# Patient Record
Sex: FEMALE | Race: WHITE | NOT HISPANIC OR LATINO | Employment: UNEMPLOYED | URBAN - METROPOLITAN AREA
[De-identification: names, ages, dates, MRNs, and addresses within clinical notes are randomized per-mention and may not be internally consistent; named-entity substitution may affect disease eponyms.]

---

## 2017-03-22 ENCOUNTER — TELEPHONE (OUTPATIENT)
Dept: OBSTETRICS AND GYNECOLOGY | Facility: CLINIC | Age: 27
End: 2017-03-22

## 2017-03-22 ENCOUNTER — LAB VISIT (OUTPATIENT)
Dept: LAB | Facility: HOSPITAL | Age: 27
End: 2017-03-22
Attending: OBSTETRICS & GYNECOLOGY
Payer: COMMERCIAL

## 2017-03-22 DIAGNOSIS — Z32.00 POSSIBLE PREGNANCY: ICD-10-CM

## 2017-03-22 DIAGNOSIS — Z32.00 POSSIBLE PREGNANCY: Primary | ICD-10-CM

## 2017-03-22 LAB — HCG INTACT+B SERPL-ACNC: <1.2 MIU/ML

## 2017-03-22 PROCEDURE — 84702 CHORIONIC GONADOTROPIN TEST: CPT

## 2017-03-22 NOTE — TELEPHONE ENCOUNTER
Sarah pt - pt said she is not feeling well so she wanted to see if she could discuss some things with the nurse.

## 2017-03-22 NOTE — TELEPHONE ENCOUNTER
Sarah pt stating that she is having surgery on her arm next week. Pt stating that she has a strong feeling that she is pregnant because she is feeling different and off. States that she took a upt that came back negative but she thinks it is still to early to be picked up on a home test. She would like to see if she can come in and get blood work done.    Dr. Smith do you want me to put in orders and schedule her for labs?

## 2017-03-22 NOTE — TELEPHONE ENCOUNTER
----- Message from Pili Smith MD sent at 3/22/2017  4:28 PM CDT -----  Call pt./ labs came back and she is not pregnant.

## 2017-03-22 NOTE — TELEPHONE ENCOUNTER
Dr Smith pt calling, came in today to do lab work and wants to know if they are back. Please call pt to let her know.Pt # [phone]

## 2017-04-04 RX ORDER — DROSPIRENONE AND ETHINYL ESTRADIOL 0.03MG-3MG
1 KIT ORAL DAILY
Qty: 84 TABLET | Refills: 0 | Status: SHIPPED | OUTPATIENT
Start: 2017-04-04 | End: 2017-06-26 | Stop reason: SDUPTHER

## 2017-04-04 NOTE — TELEPHONE ENCOUNTER
Pt said pharm told her they were waiting for the dr to authorize rx refill. Pt said she is out of her birth control and needs to start new pack today so she would like to know if she can get this called in asap.

## 2017-06-26 DIAGNOSIS — Z30.9 ENCOUNTER FOR CONTRACEPTIVE MANAGEMENT, UNSPECIFIED TYPE: Primary | ICD-10-CM

## 2017-06-26 RX ORDER — DROSPIRENONE AND ETHINYL ESTRADIOL 0.03MG-3MG
1 KIT ORAL DAILY
Qty: 84 TABLET | Refills: 0 | OUTPATIENT
Start: 2017-06-26

## 2017-06-26 RX ORDER — DROSPIRENONE AND ETHINYL ESTRADIOL 0.03MG-3MG
1 KIT ORAL DAILY
Qty: 84 TABLET | Refills: 0 | Status: SHIPPED | OUTPATIENT
Start: 2017-06-26 | End: 2017-09-15 | Stop reason: SDUPTHER

## 2017-08-22 ENCOUNTER — OFFICE VISIT (OUTPATIENT)
Dept: OBSTETRICS AND GYNECOLOGY | Facility: CLINIC | Age: 27
End: 2017-08-22
Payer: COMMERCIAL

## 2017-08-22 ENCOUNTER — TELEPHONE (OUTPATIENT)
Dept: OBSTETRICS AND GYNECOLOGY | Facility: CLINIC | Age: 27
End: 2017-08-22

## 2017-08-22 VITALS
BODY MASS INDEX: 20.74 KG/M2 | WEIGHT: 117.06 LBS | HEIGHT: 63 IN | SYSTOLIC BLOOD PRESSURE: 116 MMHG | DIASTOLIC BLOOD PRESSURE: 68 MMHG

## 2017-08-22 DIAGNOSIS — B37.31 VAGINAL YEAST INFECTION: Primary | ICD-10-CM

## 2017-08-22 DIAGNOSIS — N89.8 VAGINA ITCHING: ICD-10-CM

## 2017-08-22 PROCEDURE — 3008F BODY MASS INDEX DOCD: CPT | Mod: S$GLB,,, | Performed by: NURSE PRACTITIONER

## 2017-08-22 PROCEDURE — 87480 CANDIDA DNA DIR PROBE: CPT

## 2017-08-22 PROCEDURE — 99214 OFFICE O/P EST MOD 30 MIN: CPT | Mod: S$GLB,,, | Performed by: NURSE PRACTITIONER

## 2017-08-22 PROCEDURE — 87660 TRICHOMONAS VAGIN DIR PROBE: CPT

## 2017-08-22 PROCEDURE — 99999 PR PBB SHADOW E&M-EST. PATIENT-LVL III: CPT | Mod: PBBFAC,,, | Performed by: NURSE PRACTITIONER

## 2017-08-22 RX ORDER — FLUCONAZOLE 150 MG/1
TABLET ORAL
Qty: 2 TABLET | Refills: 0 | Status: SHIPPED | OUTPATIENT
Start: 2017-08-22 | End: 2017-12-27

## 2017-08-22 NOTE — PROGRESS NOTES
"Chief complaint: Vaginal Irritation/Itching    HPI: Vaginal Irritation/itching for the last 2-3 days. She started with itching and irritation and then used a monistat in the evening and had some relief but still with symptoms not getting better. The only new product is a bath bomb on Saturday. Otherwise she reports no changes in sexual partners, soaps, detergents, douching. No recent use of antibiotics. She has not used anything OTC and denies pelvic pain, fever, chills. She also reports this past cycle she had a one day spotting episode but this could be because she may have taken a pill late. No other reported symptoms.     ROS   Systemic: Not feeling tired (fatigue).  No fever chills   Gastrointestinal: No nausea, vomiting, no abdominal pain.  No diarrhea.  Genitourinary: No dysuria. No Pelvic Pain  Skin: No rash.    /68   Ht 5' 3" (1.6 m)   Wt 53.1 kg (117 lb 1 oz)   LMP 08/13/2017   BMI 20.74 kg/m²     Physical Exam   Vital Signs:° Normal.  General Appearance:° Well developed. ° Well nourished.  Lymph Nodes: no Inguinal LAD  Urinary System:° Normal. Urethra: ° Meatus showed no abnormalities. ° No mass on the urethra.  Genitalia: External: ° Vulva was normal. ° Genitalia exhibited no lesion.                    Vagina: ° Mucosa was normal. ° A vaginal discharge was observed moderate amt of thick clumpy yellow discharge (appears to be possible residual monistat/medications)  Pelvic: Cervix: ° No cervical discharge. ° Showed no lesion. ° Not tender, no CMT         Uterus: ° Position was normal. ° Not tender.  Neurological:° No disorientation was observed.  Psychiatric:Affect: ° Normal.  Skin:° No skin lesions.   Perineum:° Normal. ° Did not have a mass.° No perineal lesions/rashes/erythema     Assessment/Plan:  1. Yeast infection- affirm ordered since residual monistat in vault. Will treat empirically with Diflucan 150mg po today and repeat in 2 days, external Monistat cream as needed for symptom relief. " Prevention measures handout provided. And will notify of results and changes in medication if needed. Most likely related to bath-bomb     2. Spotting-- one time and possibly took pill late. Monitor and if continues notify office but did let pt know that irregular spotting can happen with missing pills or being very late taking pills.    RTC prn and as scheduled for annual exam.

## 2017-08-22 NOTE — TELEPHONE ENCOUNTER
Pt thinks she has a yeast infection.  C/o itching and swelling.  Has tried OTC medication without relief.  Scheduled with Ladan today.

## 2017-08-23 LAB
CANDIDA RRNA VAG QL PROBE: POSITIVE
G VAGINALIS RRNA GENITAL QL PROBE: NEGATIVE
T VAGINALIS RRNA GENITAL QL PROBE: NEGATIVE

## 2017-09-15 DIAGNOSIS — Z30.9 ENCOUNTER FOR CONTRACEPTIVE MANAGEMENT, UNSPECIFIED TYPE: ICD-10-CM

## 2017-09-15 RX ORDER — DROSPIRENONE AND ETHINYL ESTRADIOL 0.03MG-3MG
1 KIT ORAL DAILY
Qty: 84 TABLET | Refills: 0 | Status: SHIPPED | OUTPATIENT
Start: 2017-09-15 | End: 2017-11-09 | Stop reason: SDUPTHER

## 2017-11-09 ENCOUNTER — OFFICE VISIT (OUTPATIENT)
Dept: OBSTETRICS AND GYNECOLOGY | Facility: CLINIC | Age: 27
End: 2017-11-09
Attending: OBSTETRICS & GYNECOLOGY
Payer: COMMERCIAL

## 2017-11-09 VITALS
BODY MASS INDEX: 19.67 KG/M2 | HEIGHT: 63 IN | DIASTOLIC BLOOD PRESSURE: 64 MMHG | SYSTOLIC BLOOD PRESSURE: 102 MMHG | WEIGHT: 111 LBS

## 2017-11-09 DIAGNOSIS — Z30.9 ENCOUNTER FOR CONTRACEPTIVE MANAGEMENT, UNSPECIFIED TYPE: ICD-10-CM

## 2017-11-09 DIAGNOSIS — Z01.419 ENCOUNTER FOR GYNECOLOGICAL EXAMINATION (GENERAL) (ROUTINE) WITHOUT ABNORMAL FINDINGS: ICD-10-CM

## 2017-11-09 DIAGNOSIS — Z12.4 ENCOUNTER FOR PAPANICOLAOU SMEAR FOR CERVICAL CANCER SCREENING: Primary | ICD-10-CM

## 2017-11-09 PROCEDURE — 88175 CYTOPATH C/V AUTO FLUID REDO: CPT | Performed by: PATHOLOGY

## 2017-11-09 PROCEDURE — 99999 PR PBB SHADOW E&M-EST. PATIENT-LVL III: CPT | Mod: PBBFAC,,, | Performed by: OBSTETRICS & GYNECOLOGY

## 2017-11-09 PROCEDURE — 88141 CYTOPATH C/V INTERPRET: CPT | Mod: ,,, | Performed by: PATHOLOGY

## 2017-11-09 PROCEDURE — 99395 PREV VISIT EST AGE 18-39: CPT | Mod: S$GLB,,, | Performed by: OBSTETRICS & GYNECOLOGY

## 2017-11-09 PROCEDURE — 87624 HPV HI-RISK TYP POOLED RSLT: CPT

## 2017-11-09 RX ORDER — DROSPIRENONE AND ETHINYL ESTRADIOL 0.03MG-3MG
1 KIT ORAL DAILY
Qty: 84 TABLET | Refills: 3 | Status: SHIPPED | OUTPATIENT
Start: 2017-11-09 | End: 2018-11-03 | Stop reason: SDUPTHER

## 2017-11-09 NOTE — PROGRESS NOTES
Subjective:       Patient ID: Melinda Parekh is a 27 y.o. female.    Chief Complaint:  Well Woman (pt last pap 2016 normal)      There is no problem list on file for this patient.      History of Present Illness  27 y.o. yo  here for annual exam. No gyn complaints. Doing well.       Past Medical History:   Diagnosis Date    H/O elbow surgery     Oral contraceptive use        Past Surgical History:   Procedure Laterality Date    left elbow Left 2017    torn ligament       OB History    Para Term  AB Living   1 1 1     1   SAB TAB Ectopic Multiple Live Births           1      # Outcome Date GA Lbr Duncan/2nd Weight Sex Delivery Anes PTL Lv   1 Term 03/04/15   3.118 kg (6 lb 14 oz) F Vag-Spont EPI  EDMAR          Patient's last menstrual period was 10/09/2017 (approximate).   Date of Last Pap: 2016    Review of Systems  Review of Systems   Constitutional: Negative for fatigue and unexpected weight change.   Respiratory: Negative for shortness of breath.    Cardiovascular: Negative for chest pain.   Gastrointestinal: Negative for abdominal pain, constipation, diarrhea, nausea and vomiting.   Genitourinary: Negative for dysuria.   Musculoskeletal: Negative for back pain.   Skin: Negative for rash.   Neurological: Negative for headaches.   Hematological: Does not bruise/bleed easily.   Psychiatric/Behavioral: Negative for behavioral problems.        Objective:   Physical Exam:   Constitutional: She is oriented to person, place, and time. Vital signs are normal. She appears well-developed and well-nourished. No distress.        Pulmonary/Chest: She exhibits no mass. Right breast exhibits no mass, no nipple discharge, no skin change, no tenderness, no bleeding and no swelling. Left breast exhibits no mass, no nipple discharge, no skin change, no tenderness, no bleeding and no swelling. Breasts are symmetrical.        Abdominal: Soft. Normal appearance and bowel sounds are normal. She  exhibits no distension and no mass. There is no tenderness. There is no rebound.     Genitourinary: Vagina normal and uterus normal. There is no rash, tenderness, lesion or injury on the right labia. There is no rash, tenderness, lesion or injury on the left labia. Uterus is not deviated, not enlarged, not fixed, not tender, not hosting fibroids and not experiencing uterine prolapse. Cervix is normal. Right adnexum displays no mass, no tenderness and no fullness. Left adnexum displays no mass, no tenderness and no fullness. No erythema, tenderness, rectocele, cystocele or unspecified prolapse of vaginal walls in the vagina. No vaginal discharge found. Cervix exhibits no motion tenderness, no discharge and no friability.           Musculoskeletal: Normal range of motion and moves all extremeties.      Lymphadenopathy:     She has no axillary adenopathy.        Right: No supraclavicular adenopathy present.        Left: No supraclavicular adenopathy present.    Neurological: She is alert and oriented to person, place, and time.    Skin: Skin is warm and dry.    Psychiatric: She has a normal mood and affect. Her behavior is normal. Judgment normal.        Assessment/ Plan:     1. Encounter for Papanicolaou smear for cervical cancer screening  Liquid-based pap smear, screening   2. Encounter for contraceptive management, unspecified type  drospirenone-ethinyl estradiol (JESSICA) 3-0.03 mg per tablet   3. Encounter for gynecological examination (general) (routine) without abnormal findings         Follow-up with me in 1 year

## 2017-11-17 ENCOUNTER — NURSE TRIAGE (OUTPATIENT)
Dept: OBSTETRICS AND GYNECOLOGY | Facility: CLINIC | Age: 27
End: 2017-11-17

## 2017-11-17 DIAGNOSIS — Z11.51 ENCOUNTER FOR SCREENING FOR HUMAN PAPILLOMAVIRUS (HPV): Primary | ICD-10-CM

## 2017-11-17 NOTE — PROGRESS NOTES
Notes Recorded by Pili Smith MD on 11/16/2017 at 1:24 PM CST  Can you see if the lab can add HPV to this please?      Cytology lab still has specimen, order placed.

## 2017-11-28 ENCOUNTER — TELEPHONE (OUTPATIENT)
Dept: OBSTETRICS AND GYNECOLOGY | Facility: CLINIC | Age: 27
End: 2017-11-28

## 2017-11-28 ENCOUNTER — PATIENT MESSAGE (OUTPATIENT)
Dept: OBSTETRICS AND GYNECOLOGY | Facility: CLINIC | Age: 27
End: 2017-11-28

## 2017-11-28 NOTE — TELEPHONE ENCOUNTER
Dr. Smith-- pt would like to speak to Marcelo regarding her results and an appt. Pt's # [phone]

## 2017-11-28 NOTE — TELEPHONE ENCOUNTER
"Patient states Dr. Smith called her last night and told her that she needed a colpo done because of "some abnormal cells." The patient is panicking and wants this done asap.. Try to reassure patient, but she is still very nervous and does not want to wait to have this procedure done. Scheduled colpo for 12/6/17, but the patient wants a personal call from Dr. Smith herself because "I need to be seen sooner than this and I want Dr. Smith to squeeze me in."  "

## 2017-11-29 ENCOUNTER — LAB VISIT (OUTPATIENT)
Dept: LAB | Facility: HOSPITAL | Age: 27
End: 2017-11-29
Attending: OBSTETRICS & GYNECOLOGY
Payer: COMMERCIAL

## 2017-11-29 ENCOUNTER — PROCEDURE VISIT (OUTPATIENT)
Dept: OBSTETRICS AND GYNECOLOGY | Facility: CLINIC | Age: 27
End: 2017-11-29
Attending: OBSTETRICS & GYNECOLOGY
Payer: COMMERCIAL

## 2017-11-29 VITALS
SYSTOLIC BLOOD PRESSURE: 110 MMHG | BODY MASS INDEX: 20.16 KG/M2 | WEIGHT: 113.75 LBS | HEIGHT: 63 IN | DIASTOLIC BLOOD PRESSURE: 82 MMHG

## 2017-11-29 DIAGNOSIS — R87.612 LOW GRADE SQUAMOUS INTRAEPITH LESION ON CYTOLOGIC SMEAR CERVIX (LGSIL): ICD-10-CM

## 2017-11-29 DIAGNOSIS — Z11.3 SCREENING EXAMINATION FOR STD (SEXUALLY TRANSMITTED DISEASE): Primary | ICD-10-CM

## 2017-11-29 DIAGNOSIS — Z20.2 EXPOSURE TO STD: ICD-10-CM

## 2017-11-29 PROCEDURE — 86703 HIV-1/HIV-2 1 RESULT ANTBDY: CPT

## 2017-11-29 PROCEDURE — 57454 BX/CURETT OF CERVIX W/SCOPE: CPT | Mod: S$GLB,,, | Performed by: OBSTETRICS & GYNECOLOGY

## 2017-11-29 PROCEDURE — 87591 N.GONORRHOEAE DNA AMP PROB: CPT

## 2017-11-29 PROCEDURE — 86592 SYPHILIS TEST NON-TREP QUAL: CPT

## 2017-11-29 PROCEDURE — 88305 TISSUE EXAM BY PATHOLOGIST: CPT | Performed by: PATHOLOGY

## 2017-11-29 PROCEDURE — 87340 HEPATITIS B SURFACE AG IA: CPT

## 2017-11-29 PROCEDURE — 88305 TISSUE EXAM BY PATHOLOGIST: CPT | Mod: 26,,, | Performed by: PATHOLOGY

## 2017-11-30 LAB
C TRACH DNA SPEC QL NAA+PROBE: NOT DETECTED
HBV SURFACE AG SERPL QL IA: NEGATIVE
HIV 1+2 AB+HIV1 P24 AG SERPL QL IA: NEGATIVE
N GONORRHOEA DNA SPEC QL NAA+PROBE: NOT DETECTED

## 2017-12-01 LAB — RPR SER QL: NORMAL

## 2017-12-02 ENCOUNTER — PATIENT MESSAGE (OUTPATIENT)
Dept: OBSTETRICS AND GYNECOLOGY | Facility: CLINIC | Age: 27
End: 2017-12-02

## 2017-12-06 ENCOUNTER — PATIENT MESSAGE (OUTPATIENT)
Dept: OBSTETRICS AND GYNECOLOGY | Facility: CLINIC | Age: 27
End: 2017-12-06

## 2017-12-27 ENCOUNTER — OFFICE VISIT (OUTPATIENT)
Dept: PRIMARY CARE CLINIC | Facility: CLINIC | Age: 27
End: 2017-12-27
Payer: COMMERCIAL

## 2017-12-27 VITALS
BODY MASS INDEX: 20.38 KG/M2 | HEART RATE: 69 BPM | HEIGHT: 63 IN | OXYGEN SATURATION: 99 % | DIASTOLIC BLOOD PRESSURE: 86 MMHG | SYSTOLIC BLOOD PRESSURE: 129 MMHG | TEMPERATURE: 98 F | WEIGHT: 115 LBS | RESPIRATION RATE: 18 BRPM

## 2017-12-27 DIAGNOSIS — I88.9 SUBMANDIBULAR LYMPHADENITIS: Primary | ICD-10-CM

## 2017-12-27 PROCEDURE — 99213 OFFICE O/P EST LOW 20 MIN: CPT | Mod: S$GLB,,, | Performed by: INTERNAL MEDICINE

## 2017-12-27 PROCEDURE — 99999 PR PBB SHADOW E&M-EST. PATIENT-LVL III: CPT | Mod: PBBFAC,,, | Performed by: INTERNAL MEDICINE

## 2017-12-27 RX ORDER — AMOXICILLIN AND CLAVULANATE POTASSIUM 875; 125 MG/1; MG/1
1 TABLET, FILM COATED ORAL EVERY 12 HOURS
Qty: 20 TABLET | Refills: 0 | Status: SHIPPED | OUTPATIENT
Start: 2017-12-27 | End: 2018-01-06

## 2017-12-28 NOTE — PROGRESS NOTES
Subjective:       Patient ID: Melinda Parekh is a 27 y.o. female.    Chief Complaint: neck pain ( possible gland ) (left side, states tender and stiff when eating and when turning head x1 1/2 weeks)    HPI    Ptc/o swollen gland left neck was big but smaller now no sob cp no fever chill no sorethroat no n/v/d no HA  No night sweat wt loss chill   Review of Systems    Objective:      Physical Exam   Constitutional: She is oriented to person, place, and time. She appears well-developed and well-nourished. No distress.   HENT:   Head: Normocephalic and atraumatic.   Right Ear: External ear normal.   Left Ear: External ear normal.   Nose: Nose normal.   Mouth/Throat: Oropharynx is clear and moist. No oropharyngeal exudate.   Eyes: Conjunctivae and EOM are normal. Pupils are equal, round, and reactive to light. Right eye exhibits no discharge. Left eye exhibits no discharge.   Neck: Normal range of motion. Neck supple. No thyromegaly present.   Small nontender palpable submandibular gland   Cardiovascular: Normal rate, regular rhythm, normal heart sounds and intact distal pulses.  Exam reveals no gallop and no friction rub.    No murmur heard.  Pulmonary/Chest: Effort normal and breath sounds normal. No respiratory distress. She has no wheezes. She has no rales. She exhibits no tenderness.   Abdominal: Soft. Bowel sounds are normal. She exhibits no distension. There is no tenderness. There is no rebound and no guarding.   Musculoskeletal: Normal range of motion. She exhibits no edema, tenderness or deformity.   Lymphadenopathy:     She has no cervical adenopathy.   Neurological: She is alert and oriented to person, place, and time.   Skin: Skin is warm and dry. Capillary refill takes less than 2 seconds. No rash noted. No erythema.   Psychiatric: She has a normal mood and affect. Judgment and thought content normal.   Nursing note and vitals reviewed.      Assessment:       1. Submandibular lymphadenitis        Plan:        Submandibular lymphadenitis  Comments:  trial of 1 week po abx if not rsolved or getting bigger labs CT scan ENT consult  Orders:  -     amoxicillin-clavulanate 875-125mg (AUGMENTIN) 875-125 mg per tablet; Take 1 tablet by mouth every 12 (twelve) hours.  Dispense: 20 tablet; Refill: 0

## 2018-08-29 ENCOUNTER — OFFICE VISIT (OUTPATIENT)
Dept: OBSTETRICS AND GYNECOLOGY | Facility: CLINIC | Age: 28
End: 2018-08-29
Attending: OBSTETRICS & GYNECOLOGY
Payer: COMMERCIAL

## 2018-08-29 VITALS
HEIGHT: 63 IN | WEIGHT: 117.5 LBS | SYSTOLIC BLOOD PRESSURE: 110 MMHG | BODY MASS INDEX: 20.82 KG/M2 | DIASTOLIC BLOOD PRESSURE: 70 MMHG

## 2018-08-29 DIAGNOSIS — Z12.4 ENCOUNTER FOR PAPANICOLAOU SMEAR FOR CERVICAL CANCER SCREENING: Primary | ICD-10-CM

## 2018-08-29 DIAGNOSIS — N87.0 DYSPLASIA OF CERVIX, LOW GRADE (CIN 1): ICD-10-CM

## 2018-08-29 PROCEDURE — 99213 OFFICE O/P EST LOW 20 MIN: CPT | Mod: S$GLB,,, | Performed by: OBSTETRICS & GYNECOLOGY

## 2018-08-29 PROCEDURE — 99999 PR PBB SHADOW E&M-EST. PATIENT-LVL III: CPT | Mod: PBBFAC,,, | Performed by: OBSTETRICS & GYNECOLOGY

## 2018-08-29 PROCEDURE — 3008F BODY MASS INDEX DOCD: CPT | Mod: CPTII,S$GLB,, | Performed by: OBSTETRICS & GYNECOLOGY

## 2018-08-29 PROCEDURE — 88175 CYTOPATH C/V AUTO FLUID REDO: CPT

## 2018-08-29 NOTE — PROGRESS NOTES
Patient presents for follow up pap.  Last pap was approximately 6 months ago.  Cervical biopsies showed mild dysplasia.  Patient is without complaints today.    Pap #1 of 3  Considering another baby soon    ROS - pertinent ROS is negative    Exam:  Gen NAD  Vulva - no lesions  Cervix - no lesions  Uterus - small, nontender  Adnexa - no masses, nontender    Assessment:  Encounter Diagnoses   Name Primary?    Encounter for Papanicolaou smear for cervical cancer screening Yes    Dysplasia of cervix, low grade (MARYJO 1)          Plan  Pap in 6 months unless today's pap is abnormal

## 2018-11-03 DIAGNOSIS — Z30.9 ENCOUNTER FOR CONTRACEPTIVE MANAGEMENT, UNSPECIFIED TYPE: ICD-10-CM

## 2018-11-05 RX ORDER — DROSPIRENONE AND ETHINYL ESTRADIOL 0.03MG-3MG
KIT ORAL
Qty: 84 TABLET | Refills: 0 | Status: SHIPPED | OUTPATIENT
Start: 2018-11-05 | End: 2019-03-20

## 2019-01-18 ENCOUNTER — PATIENT MESSAGE (OUTPATIENT)
Dept: OBSTETRICS AND GYNECOLOGY | Facility: CLINIC | Age: 29
End: 2019-01-18

## 2019-03-20 ENCOUNTER — OFFICE VISIT (OUTPATIENT)
Dept: OBSTETRICS AND GYNECOLOGY | Facility: CLINIC | Age: 29
End: 2019-03-20
Attending: OBSTETRICS & GYNECOLOGY
Payer: COMMERCIAL

## 2019-03-20 VITALS
BODY MASS INDEX: 21.33 KG/M2 | DIASTOLIC BLOOD PRESSURE: 68 MMHG | HEIGHT: 63 IN | WEIGHT: 120.38 LBS | SYSTOLIC BLOOD PRESSURE: 112 MMHG

## 2019-03-20 DIAGNOSIS — Z01.419 ENCOUNTER FOR GYNECOLOGICAL EXAMINATION (GENERAL) (ROUTINE) WITHOUT ABNORMAL FINDINGS: ICD-10-CM

## 2019-03-20 DIAGNOSIS — Z12.4 ENCOUNTER FOR PAPANICOLAOU SMEAR FOR CERVICAL CANCER SCREENING: Primary | ICD-10-CM

## 2019-03-20 DIAGNOSIS — Z11.51 SCREENING FOR HPV (HUMAN PAPILLOMAVIRUS): ICD-10-CM

## 2019-03-20 PROCEDURE — 99395 PR PREVENTIVE VISIT,EST,18-39: ICD-10-PCS | Mod: S$GLB,,, | Performed by: OBSTETRICS & GYNECOLOGY

## 2019-03-20 PROCEDURE — 99395 PREV VISIT EST AGE 18-39: CPT | Mod: S$GLB,,, | Performed by: OBSTETRICS & GYNECOLOGY

## 2019-03-20 PROCEDURE — 88175 CYTOPATH C/V AUTO FLUID REDO: CPT

## 2019-03-20 PROCEDURE — 87624 HPV HI-RISK TYP POOLED RSLT: CPT

## 2019-03-20 PROCEDURE — 99999 PR PBB SHADOW E&M-EST. PATIENT-LVL III: CPT | Mod: PBBFAC,,, | Performed by: OBSTETRICS & GYNECOLOGY

## 2019-03-20 PROCEDURE — 99999 PR PBB SHADOW E&M-EST. PATIENT-LVL III: ICD-10-PCS | Mod: PBBFAC,,, | Performed by: OBSTETRICS & GYNECOLOGY

## 2019-03-20 NOTE — PROGRESS NOTES
Subjective:       Patient ID: Melinda Parekh is a 28 y.o. female.    Chief Complaint:  Well Woman (pap 2 of 3 )      There is no problem list on file for this patient.      History of Present Illness  28 y.o. yo  here for annual exam. No gyn complaints. Doing well. Had mild dysplasia in 2017 with HPV other positive. Off OCP for a couple of months. Ready to get pregnant. All questions answered. Cycles were a little early since getting off. Will monitor. Take PNV. Will repeat HPV today and see if it is now negative.       Past Medical History:   Diagnosis Date    H/O elbow surgery     Oral contraceptive use        Past Surgical History:   Procedure Laterality Date    left elbow Left 2017    torn ligament       OB History    Para Term  AB Living   1 1 1     1   SAB TAB Ectopic Multiple Live Births           1      # Outcome Date GA Lbr Duncan/2nd Weight Sex Delivery Anes PTL Lv   1 Term 03/04/15   3.118 kg (6 lb 14 oz) F Vag-Spont EPI  EDMAR          Patient's last menstrual period was 2019.   Date of Last Pap: 2018    Review of Systems  Review of Systems   Constitutional: Negative for fatigue and unexpected weight change.   Respiratory: Negative for shortness of breath.    Cardiovascular: Negative for chest pain.   Gastrointestinal: Negative for abdominal pain, constipation, diarrhea, nausea and vomiting.   Genitourinary: Negative for dysuria.   Musculoskeletal: Negative for back pain.   Skin: Negative for rash.   Neurological: Negative for headaches.   Hematological: Does not bruise/bleed easily.   Psychiatric/Behavioral: Negative for behavioral problems.        Objective:   Physical Exam:   Constitutional: She is oriented to person, place, and time. Vital signs are normal. She appears well-developed and well-nourished. No distress.        Pulmonary/Chest: She exhibits no mass. Right breast exhibits no mass, no nipple discharge, no skin change, no tenderness, no bleeding and no  swelling. Left breast exhibits no mass, no nipple discharge, no skin change, no tenderness, no bleeding and no swelling. Breasts are symmetrical.        Abdominal: Soft. Normal appearance and bowel sounds are normal. She exhibits no distension and no mass. There is no tenderness. There is no rebound.     Genitourinary: Vagina normal and uterus normal. There is no rash, tenderness, lesion or injury on the right labia. There is no rash, tenderness, lesion or injury on the left labia. Uterus is not deviated, not enlarged, not fixed, not tender, not hosting fibroids and not experiencing uterine prolapse. Cervix is normal. Right adnexum displays no mass, no tenderness and no fullness. Left adnexum displays no mass, no tenderness and no fullness. No erythema, tenderness, rectocele, cystocele or unspecified prolapse of vaginal walls in the vagina. No vaginal discharge found. Cervix exhibits no motion tenderness, no discharge and no friability.           Musculoskeletal: Normal range of motion and moves all extremeties.      Lymphadenopathy:     She has no axillary adenopathy.        Right: No supraclavicular adenopathy present.        Left: No supraclavicular adenopathy present.    Neurological: She is alert and oriented to person, place, and time.    Skin: Skin is warm and dry.    Psychiatric: She has a normal mood and affect. Her behavior is normal. Judgment normal.        Assessment/ Plan:     1. Encounter for Papanicolaou smear for cervical cancer screening  Liquid-based pap smear, screening   2. Screening for HPV (human papillomavirus)  HPV High Risk Genotypes, PCR   3. Encounter for gynecological examination (general) (routine) without abnormal findings         Follow-up with me in 1 year

## 2019-03-22 LAB
HPV HR 12 DNA CVX QL NAA+PROBE: NEGATIVE
HPV16 AG SPEC QL: NEGATIVE
HPV18 DNA SPEC QL NAA+PROBE: NEGATIVE

## 2019-05-15 ENCOUNTER — OFFICE VISIT (OUTPATIENT)
Dept: OBSTETRICS AND GYNECOLOGY | Facility: CLINIC | Age: 29
End: 2019-05-15
Payer: COMMERCIAL

## 2019-05-15 ENCOUNTER — LAB VISIT (OUTPATIENT)
Dept: LAB | Facility: HOSPITAL | Age: 29
End: 2019-05-15
Attending: OBSTETRICS & GYNECOLOGY
Payer: COMMERCIAL

## 2019-05-15 VITALS
SYSTOLIC BLOOD PRESSURE: 110 MMHG | HEIGHT: 63 IN | DIASTOLIC BLOOD PRESSURE: 68 MMHG | BODY MASS INDEX: 21.29 KG/M2 | WEIGHT: 120.13 LBS

## 2019-05-15 DIAGNOSIS — Z32.02 URINE PREGNANCY TEST NEGATIVE: ICD-10-CM

## 2019-05-15 DIAGNOSIS — N92.6 MISSED MENSES: Primary | ICD-10-CM

## 2019-05-15 DIAGNOSIS — N92.6 MISSED MENSES: ICD-10-CM

## 2019-05-15 LAB
ABO + RH BLD: NORMAL
B-HCG UR QL: NEGATIVE
CTP QC/QA: YES
HCG INTACT+B SERPL-ACNC: 146 MIU/ML
PROGEST SERPL-MCNC: 10.6 NG/ML

## 2019-05-15 PROCEDURE — 99214 PR OFFICE/OUTPT VISIT, EST, LEVL IV, 30-39 MIN: ICD-10-PCS | Mod: S$GLB,,, | Performed by: NURSE PRACTITIONER

## 2019-05-15 PROCEDURE — 99999 PR PBB SHADOW E&M-EST. PATIENT-LVL III: CPT | Mod: PBBFAC,,, | Performed by: NURSE PRACTITIONER

## 2019-05-15 PROCEDURE — 81025 URINE PREGNANCY TEST: CPT | Mod: S$GLB,,, | Performed by: NURSE PRACTITIONER

## 2019-05-15 PROCEDURE — 84144 ASSAY OF PROGESTERONE: CPT

## 2019-05-15 PROCEDURE — 99214 OFFICE O/P EST MOD 30 MIN: CPT | Mod: S$GLB,,, | Performed by: NURSE PRACTITIONER

## 2019-05-15 PROCEDURE — 86901 BLOOD TYPING SEROLOGIC RH(D): CPT

## 2019-05-15 PROCEDURE — 3008F PR BODY MASS INDEX (BMI) DOCUMENTED: ICD-10-PCS | Mod: CPTII,S$GLB,, | Performed by: NURSE PRACTITIONER

## 2019-05-15 PROCEDURE — 3008F BODY MASS INDEX DOCD: CPT | Mod: CPTII,S$GLB,, | Performed by: NURSE PRACTITIONER

## 2019-05-15 PROCEDURE — 81025 POCT URINE PREGNANCY: ICD-10-PCS | Mod: S$GLB,,, | Performed by: NURSE PRACTITIONER

## 2019-05-15 PROCEDURE — 99999 PR PBB SHADOW E&M-EST. PATIENT-LVL III: ICD-10-PCS | Mod: PBBFAC,,, | Performed by: NURSE PRACTITIONER

## 2019-05-15 PROCEDURE — 84702 CHORIONIC GONADOTROPIN TEST: CPT

## 2019-05-15 RX ORDER — AZITHROMYCIN 250 MG/1
TABLET, FILM COATED ORAL
Refills: 0 | COMMUNITY
Start: 2019-04-09 | End: 2019-05-15

## 2019-05-15 RX ORDER — PROGESTERONE 200 MG/1
200 CAPSULE ORAL NIGHTLY
Qty: 30 CAPSULE | Refills: 2 | Status: SHIPPED | OUTPATIENT
Start: 2019-05-15 | End: 2019-05-29

## 2019-05-15 RX ORDER — AMOXICILLIN AND CLAVULANATE POTASSIUM 875; 125 MG/1; MG/1
TABLET, FILM COATED ORAL
Refills: 0 | COMMUNITY
Start: 2019-04-03 | End: 2019-05-15

## 2019-05-15 NOTE — PROGRESS NOTES
"Chief Complaint: Absence of Menses     (Dr. Smith patient)    Patient's last menstrual period was 04/10/2019.,   EDC: 01/15/2020,   GA:  5w 0d,  based upon LMP.      Melinda Parekh is a 28 y.o. female  presents with complaint of absence of menstruation and (+) home UPT on 05/10/19 (x 2 tests).  States her menstrual cycles are every 28 days. She denies nausea; denies vomiting.  Denies vaginal bleeding or abdominal pain.    UPT is: negative.     Last Pap:  3/26/2019 Normal,  HPV negative    Past Medical History:   Diagnosis Date    H/O elbow surgery     Oral contraceptive use      Past Surgical History:   Procedure Laterality Date    left elbow Left 2017    torn ligament     Social History     Tobacco Use    Smoking status: Never Smoker    Smokeless tobacco: Never Used   Substance Use Topics    Alcohol use: Yes    Drug use: No     Family History   Problem Relation Age of Onset    No Known Problems Maternal Grandmother     No Known Problems Sister     Heart attack Maternal Grandfather     No Known Problems Paternal Grandmother     No Known Problems Paternal Grandfather     Skin cancer Other     Breast cancer Other     Colon cancer Neg Hx     Ovarian cancer Neg Hx      OB History    Para Term  AB Living   1 1 1     1   SAB TAB Ectopic Multiple Live Births           1      # Outcome Date GA Lbr Duncan/2nd Weight Sex Delivery Anes PTL Lv   1 Term 03/04/15   3.118 kg (6 lb 14 oz) F Vag-Spont EPI  EDMAR       /68   Ht 5' 3" (1.6 m)   Wt 54.5 kg (120 lb 2.4 oz)   LMP 04/10/2019   BMI 21.28 kg/m²   Body mass index is 21.28 kg/m².     ROS:  GENERAL: No weight changes. No swelling. No fatigue. No fever.  CARDIOVASCULAR: No chest pain. No shortness of breath. No leg cramps.   NEUROLOGICAL: No headaches. No vision changes.  BREASTS: No pain. No lumps. No discharge.  ABDOMEN: No pain. No diarrhea. No constipation.  REPRODUCTIVE: No abnormal bleeding.   VULVA: No pain. No lesions. No " itching.  VAGINA: No relaxation. No itching. No odor. No discharge. No lesions.  URINARY: No incontinence. No nocturia. No frequency. No dysuria.    MEDICATIONS AND ALLERGIES:  Reviewed     PE:   APPEARANCE: Well nourished, well developed, in no acute distress.  AFFECT: WNL, alert and oriented x 3.  NECK: Neck symmetric without masses or thyromegaly.   CHEST: Good respiratory effort.     Nausea and vomiting in pregnancy:    -  Education regarding lifestyle and dietary modifications    -  Advised use of B6/Unisom. Pt will notify us if no relief/worsening symptoms, will consider Zofran if needed.   (To help with nausea and vomiting, 25mg of Vitamin B6 taken 3-4 times a day along with 12.5 mg of Unisom taken 3-4 times a day helps. Unisom only comes in 25mg tabs, so you will have to cut              those in half. These are the same ingredients that are in the prescription versions!  Anything simone will help, along with small frequent meals instead of larger less frequent meals help. Stay hydrated.)     1st TRIMESTER COUNSELING: Discussed and packet provided:  * Common complaints of pregnancy  * HIV and other routine prenatal tests including  genetic screening  * Risk factors identified by prenatal history;  Nutrition and weight gain counseling  * Oriented to practice: discussed anticipated course of prenatal care  * Indications for Ultrasound  * Childbirth classes/Hospital facilities   * Toxoplasmosis precautions (Cats/Raw Meat);  Foods to avoid in pregnancy (i.e. sushi, fish high in mercury, deli meat, and unpasteurized cheeses)  * Sexual activity and exercise; Caffeine consumption (<300 mg/day)  * Environmental/Work hazards; Travel (including Zika Precautions)  * Tobacco, alcohol, and drug use  * Use of any medications (Including supplements, Vitamins, Herbs, or OTC Drugs)  * Domestic violence; Seat belt use & not texting while driving    *  Connected Moms-- to be discussed per MD at Initial OB  visit.      ASSESSMENT and PLAN:  Missed menses  -     hCG, quantitative; Future; Expected date: 05/15/2019  -     ABO/Rh; Future; Expected date: 05/15/2019  -     Progesterone; Future; Expected date: 05/15/2019  -     POCT urine pregnancy  -     hCG, quantitative; Future; Expected date: 05/17/2019  -     US OB/GYN Procedure ([facility]) - Extended List - Future; Future    Urine pregnancy test negative  -     hCG, quantitative; Future; Expected date: 05/17/2019    * Discussed with patient that urine pregnancy test in office today is negative. Today, she will have her hCG and progesterone levels drawn, as well as ABO Rh.  She will likely repeat HCG levels on Friday as well. Patient is aware this sometimes happens in early pregnancy and can be normal, and we also discussed the possibility of chemical pregnancy with good verbal understanding.  Once we have results back, patient will be notified of results.  Once HCG labs appear to be doubling appropriately as expected, we can then schedule Initial OB visit and u/s.    Follow up in about 2 days (around 5/17/2019) for Labs.    ~25 minutes spent with pt Face to Face with >50% of visit spent on education/counseling.

## 2019-05-16 ENCOUNTER — PATIENT MESSAGE (OUTPATIENT)
Dept: OBSTETRICS AND GYNECOLOGY | Facility: CLINIC | Age: 29
End: 2019-05-16

## 2019-05-16 ENCOUNTER — TELEPHONE (OUTPATIENT)
Dept: OBSTETRICS AND GYNECOLOGY | Facility: CLINIC | Age: 29
End: 2019-05-16

## 2019-05-16 NOTE — TELEPHONE ENCOUNTER
I called pt and told her we are working on her prior authorization for progesterone . Will call her back once I know its been approved. Pt will buy a few .

## 2019-05-16 NOTE — TELEPHONE ENCOUNTER
Dr. Smith pt called saying that she saw Suzanne yesterday and that she needs an authorization for progesterone.

## 2019-05-17 ENCOUNTER — LAB VISIT (OUTPATIENT)
Dept: LAB | Facility: OTHER | Age: 29
End: 2019-05-17
Payer: COMMERCIAL

## 2019-05-17 ENCOUNTER — TELEPHONE (OUTPATIENT)
Dept: OBSTETRICS AND GYNECOLOGY | Facility: CLINIC | Age: 29
End: 2019-05-17

## 2019-05-17 DIAGNOSIS — Z32.02 URINE PREGNANCY TEST NEGATIVE: ICD-10-CM

## 2019-05-17 DIAGNOSIS — O20.0 THREATENED ABORTION: Primary | ICD-10-CM

## 2019-05-17 DIAGNOSIS — N92.6 MISSED MENSES: ICD-10-CM

## 2019-05-17 LAB — HCG INTACT+B SERPL-ACNC: 212 MIU/ML

## 2019-05-17 PROCEDURE — 84702 CHORIONIC GONADOTROPIN TEST: CPT

## 2019-05-17 PROCEDURE — 36415 COLL VENOUS BLD VENIPUNCTURE: CPT

## 2019-05-17 NOTE — TELEPHONE ENCOUNTER
I called pt. HCG did not double. Suspicious for SAB or ectopic. rec repeat bhcg Monday. I entered labs. No need to call pt just put her on [facility] schedule for 8 am Monday please.

## 2019-05-20 ENCOUNTER — TELEPHONE (OUTPATIENT)
Dept: OBSTETRICS AND GYNECOLOGY | Facility: CLINIC | Age: 29
End: 2019-05-20

## 2019-05-20 ENCOUNTER — LAB VISIT (OUTPATIENT)
Dept: LAB | Facility: OTHER | Age: 29
End: 2019-05-20
Attending: OBSTETRICS & GYNECOLOGY
Payer: COMMERCIAL

## 2019-05-20 DIAGNOSIS — O20.0 THREATENED ABORTION: Primary | ICD-10-CM

## 2019-05-20 DIAGNOSIS — O20.0 THREATENED ABORTION: ICD-10-CM

## 2019-05-20 LAB — HCG INTACT+B SERPL-ACNC: 1054 MIU/ML

## 2019-05-20 PROCEDURE — 36415 COLL VENOUS BLD VENIPUNCTURE: CPT

## 2019-05-20 PROCEDURE — 84702 CHORIONIC GONADOTROPIN TEST: CPT

## 2019-05-20 NOTE — TELEPHONE ENCOUNTER
Scheduled   ----- Message from Pili Smith MD sent at 5/20/2019 12:46 PM CDT -----  I called pt. Needs another [facility] and u/s. Please order HCG, CBC, CMP for Wednesday at 9 am at [facility] and then u./s at 2 pm at [facility], dx- threatened ab, rule out ectopic then appt with me

## 2019-05-22 ENCOUNTER — LAB VISIT (OUTPATIENT)
Dept: LAB | Facility: OTHER | Age: 29
End: 2019-05-22
Attending: OBSTETRICS & GYNECOLOGY
Payer: COMMERCIAL

## 2019-05-22 ENCOUNTER — PROCEDURE VISIT (OUTPATIENT)
Dept: OBSTETRICS AND GYNECOLOGY | Facility: CLINIC | Age: 29
End: 2019-05-22
Attending: OBSTETRICS & GYNECOLOGY
Payer: COMMERCIAL

## 2019-05-22 DIAGNOSIS — O36.80X0 PREGNANCY WITH UNCERTAIN FETAL VIABILITY, SINGLE OR UNSPECIFIED FETUS: ICD-10-CM

## 2019-05-22 DIAGNOSIS — O20.0 THREATENED ABORTION: ICD-10-CM

## 2019-05-22 DIAGNOSIS — O20.0 THREATENED ABORTION: Primary | ICD-10-CM

## 2019-05-22 LAB
ALBUMIN SERPL BCP-MCNC: 4.2 G/DL (ref 3.5–5.2)
ALP SERPL-CCNC: 43 U/L (ref 55–135)
ALT SERPL W/O P-5'-P-CCNC: 16 U/L (ref 10–44)
ANION GAP SERPL CALC-SCNC: 5 MMOL/L (ref 8–16)
AST SERPL-CCNC: 16 U/L (ref 10–40)
BASOPHILS # BLD AUTO: 0.01 K/UL (ref 0–0.2)
BASOPHILS NFR BLD: 0.3 % (ref 0–1.9)
BILIRUB SERPL-MCNC: 0.8 MG/DL (ref 0.1–1)
BUN SERPL-MCNC: 14 MG/DL (ref 6–20)
CALCIUM SERPL-MCNC: 9.6 MG/DL (ref 8.7–10.5)
CHLORIDE SERPL-SCNC: 105 MMOL/L (ref 95–110)
CO2 SERPL-SCNC: 29 MMOL/L (ref 23–29)
CREAT SERPL-MCNC: 0.8 MG/DL (ref 0.5–1.4)
DIFFERENTIAL METHOD: ABNORMAL
EOSINOPHIL # BLD AUTO: 0 K/UL (ref 0–0.5)
EOSINOPHIL NFR BLD: 1 % (ref 0–8)
ERYTHROCYTE [DISTWIDTH] IN BLOOD BY AUTOMATED COUNT: 12.4 % (ref 11.5–14.5)
EST. GFR  (AFRICAN AMERICAN): >60 ML/MIN/1.73 M^2
EST. GFR  (NON AFRICAN AMERICAN): >60 ML/MIN/1.73 M^2
GLUCOSE SERPL-MCNC: 65 MG/DL (ref 70–110)
HCG INTACT+B SERPL-ACNC: 1829 MIU/ML
HCT VFR BLD AUTO: 38.5 % (ref 37–48.5)
HGB BLD-MCNC: 13.3 G/DL (ref 12–16)
LYMPHOCYTES # BLD AUTO: 1.2 K/UL (ref 1–4.8)
LYMPHOCYTES NFR BLD: 38.2 % (ref 18–48)
MCH RBC QN AUTO: 29.6 PG (ref 27–31)
MCHC RBC AUTO-ENTMCNC: 34.5 G/DL (ref 32–36)
MCV RBC AUTO: 86 FL (ref 82–98)
MONOCYTES # BLD AUTO: 0.3 K/UL (ref 0.3–1)
MONOCYTES NFR BLD: 9.7 % (ref 4–15)
NEUTROPHILS # BLD AUTO: 1.6 K/UL (ref 1.8–7.7)
NEUTROPHILS NFR BLD: 50.8 % (ref 38–73)
PLATELET # BLD AUTO: 240 K/UL (ref 150–350)
PMV BLD AUTO: 11.1 FL (ref 9.2–12.9)
POTASSIUM SERPL-SCNC: 3.8 MMOL/L (ref 3.5–5.1)
PROT SERPL-MCNC: 7.2 G/DL (ref 6–8.4)
RBC # BLD AUTO: 4.49 M/UL (ref 4–5.4)
SODIUM SERPL-SCNC: 139 MMOL/L (ref 136–145)
WBC # BLD AUTO: 3.09 K/UL (ref 3.9–12.7)

## 2019-05-22 PROCEDURE — 36415 COLL VENOUS BLD VENIPUNCTURE: CPT

## 2019-05-22 PROCEDURE — 99213 PR OFFICE/OUTPT VISIT, EST, LEVL III, 20-29 MIN: ICD-10-PCS | Mod: S$GLB,,, | Performed by: OBSTETRICS & GYNECOLOGY

## 2019-05-22 PROCEDURE — 99999 PR PBB SHADOW E&M-EST. PATIENT-LVL II: ICD-10-PCS | Mod: PBBFAC,,, | Performed by: OBSTETRICS & GYNECOLOGY

## 2019-05-22 PROCEDURE — 80053 COMPREHEN METABOLIC PANEL: CPT

## 2019-05-22 PROCEDURE — 76817 PR US, OB, TRANSVAG APPROACH: ICD-10-PCS | Mod: S$GLB,,, | Performed by: OBSTETRICS & GYNECOLOGY

## 2019-05-22 PROCEDURE — 84702 CHORIONIC GONADOTROPIN TEST: CPT

## 2019-05-22 PROCEDURE — 99999 PR PBB SHADOW E&M-EST. PATIENT-LVL II: CPT | Mod: PBBFAC,,, | Performed by: OBSTETRICS & GYNECOLOGY

## 2019-05-22 PROCEDURE — 76817 TRANSVAGINAL US OBSTETRIC: CPT | Mod: S$GLB,,, | Performed by: OBSTETRICS & GYNECOLOGY

## 2019-05-22 PROCEDURE — 85025 COMPLETE CBC W/AUTO DIFF WBC: CPT

## 2019-05-22 PROCEDURE — 99213 OFFICE O/P EST LOW 20 MIN: CPT | Mod: S$GLB,,, | Performed by: OBSTETRICS & GYNECOLOGY

## 2019-05-22 NOTE — PROGRESS NOTES
Subjective:       Patient ID: Melinda Parekh is a 28 y.o. female.    Chief Complaint:  No chief complaint on file.      There is no problem list on file for this patient.      History of Present Illness  28 y.o. yo  here for OB u/s. Initially BHCG were not increasing appropriately. Then the most recent have increased. On Prometrum. No bleeding or pain. I had a suspicion initially that it may be ectopic and therefore had her come in for u/s today.     U/S today shows small sac in the uterus c/w 5 and a half weeks. No fetal pole identified.     This reassures me that it is not an ectopic. Discussed in detail with pt and . rec repeat labs Friday and come for u/s next week. All questions answered. SAB precautions explained.     Past Medical History:   Diagnosis Date    H/O elbow surgery     Oral contraceptive use        Past Surgical History:   Procedure Laterality Date    left elbow Left 2017    torn ligament       OB History    Para Term  AB Living   1 1 1     1   SAB TAB Ectopic Multiple Live Births           1      # Outcome Date GA Lbr Duncan/2nd Weight Sex Delivery Anes PTL Lv   1 Term 03/04/15   3.118 kg (6 lb 14 oz) F Vag-Spont EPI  EDMAR       No LMP recorded.   Date of Last Pap: 3/26/2019    Review of Systems  Review of Systems   Constitutional: Negative for fatigue and unexpected weight change.   Respiratory: Negative for shortness of breath.    Cardiovascular: Negative for chest pain.   Gastrointestinal: Negative for abdominal pain, constipation, diarrhea, nausea and vomiting.   Genitourinary: Negative for dysuria.   Musculoskeletal: Negative for back pain.   Skin: Negative for rash.   Neurological: Negative for headaches.   Hematological: Does not bruise/bleed easily.   Psychiatric/Behavioral: Negative for behavioral problems.        Objective:   Physical Exam:   Constitutional: She appears well-developed and well-nourished.                                  Assessment/ Plan:      1. Threatened   hCG, quantitative    US OB/GYN Procedure ([facility]) - Extended List - Future       Follow-up with me in 1 week for repeat scan

## 2019-05-22 NOTE — PROCEDURES
Procedures   Obstetrical ultrasound completed today.  See report in imaging section of [facility].

## 2019-05-24 ENCOUNTER — LAB VISIT (OUTPATIENT)
Dept: LAB | Facility: OTHER | Age: 29
End: 2019-05-24
Attending: OBSTETRICS & GYNECOLOGY
Payer: COMMERCIAL

## 2019-05-24 DIAGNOSIS — O20.0 THREATENED ABORTION: ICD-10-CM

## 2019-05-24 LAB — HCG INTACT+B SERPL-ACNC: 2937 MIU/ML

## 2019-05-24 PROCEDURE — 36415 COLL VENOUS BLD VENIPUNCTURE: CPT

## 2019-05-24 PROCEDURE — 84702 CHORIONIC GONADOTROPIN TEST: CPT

## 2019-05-29 ENCOUNTER — ROUTINE PRENATAL (OUTPATIENT)
Dept: OBSTETRICS AND GYNECOLOGY | Facility: CLINIC | Age: 29
End: 2019-05-29
Attending: OBSTETRICS & GYNECOLOGY
Payer: COMMERCIAL

## 2019-05-29 VITALS
DIASTOLIC BLOOD PRESSURE: 62 MMHG | WEIGHT: 119.19 LBS | BODY MASS INDEX: 21.11 KG/M2 | SYSTOLIC BLOOD PRESSURE: 102 MMHG

## 2019-05-29 DIAGNOSIS — O20.0 THREATENED ABORTION: ICD-10-CM

## 2019-05-29 DIAGNOSIS — Z36.89 ENCOUNTER FOR FETAL ANATOMIC SURVEY: ICD-10-CM

## 2019-05-29 DIAGNOSIS — Z34.90 PREGNANCY, UNSPECIFIED GESTATIONAL AGE: Primary | ICD-10-CM

## 2019-05-29 PROCEDURE — 99999 PR PBB SHADOW E&M-EST. PATIENT-LVL III: CPT | Mod: PBBFAC,,, | Performed by: OBSTETRICS & GYNECOLOGY

## 2019-05-29 PROCEDURE — 76801 PR US, OB <14WKS, TRANSABD, SINGLE GESTATION: ICD-10-PCS | Mod: S$GLB,,, | Performed by: OBSTETRICS & GYNECOLOGY

## 2019-05-29 PROCEDURE — 76801 OB US < 14 WKS SINGLE FETUS: CPT | Mod: S$GLB,,, | Performed by: OBSTETRICS & GYNECOLOGY

## 2019-05-29 PROCEDURE — 0502F SUBSEQUENT PRENATAL CARE: CPT | Mod: CPTII,S$GLB,, | Performed by: OBSTETRICS & GYNECOLOGY

## 2019-05-29 PROCEDURE — 0502F PR SUBSEQUENT PRENATAL CARE: ICD-10-PCS | Mod: CPTII,S$GLB,, | Performed by: OBSTETRICS & GYNECOLOGY

## 2019-05-29 PROCEDURE — 99999 PR PBB SHADOW E&M-EST. PATIENT-LVL III: ICD-10-PCS | Mod: PBBFAC,,, | Performed by: OBSTETRICS & GYNECOLOGY

## 2019-05-29 NOTE — PROGRESS NOTES
U/S- SLIUP  Discussed still with SAB precautions but reassuring that now with +FHT. Measuring about 6 weeks and 2 days. All questions answered. Patient decided not to take progesterone after doing her own research. Will repeat u/s in 2-3 weeks

## 2019-06-13 ENCOUNTER — ROUTINE PRENATAL (OUTPATIENT)
Dept: OBSTETRICS AND GYNECOLOGY | Facility: CLINIC | Age: 29
End: 2019-06-13
Attending: OBSTETRICS & GYNECOLOGY
Payer: COMMERCIAL

## 2019-06-13 VITALS — SYSTOLIC BLOOD PRESSURE: 110 MMHG | DIASTOLIC BLOOD PRESSURE: 70 MMHG

## 2019-06-13 DIAGNOSIS — Z36.89 ENCOUNTER FOR FETAL ANATOMIC SURVEY: ICD-10-CM

## 2019-06-13 DIAGNOSIS — Z34.90 PREGNANCY, UNSPECIFIED GESTATIONAL AGE: ICD-10-CM

## 2019-06-13 DIAGNOSIS — Z34.91 INITIAL OBSTETRIC VISIT IN FIRST TRIMESTER: Primary | ICD-10-CM

## 2019-06-13 LAB
C TRACH DNA SPEC QL NAA+PROBE: NOT DETECTED
N GONORRHOEA DNA SPEC QL NAA+PROBE: NOT DETECTED

## 2019-06-13 PROCEDURE — 76801 OB US < 14 WKS SINGLE FETUS: CPT | Mod: S$GLB,,, | Performed by: PEDIATRICS

## 2019-06-13 PROCEDURE — 76801 PR US, OB <14WKS, TRANSABD, SINGLE GESTATION: ICD-10-PCS | Mod: S$GLB,,, | Performed by: PEDIATRICS

## 2019-06-13 PROCEDURE — 99999 PR PBB SHADOW E&M-EST. PATIENT-LVL II: ICD-10-PCS | Mod: PBBFAC,,, | Performed by: OBSTETRICS & GYNECOLOGY

## 2019-06-13 PROCEDURE — 99999 PR PBB SHADOW E&M-EST. PATIENT-LVL II: CPT | Mod: PBBFAC,,, | Performed by: OBSTETRICS & GYNECOLOGY

## 2019-06-13 PROCEDURE — 87086 URINE CULTURE/COLONY COUNT: CPT

## 2019-06-13 PROCEDURE — 0502F SUBSEQUENT PRENATAL CARE: CPT | Mod: CPTII,S$GLB,, | Performed by: OBSTETRICS & GYNECOLOGY

## 2019-06-13 PROCEDURE — 0502F PR SUBSEQUENT PRENATAL CARE: ICD-10-PCS | Mod: CPTII,S$GLB,, | Performed by: OBSTETRICS & GYNECOLOGY

## 2019-06-13 PROCEDURE — 87491 CHLMYD TRACH DNA AMP PROBE: CPT

## 2019-06-13 NOTE — PROCEDURES
Procedures       Indication  ========    viability    History  ======    Previous Outcomes   2  Para 1    Fetal Lab Tests  ============    Test: hCG, quantitative  Sample taken: 2019  Result:    1054    Test: hCG, quantitative  Sample taken: 2019  Result:    1829    Test: hCG, quantitative  Sample taken: 2019  Result:    2937    Maternal Assessment  =================    Weight 55 kg  Weight (lb) 121 lb    Method  ======    Transabdominal ultrasound examination, Voluson S8. View: Good view    Pregnancy  =========    Number of fetuses: none    Dating  ======    LMP on: 4/10/2019  GA by LMP 9 w + 1 d  SUPA by LMP: 1/15/2020  GA by prior assessment 8 w + 3 d  SUPA by prior assessment: 2020  Ultrasound examination on: 2019  GA by U/S based upon: CRL  GA by U/S 8 w + 5 d  SUPA by U/S: 2020  Assigned: based on stated SUPA, selected on 2019  Assigned GA 8 w + 3 d  Assigned SUPA: 2020  Pregnancy length 280 d    Assessment  ==========    Gestational sac: visualized  Location: intrauterine  Yolk sac: visualized  Amniotic sac: visualized  Embryo: visualized  CRL 21.3 mm  8w 5d        Hadlock    Cardiac activity: present   bpm          Impression  =========    Single viable intrauterine pregnancy consistent with current dating.  Embryo grossly WNL with normal cardiac activity.      Recommendation  ==============    Repeat ultrasound study as clinically indicated

## 2019-06-13 NOTE — PROGRESS NOTES
U/S- 8 weeks and 5 days. Appropriate growth. Sac is normal appearing. rec [id number] and appt in 2 weeks. All questions answered

## 2019-06-14 LAB — BACTERIA UR CULT: NO GROWTH

## 2019-06-27 ENCOUNTER — ROUTINE PRENATAL (OUTPATIENT)
Dept: OBSTETRICS AND GYNECOLOGY | Facility: CLINIC | Age: 29
End: 2019-06-27
Attending: OBSTETRICS & GYNECOLOGY
Payer: COMMERCIAL

## 2019-06-27 ENCOUNTER — LAB VISIT (OUTPATIENT)
Dept: LAB | Facility: OTHER | Age: 29
End: 2019-06-27
Attending: OBSTETRICS & GYNECOLOGY
Payer: COMMERCIAL

## 2019-06-27 VITALS
DIASTOLIC BLOOD PRESSURE: 62 MMHG | SYSTOLIC BLOOD PRESSURE: 104 MMHG | WEIGHT: 121.06 LBS | BODY MASS INDEX: 21.44 KG/M2

## 2019-06-27 DIAGNOSIS — Z34.91 INITIAL OBSTETRIC VISIT IN FIRST TRIMESTER: ICD-10-CM

## 2019-06-27 DIAGNOSIS — Z34.80 SUPERVISION OF OTHER NORMAL PREGNANCY, ANTEPARTUM: Primary | ICD-10-CM

## 2019-06-27 LAB
ABO + RH BLD: NORMAL
ALBUMIN SERPL BCP-MCNC: 3.9 G/DL (ref 3.5–5.2)
ALP SERPL-CCNC: 36 U/L (ref 55–135)
ALT SERPL W/O P-5'-P-CCNC: 11 U/L (ref 10–44)
ANION GAP SERPL CALC-SCNC: 9 MMOL/L (ref 8–16)
AST SERPL-CCNC: 14 U/L (ref 10–40)
BASOPHILS # BLD AUTO: 0 K/UL (ref 0–0.2)
BASOPHILS NFR BLD: 0 % (ref 0–1.9)
BILIRUB SERPL-MCNC: 0.5 MG/DL (ref 0.1–1)
BLD GP AB SCN CELLS X3 SERPL QL: NORMAL
BUN SERPL-MCNC: 10 MG/DL (ref 6–20)
CALCIUM SERPL-MCNC: 9.3 MG/DL (ref 8.7–10.5)
CHLORIDE SERPL-SCNC: 103 MMOL/L (ref 95–110)
CO2 SERPL-SCNC: 26 MMOL/L (ref 23–29)
CREAT SERPL-MCNC: 0.6 MG/DL (ref 0.5–1.4)
DIFFERENTIAL METHOD: ABNORMAL
EOSINOPHIL # BLD AUTO: 0 K/UL (ref 0–0.5)
EOSINOPHIL NFR BLD: 0.4 % (ref 0–8)
ERYTHROCYTE [DISTWIDTH] IN BLOOD BY AUTOMATED COUNT: 12.7 % (ref 11.5–14.5)
EST. GFR  (AFRICAN AMERICAN): >60 ML/MIN/1.73 M^2
EST. GFR  (NON AFRICAN AMERICAN): >60 ML/MIN/1.73 M^2
GLUCOSE SERPL-MCNC: 69 MG/DL (ref 70–110)
HCT VFR BLD AUTO: 35.4 % (ref 37–48.5)
HGB BLD-MCNC: 12.2 G/DL (ref 12–16)
LYMPHOCYTES # BLD AUTO: 1.2 K/UL (ref 1–4.8)
LYMPHOCYTES NFR BLD: 21.6 % (ref 18–48)
MCH RBC QN AUTO: 29.4 PG (ref 27–31)
MCHC RBC AUTO-ENTMCNC: 34.5 G/DL (ref 32–36)
MCV RBC AUTO: 85 FL (ref 82–98)
MONOCYTES # BLD AUTO: 0.4 K/UL (ref 0.3–1)
MONOCYTES NFR BLD: 7 % (ref 4–15)
NEUTROPHILS # BLD AUTO: 3.9 K/UL (ref 1.8–7.7)
NEUTROPHILS NFR BLD: 71 % (ref 38–73)
PLATELET # BLD AUTO: 227 K/UL (ref 150–350)
PMV BLD AUTO: 11.1 FL (ref 9.2–12.9)
POTASSIUM SERPL-SCNC: 3.6 MMOL/L (ref 3.5–5.1)
PROT SERPL-MCNC: 7.1 G/DL (ref 6–8.4)
RBC # BLD AUTO: 4.15 M/UL (ref 4–5.4)
SODIUM SERPL-SCNC: 138 MMOL/L (ref 136–145)
TSH SERPL DL<=0.005 MIU/L-ACNC: 0.71 UIU/ML (ref 0.4–4)
WBC # BLD AUTO: 5.56 K/UL (ref 3.9–12.7)

## 2019-06-27 PROCEDURE — 99999 PR PBB SHADOW E&M-EST. PATIENT-LVL II: CPT | Mod: PBBFAC,,, | Performed by: OBSTETRICS & GYNECOLOGY

## 2019-06-27 PROCEDURE — 86592 SYPHILIS TEST NON-TREP QUAL: CPT

## 2019-06-27 PROCEDURE — 0502F SUBSEQUENT PRENATAL CARE: CPT | Mod: CPTII,S$GLB,, | Performed by: OBSTETRICS & GYNECOLOGY

## 2019-06-27 PROCEDURE — 99999 PR PBB SHADOW E&M-EST. PATIENT-LVL II: ICD-10-PCS | Mod: PBBFAC,,, | Performed by: OBSTETRICS & GYNECOLOGY

## 2019-06-27 PROCEDURE — 86762 RUBELLA ANTIBODY: CPT

## 2019-06-27 PROCEDURE — 87340 HEPATITIS B SURFACE AG IA: CPT

## 2019-06-27 PROCEDURE — 0502F PR SUBSEQUENT PRENATAL CARE: ICD-10-PCS | Mod: CPTII,S$GLB,, | Performed by: OBSTETRICS & GYNECOLOGY

## 2019-06-27 PROCEDURE — 84443 ASSAY THYROID STIM HORMONE: CPT

## 2019-06-27 PROCEDURE — 85025 COMPLETE CBC W/AUTO DIFF WBC: CPT

## 2019-06-27 PROCEDURE — 80053 COMPREHEN METABOLIC PANEL: CPT

## 2019-06-27 PROCEDURE — 86901 BLOOD TYPING SEROLOGIC RH(D): CPT

## 2019-06-27 PROCEDURE — 86703 HIV-1/HIV-2 1 RESULT ANTBDY: CPT

## 2019-06-27 NOTE — PROGRESS NOTES
+FHT and movement on u/s. [id number] today and OB panel. RTC in 2 weeks for reassurance. If normal then Q 4 weeks. Pt agrees

## 2019-06-28 LAB
HBV SURFACE AG SERPL QL IA: NEGATIVE
HIV 1+2 AB+HIV1 P24 AG SERPL QL IA: NEGATIVE
RPR SER QL: NORMAL
RUBV IGG SER-ACNC: 23.7 IU/ML
RUBV IGG SER-IMP: REACTIVE

## 2019-07-12 ENCOUNTER — ROUTINE PRENATAL (OUTPATIENT)
Dept: OBSTETRICS AND GYNECOLOGY | Facility: CLINIC | Age: 29
End: 2019-07-12
Payer: COMMERCIAL

## 2019-07-12 VITALS
SYSTOLIC BLOOD PRESSURE: 100 MMHG | DIASTOLIC BLOOD PRESSURE: 70 MMHG | WEIGHT: 120.94 LBS | BODY MASS INDEX: 21.42 KG/M2

## 2019-07-12 DIAGNOSIS — Z34.80 SUPERVISION OF OTHER NORMAL PREGNANCY, ANTEPARTUM: Primary | ICD-10-CM

## 2019-07-12 PROCEDURE — 99999 PR PBB SHADOW E&M-EST. PATIENT-LVL II: CPT | Mod: PBBFAC,,, | Performed by: OBSTETRICS & GYNECOLOGY

## 2019-07-12 PROCEDURE — 99999 PR PBB SHADOW E&M-EST. PATIENT-LVL II: ICD-10-PCS | Mod: PBBFAC,,, | Performed by: OBSTETRICS & GYNECOLOGY

## 2019-07-12 PROCEDURE — 0502F PR SUBSEQUENT PRENATAL CARE: ICD-10-PCS | Mod: CPTII,S$GLB,, | Performed by: OBSTETRICS & GYNECOLOGY

## 2019-07-12 PROCEDURE — 0502F SUBSEQUENT PRENATAL CARE: CPT | Mod: CPTII,S$GLB,, | Performed by: OBSTETRICS & GYNECOLOGY

## 2019-07-13 ENCOUNTER — PATIENT MESSAGE (OUTPATIENT)
Dept: OBSTETRICS AND GYNECOLOGY | Facility: CLINIC | Age: 29
End: 2019-07-13

## 2019-08-08 ENCOUNTER — ROUTINE PRENATAL (OUTPATIENT)
Dept: OBSTETRICS AND GYNECOLOGY | Facility: CLINIC | Age: 29
End: 2019-08-08
Attending: OBSTETRICS & GYNECOLOGY
Payer: COMMERCIAL

## 2019-08-08 ENCOUNTER — PATIENT MESSAGE (OUTPATIENT)
Dept: ADMINISTRATIVE | Facility: OTHER | Age: 29
End: 2019-08-08

## 2019-08-08 VITALS
BODY MASS INDEX: 21.87 KG/M2 | SYSTOLIC BLOOD PRESSURE: 108 MMHG | DIASTOLIC BLOOD PRESSURE: 70 MMHG | WEIGHT: 123.44 LBS

## 2019-08-08 DIAGNOSIS — Z34.80 SUPERVISION OF OTHER NORMAL PREGNANCY, ANTEPARTUM: Primary | ICD-10-CM

## 2019-08-08 PROCEDURE — 0502F PR SUBSEQUENT PRENATAL CARE: ICD-10-PCS | Mod: CPTII,S$GLB,, | Performed by: OBSTETRICS & GYNECOLOGY

## 2019-08-08 PROCEDURE — 99999 PR PBB SHADOW E&M-EST. PATIENT-LVL III: CPT | Mod: PBBFAC,,, | Performed by: OBSTETRICS & GYNECOLOGY

## 2019-08-08 PROCEDURE — 0502F SUBSEQUENT PRENATAL CARE: CPT | Mod: CPTII,S$GLB,, | Performed by: OBSTETRICS & GYNECOLOGY

## 2019-08-08 PROCEDURE — 99999 PR PBB SHADOW E&M-EST. PATIENT-LVL III: ICD-10-PCS | Mod: PBBFAC,,, | Performed by: OBSTETRICS & GYNECOLOGY

## 2019-08-26 ENCOUNTER — INITIAL CONSULT (OUTPATIENT)
Dept: MATERNAL FETAL MEDICINE | Facility: CLINIC | Age: 29
End: 2019-08-26
Payer: COMMERCIAL

## 2019-08-26 ENCOUNTER — PROCEDURE VISIT (OUTPATIENT)
Dept: MATERNAL FETAL MEDICINE | Facility: CLINIC | Age: 29
End: 2019-08-26
Attending: OBSTETRICS & GYNECOLOGY
Payer: COMMERCIAL

## 2019-08-26 DIAGNOSIS — Z36.89 ENCOUNTER FOR FETAL ANATOMIC SURVEY: ICD-10-CM

## 2019-08-26 DIAGNOSIS — O35.EXX0 PYELECTASIS OF FETUS ON PRENATAL ULTRASOUND: ICD-10-CM

## 2019-08-26 DIAGNOSIS — Z36.89 ENCOUNTER FOR ULTRASOUND TO CHECK FETAL GROWTH: Primary | ICD-10-CM

## 2019-08-26 PROCEDURE — 99212 PR OFFICE/OUTPT VISIT, EST, LEVL II, 10-19 MIN: ICD-10-PCS | Mod: 25,S$GLB,, | Performed by: OBSTETRICS & GYNECOLOGY

## 2019-08-26 PROCEDURE — 76811 PR US, OB FETAL EVAL & EXAM, TRANSABDOM,FIRST GESTATION: ICD-10-PCS | Mod: S$GLB,,, | Performed by: OBSTETRICS & GYNECOLOGY

## 2019-08-26 PROCEDURE — 76811 OB US DETAILED SNGL FETUS: CPT | Mod: S$GLB,,, | Performed by: OBSTETRICS & GYNECOLOGY

## 2019-08-26 PROCEDURE — 99212 OFFICE O/P EST SF 10 MIN: CPT | Mod: 25,S$GLB,, | Performed by: OBSTETRICS & GYNECOLOGY

## 2019-08-26 NOTE — LETTER
August 26, 2019      Pili Smith MD  [address]  [address]  [place] [address]           [facility] [address]  [address]  [place] [address]  Phone: [phone]          Patient: Melinda Parekh   MR Number: [id number]   YOB: 1990   Date of Visit: 8/26/2019       Dear Dr. Pili Smith:    Thank you for referring Melinda Parekh to me for evaluation. Attached you will find relevant portions of my assessment and plan of care.    If you have questions, please do not hesitate to call me. I look forward to following Melinda Parekh along with you.    Sincerely,    Cyndie Palacio MD    Enclosure  CC:  No Recipients    If you would like to receive this communication electronically, please contact [email or web address] or [phone] to request more information on [employer] access.    For providers and/or their staff who would like to refer a patient to [facility], please contact us through our one-stop-shop provider referral line, [facility], at [phone].    If you feel you have received this communication in error or would no longer like to receive these types of communications, please e-mail [email or web address]

## 2019-08-26 NOTE — PROGRESS NOTES
OB Ultrasound Report and consult note (see PDF version under imaging tab)    Indication  ========    Fetal anatomy survey    History  ======    Previous Outcomes   2  Para 1    Pregnancy History  ===============    Maternal Lab Tests  Test: Cell free fetal DNA analysis  Result:    [id number] NEGATIVE    Fetal Lab Tests  ============    Test: hCG, quantitative  Sample taken: 2019  Result:    1054    Test: hCG, quantitative  Sample taken: 2019  Result:    1829    Test: hCG, quantitative  Sample taken: 2019  Result:    2937    Method  ======    Transabdominal ultrasound examination. 2D Color Doppler, Voluson E10. View: Suboptimal view: limited by fetal position    Pregnancy  =========    Hester pregnancy. Number of fetuses: 1    Dating  ======    LMP on: 4/10/2019  Cycle: regular cycle  GA by LMP 19 w + 5 d  SUPA by LMP: 1/15/2020  GA by prior assessment 19 w + 0 d  SUPA by prior assessment: 2020  Ultrasound examination on: 2019  GA by U/S based upon: AC, BPD, Femur, HC  GA by U/S 19 w + 2 d  SUPA by U/S: 2020  Assigned: based on stated SPUA, selected on 2019  Assigned GA 19 w + 0 d  Assigned SUPA: 2020  Pregnancy length 280 d    General Evaluation  ===============    Cardiac activity present.  bpm.  Fetal movements visualized.  Presentation breech.  Placenta Placental site: anterior. Placental edge-to-cervical os distance 51 mm.  Umbilical cord Cord vessels: 3 vessel cord. Insertion site: suboptimal.  Amniotic fluid Amount of AF: normal amount.    Fetal Biometry  ===========    Standard  BPD 43.0 mm  19w 0d                Hadlock    OFD 56.6 mm  19w 6d                Maya    .3 mm  18w 5d                Hadlock    Cerebellum tr 18.6 mm  18w 6d                Andrade    Nuchal fold 3.2 mm  .6 mm  19w 6d                Hadlock    Femur 30.5 mm  19w 3d                Hadlock    Humerus 29.3 mm  19w 4d                Maya    HC / AC 1.09      g    EFW (lb) 0 lb  EFW (oz) 11 oz  EFW by: Hadlock (BPD-HC-AC-FL)  Extended   6.5 mm  CM 2.1 mm    Rt Renal pelvis ap 6.0 mm    Lt Renal pelvis ap 5.1 mm    Head / Face / Neck  Cephalic index 0.76    Extremities / Bony Struc  FL / BPD 0.71    FL / AC 0.21    Other Structures   bpm    Fetal Anatomy  ===========    Cranium: normal  Lateral ventricles: normal  Choroid plexus: normal  Midline falx: normal  Cavum septi pellucidi: normal  Cerebellum: normal  Cisterna magna: normal  Head / Neck  Neck: normal  Nuchal fold: normal  Lips: normal  Profile: normal  Nose: normal  RVOT view: suboptimal  LVOT view: suboptimal  Heart / Thorax  4-chamber view: 4-chamber normal, septum suboptimal  Situs: situs solitus (normal)  Aortic arch view: normal  Ductal arch view: normal  SVC: normal  IVC: normal  3-vessel view: normal  3-vessel-trachea view: normal  Rt lung: normal  Lt lung: normal  Diaphragm: normal  Cord insertion: normal  Stomach: normal  Kidneys: abnormal  Bladder: normal  Genitals: normal  Abdomen  Rt kidney: abnormal  Rt kidney: pyelectasis  Lt kidney: abnormal  Lt kidney: pyelectasis  Liver: normal  Bowel: normal  Rt renal artery: normal  Lt renal artery: normal  Spine: Sagittal views normal; Transverse views suboptimal  Rt arm: normal  Lt arm: normal  Rt leg: normal  Lt leg: normal  Position of hands: normal  Position of feet: normal  Gender: female  Wants to know gender: yes    Maternal Structures  ===============    Uterus / Cervix  Uterus: Normal  Approach: Transabdominal  Cervical length 30.4 mm  Ovaries / Tubes / Adnexa  Rt ovary: Normal  Lt ovary: Normal    Consultation  ==========    Type: Abnormal Ultrasound Finding  I spent 10 minutes on the consultation today with the patient regarding findings from today's ultrasound exam. More than 50% of the  consultation was spent in face-to-face counseling and coordination of care.  Referring MD: Dr. SAMANTHA Smith  The finding of bilateral mild renal pelvis  dilation was discussed with the patient. We reviewed basic anatomy of the renal collecting  system and then discussed possible etiologies for renal pelvis dilation. When dilation of the renal pelvis is borderline or mild, most  cases will resolve spontaneously. However, if the renal dilation persists into the third trimester and is confirmed in the  period,  the most common conditions that can cause renal pelvis dilation are UPJ obstruction (ureteropelvic junction), UVJ obstruction  (ureterovesical junction) and VUR (vesicoureteral reflux). These conditions predispose infants/children to urinary tract infection, but  can be effectively followed and treated. Early and prompt treatment can decrease the incidence of upper urinary tract infection and  renal dysfunction.  F/U exam in the third trimester is recommended to determine if   evaluation is warranted.    Impression  =========    A detailed fetal anatomic ultrasound examination was performed for the following high risk indication: bilateral renal pelvis dilation on  today's exam. No major fetal structural malformations are identified; however, fetal imaging is incomplete today. Bilateral mild renal  pelvis dilation (AP diameter of ~ 5 mm) is identified.  A follow-up study will be scheduled to complete the fetal anatomic survey. Fetal size today is consistent with established gestational  age. Cervical length is normal. Placental location is normal without evidence of previa.

## 2019-09-04 ENCOUNTER — PATIENT MESSAGE (OUTPATIENT)
Dept: ADMINISTRATIVE | Facility: OTHER | Age: 29
End: 2019-09-04

## 2019-09-24 ENCOUNTER — PROCEDURE VISIT (OUTPATIENT)
Dept: MATERNAL FETAL MEDICINE | Facility: CLINIC | Age: 29
End: 2019-09-24
Payer: COMMERCIAL

## 2019-09-24 ENCOUNTER — TELEPHONE (OUTPATIENT)
Dept: MATERNAL FETAL MEDICINE | Facility: CLINIC | Age: 29
End: 2019-09-24

## 2019-09-24 DIAGNOSIS — Z36.89 ENCOUNTER FOR ULTRASOUND TO CHECK FETAL GROWTH: Primary | ICD-10-CM

## 2019-09-24 DIAGNOSIS — Z36.89 ENCOUNTER FOR ULTRASOUND TO CHECK FETAL GROWTH: ICD-10-CM

## 2019-09-24 DIAGNOSIS — O35.EXX0 ENCOUNTER FOR REPEAT ULTRASOUND OF FETAL PYELECTASIS, ANTEPARTUM, SINGLE OR UNSPECIFIED FETUS: ICD-10-CM

## 2019-09-24 PROCEDURE — 76816 OB US FOLLOW-UP PER FETUS: CPT | Mod: S$GLB,,, | Performed by: OBSTETRICS & GYNECOLOGY

## 2019-09-24 PROCEDURE — 99499 UNLISTED E&M SERVICE: CPT | Mod: S$GLB,,, | Performed by: OBSTETRICS & GYNECOLOGY

## 2019-09-24 PROCEDURE — 99499 NO LOS: ICD-10-PCS | Mod: S$GLB,,, | Performed by: OBSTETRICS & GYNECOLOGY

## 2019-09-24 PROCEDURE — 76816 PR  US,PREGNANT UTERUS,F/U,TRANSABD APP: ICD-10-PCS | Mod: S$GLB,,, | Performed by: OBSTETRICS & GYNECOLOGY

## 2019-09-24 NOTE — TELEPHONE ENCOUNTER
Patient called and scheduled.        ----- Message from Franchesca Pascual MD sent at 9/24/2019 11:30 AM CDT -----  Please schedule patient for 5 week follow up MD review (check fetal kidneys) and suboptimal anatomy

## 2019-09-25 ENCOUNTER — ROUTINE PRENATAL (OUTPATIENT)
Dept: OBSTETRICS AND GYNECOLOGY | Facility: CLINIC | Age: 29
End: 2019-09-25
Attending: OBSTETRICS & GYNECOLOGY
Payer: COMMERCIAL

## 2019-09-25 VITALS
SYSTOLIC BLOOD PRESSURE: 110 MMHG | DIASTOLIC BLOOD PRESSURE: 70 MMHG | BODY MASS INDEX: 22.96 KG/M2 | WEIGHT: 129.63 LBS

## 2019-09-25 DIAGNOSIS — Z34.90 PREGNANCY, UNSPECIFIED GESTATIONAL AGE: Primary | ICD-10-CM

## 2019-09-25 PROCEDURE — 0502F PR SUBSEQUENT PRENATAL CARE: ICD-10-PCS | Mod: CPTII,S$GLB,, | Performed by: OBSTETRICS & GYNECOLOGY

## 2019-09-25 PROCEDURE — 90686 IIV4 VACC NO PRSV 0.5 ML IM: CPT | Mod: S$GLB,,, | Performed by: OBSTETRICS & GYNECOLOGY

## 2019-09-25 PROCEDURE — 99999 PR PBB SHADOW E&M-EST. PATIENT-LVL III: CPT | Mod: PBBFAC,,, | Performed by: OBSTETRICS & GYNECOLOGY

## 2019-09-25 PROCEDURE — 99999 PR PBB SHADOW E&M-EST. PATIENT-LVL III: ICD-10-PCS | Mod: PBBFAC,,, | Performed by: OBSTETRICS & GYNECOLOGY

## 2019-09-25 PROCEDURE — 90686 FLU VACCINE (QUAD) GREATER THAN OR EQUAL TO 3YO PRESERVATIVE FREE IM: ICD-10-PCS | Mod: S$GLB,,, | Performed by: OBSTETRICS & GYNECOLOGY

## 2019-09-25 PROCEDURE — 0502F SUBSEQUENT PRENATAL CARE: CPT | Mod: CPTII,S$GLB,, | Performed by: OBSTETRICS & GYNECOLOGY

## 2019-09-25 PROCEDURE — 90471 FLU VACCINE (QUAD) GREATER THAN OR EQUAL TO 3YO PRESERVATIVE FREE IM: ICD-10-PCS | Mod: S$GLB,,, | Performed by: OBSTETRICS & GYNECOLOGY

## 2019-09-25 PROCEDURE — 90471 IMMUNIZATION ADMIN: CPT | Mod: S$GLB,,, | Performed by: OBSTETRICS & GYNECOLOGY

## 2019-09-25 NOTE — PROGRESS NOTES
Had reeval u/s recently pyelectasis still present. Explained in detail. All questions answered. Flu shot today. GTT next visit

## 2019-09-26 ENCOUNTER — PATIENT MESSAGE (OUTPATIENT)
Dept: OBSTETRICS AND GYNECOLOGY | Facility: CLINIC | Age: 29
End: 2019-09-26

## 2019-10-24 ENCOUNTER — ROUTINE PRENATAL (OUTPATIENT)
Dept: OBSTETRICS AND GYNECOLOGY | Facility: CLINIC | Age: 29
End: 2019-10-24
Attending: OBSTETRICS & GYNECOLOGY
Payer: COMMERCIAL

## 2019-10-24 ENCOUNTER — LAB VISIT (OUTPATIENT)
Dept: LAB | Facility: OTHER | Age: 29
End: 2019-10-24
Attending: OBSTETRICS & GYNECOLOGY
Payer: COMMERCIAL

## 2019-10-24 VITALS
SYSTOLIC BLOOD PRESSURE: 104 MMHG | BODY MASS INDEX: 24.04 KG/M2 | DIASTOLIC BLOOD PRESSURE: 70 MMHG | WEIGHT: 135.69 LBS

## 2019-10-24 DIAGNOSIS — Z34.80 SUPERVISION OF OTHER NORMAL PREGNANCY, ANTEPARTUM: Primary | ICD-10-CM

## 2019-10-24 DIAGNOSIS — Z34.90 PREGNANCY, UNSPECIFIED GESTATIONAL AGE: ICD-10-CM

## 2019-10-24 LAB
BASOPHILS # BLD AUTO: 0.02 K/UL (ref 0–0.2)
BASOPHILS NFR BLD: 0.2 % (ref 0–1.9)
DIFFERENTIAL METHOD: ABNORMAL
EOSINOPHIL # BLD AUTO: 0 K/UL (ref 0–0.5)
EOSINOPHIL NFR BLD: 0.4 % (ref 0–8)
ERYTHROCYTE [DISTWIDTH] IN BLOOD BY AUTOMATED COUNT: 13.3 % (ref 11.5–14.5)
GLUCOSE SERPL-MCNC: 75 MG/DL (ref 70–140)
HCT VFR BLD AUTO: 33.7 % (ref 37–48.5)
HGB BLD-MCNC: 11.4 G/DL (ref 12–16)
IMM GRANULOCYTES # BLD AUTO: 0.12 K/UL (ref 0–0.04)
IMM GRANULOCYTES NFR BLD AUTO: 1.5 % (ref 0–0.5)
LYMPHOCYTES # BLD AUTO: 1 K/UL (ref 1–4.8)
LYMPHOCYTES NFR BLD: 12.1 % (ref 18–48)
MCH RBC QN AUTO: 30.6 PG (ref 27–31)
MCHC RBC AUTO-ENTMCNC: 33.8 G/DL (ref 32–36)
MCV RBC AUTO: 90 FL (ref 82–98)
MONOCYTES # BLD AUTO: 0.6 K/UL (ref 0.3–1)
MONOCYTES NFR BLD: 6.9 % (ref 4–15)
NEUTROPHILS # BLD AUTO: 6.3 K/UL (ref 1.8–7.7)
NEUTROPHILS NFR BLD: 78.9 % (ref 38–73)
NRBC BLD-RTO: 0 /100 WBC
PLATELET # BLD AUTO: 199 K/UL (ref 150–350)
PMV BLD AUTO: 10.8 FL (ref 9.2–12.9)
RBC # BLD AUTO: 3.73 M/UL (ref 4–5.4)
WBC # BLD AUTO: 8.01 K/UL (ref 3.9–12.7)

## 2019-10-24 PROCEDURE — 85025 COMPLETE CBC W/AUTO DIFF WBC: CPT

## 2019-10-24 PROCEDURE — 0502F PR SUBSEQUENT PRENATAL CARE: ICD-10-PCS | Mod: CPTII,S$GLB,, | Performed by: OBSTETRICS & GYNECOLOGY

## 2019-10-24 PROCEDURE — 36415 COLL VENOUS BLD VENIPUNCTURE: CPT

## 2019-10-24 PROCEDURE — 0502F SUBSEQUENT PRENATAL CARE: CPT | Mod: CPTII,S$GLB,, | Performed by: OBSTETRICS & GYNECOLOGY

## 2019-10-24 PROCEDURE — 99999 PR PBB SHADOW E&M-EST. PATIENT-LVL III: CPT | Mod: PBBFAC,,, | Performed by: OBSTETRICS & GYNECOLOGY

## 2019-10-24 PROCEDURE — 99999 PR PBB SHADOW E&M-EST. PATIENT-LVL III: ICD-10-PCS | Mod: PBBFAC,,, | Performed by: OBSTETRICS & GYNECOLOGY

## 2019-10-24 PROCEDURE — 82950 GLUCOSE TEST: CPT

## 2019-10-29 ENCOUNTER — PROCEDURE VISIT (OUTPATIENT)
Dept: MATERNAL FETAL MEDICINE | Facility: CLINIC | Age: 29
End: 2019-10-29
Payer: COMMERCIAL

## 2019-10-29 DIAGNOSIS — Z36.89 ENCOUNTER FOR ULTRASOUND TO CHECK FETAL GROWTH: ICD-10-CM

## 2019-10-29 DIAGNOSIS — Z36.89 ENCOUNTER FOR ULTRASOUND TO ASSESS FETAL GROWTH: Primary | ICD-10-CM

## 2019-10-29 PROCEDURE — 99499 NO LOS: ICD-10-PCS | Mod: S$GLB,,, | Performed by: PEDIATRICS

## 2019-10-29 PROCEDURE — 99499 UNLISTED E&M SERVICE: CPT | Mod: S$GLB,,, | Performed by: PEDIATRICS

## 2019-10-29 PROCEDURE — 76816 PR  US,PREGNANT UTERUS,F/U,TRANSABD APP: ICD-10-PCS | Mod: S$GLB,,, | Performed by: PEDIATRICS

## 2019-10-29 PROCEDURE — 76816 OB US FOLLOW-UP PER FETUS: CPT | Mod: S$GLB,,, | Performed by: PEDIATRICS

## 2019-11-06 ENCOUNTER — PATIENT MESSAGE (OUTPATIENT)
Dept: MATERNAL FETAL MEDICINE | Facility: CLINIC | Age: 29
End: 2019-11-06

## 2019-11-07 ENCOUNTER — ROUTINE PRENATAL (OUTPATIENT)
Dept: OBSTETRICS AND GYNECOLOGY | Facility: CLINIC | Age: 29
End: 2019-11-07
Attending: OBSTETRICS & GYNECOLOGY
Payer: COMMERCIAL

## 2019-11-07 VITALS
BODY MASS INDEX: 24.82 KG/M2 | SYSTOLIC BLOOD PRESSURE: 110 MMHG | DIASTOLIC BLOOD PRESSURE: 70 MMHG | WEIGHT: 140.13 LBS

## 2019-11-07 DIAGNOSIS — Z23 NEED FOR TDAP VACCINATION: Primary | ICD-10-CM

## 2019-11-07 DIAGNOSIS — Z34.80 SUPERVISION OF OTHER NORMAL PREGNANCY, ANTEPARTUM: Primary | ICD-10-CM

## 2019-11-07 PROCEDURE — 90471 IMMUNIZATION ADMIN: CPT | Mod: S$GLB,,, | Performed by: OBSTETRICS & GYNECOLOGY

## 2019-11-07 PROCEDURE — 99999 PR PBB SHADOW E&M-EST. PATIENT-LVL I: CPT | Mod: PBBFAC,,,

## 2019-11-07 PROCEDURE — 99999 PR PBB SHADOW E&M-EST. PATIENT-LVL I: ICD-10-PCS | Mod: PBBFAC,,,

## 2019-11-07 PROCEDURE — 99999 PR PBB SHADOW E&M-EST. PATIENT-LVL III: ICD-10-PCS | Mod: PBBFAC,,, | Performed by: OBSTETRICS & GYNECOLOGY

## 2019-11-07 PROCEDURE — 90471 TDAP VACCINE GREATER THAN OR EQUAL TO 7YO IM: ICD-10-PCS | Mod: S$GLB,,, | Performed by: OBSTETRICS & GYNECOLOGY

## 2019-11-07 PROCEDURE — 99999 PR PBB SHADOW E&M-EST. PATIENT-LVL III: CPT | Mod: PBBFAC,,, | Performed by: OBSTETRICS & GYNECOLOGY

## 2019-11-07 PROCEDURE — 90715 TDAP VACCINE GREATER THAN OR EQUAL TO 7YO IM: ICD-10-PCS | Mod: S$GLB,,, | Performed by: OBSTETRICS & GYNECOLOGY

## 2019-11-07 PROCEDURE — 0502F PR SUBSEQUENT PRENATAL CARE: ICD-10-PCS | Mod: CPTII,S$GLB,, | Performed by: OBSTETRICS & GYNECOLOGY

## 2019-11-07 PROCEDURE — 0502F SUBSEQUENT PRENATAL CARE: CPT | Mod: CPTII,S$GLB,, | Performed by: OBSTETRICS & GYNECOLOGY

## 2019-11-07 PROCEDURE — 90715 TDAP VACCINE 7 YRS/> IM: CPT | Mod: S$GLB,,, | Performed by: OBSTETRICS & GYNECOLOGY

## 2019-11-07 NOTE — PROGRESS NOTES
Educational flyer reviewed the Tdap vaccination reviewed and provided to pt. Pt received Tdap injection as ordered by Dr Smith to right deltoid, pt toelrated well. Pt denies pain. Pt instructed and observed for 15 min post injection. No reaction noted.

## 2019-11-21 ENCOUNTER — ROUTINE PRENATAL (OUTPATIENT)
Dept: OBSTETRICS AND GYNECOLOGY | Facility: CLINIC | Age: 29
End: 2019-11-21
Attending: OBSTETRICS & GYNECOLOGY
Payer: COMMERCIAL

## 2019-11-21 VITALS
BODY MASS INDEX: 25.07 KG/M2 | WEIGHT: 141.56 LBS | DIASTOLIC BLOOD PRESSURE: 72 MMHG | SYSTOLIC BLOOD PRESSURE: 104 MMHG

## 2019-11-21 DIAGNOSIS — Z34.80 SUPERVISION OF OTHER NORMAL PREGNANCY, ANTEPARTUM: Primary | ICD-10-CM

## 2019-11-21 PROCEDURE — 99999 PR PBB SHADOW E&M-EST. PATIENT-LVL III: CPT | Mod: PBBFAC,,, | Performed by: OBSTETRICS & GYNECOLOGY

## 2019-11-21 PROCEDURE — 0502F SUBSEQUENT PRENATAL CARE: CPT | Mod: CPTII,S$GLB,, | Performed by: OBSTETRICS & GYNECOLOGY

## 2019-11-21 PROCEDURE — 0502F PR SUBSEQUENT PRENATAL CARE: ICD-10-PCS | Mod: CPTII,S$GLB,, | Performed by: OBSTETRICS & GYNECOLOGY

## 2019-11-21 PROCEDURE — 99999 PR PBB SHADOW E&M-EST. PATIENT-LVL III: ICD-10-PCS | Mod: PBBFAC,,, | Performed by: OBSTETRICS & GYNECOLOGY

## 2019-12-03 ENCOUNTER — PATIENT MESSAGE (OUTPATIENT)
Dept: OBSTETRICS AND GYNECOLOGY | Facility: CLINIC | Age: 29
End: 2019-12-03

## 2019-12-04 ENCOUNTER — HOSPITAL ENCOUNTER (EMERGENCY)
Facility: OTHER | Age: 29
Discharge: HOME | End: 2019-12-04
Attending: OBSTETRICS & GYNECOLOGY
Payer: COMMERCIAL

## 2019-12-04 VITALS
OXYGEN SATURATION: 100 % | HEART RATE: 81 BPM | RESPIRATION RATE: 16 BRPM | SYSTOLIC BLOOD PRESSURE: 119 MMHG | TEMPERATURE: 98 F | DIASTOLIC BLOOD PRESSURE: 62 MMHG
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
## 2019-12-04 DIAGNOSIS — Z3A.33 33 WEEKS GESTATION OF PREGNANCY: Primary | ICD-10-CM

## 2019-12-04 PROCEDURE — 99283 PR EMERGENCY DEPT VISIT,LEVEL III: ICD-10-PCS | Mod: 25,,, | Performed by: OBSTETRICS & GYNECOLOGY

## 2019-12-04 PROCEDURE — 59025 FETAL NON-STRESS TEST: CPT

## 2019-12-04 PROCEDURE — 99283 EMERGENCY DEPT VISIT LOW MDM: CPT | Mod: 25,,, | Performed by: OBSTETRICS & GYNECOLOGY

## 2019-12-04 PROCEDURE — 59025 PR FETAL 2N-STRESS TEST: ICD-10-PCS | Mod: 26,,, | Performed by: OBSTETRICS & GYNECOLOGY

## 2019-12-04 PROCEDURE — 99284 EMERGENCY DEPT VISIT MOD MDM: CPT | Mod: 25

## 2019-12-04 PROCEDURE — 59025 FETAL NON-STRESS TEST: CPT | Mod: 26,,, | Performed by: OBSTETRICS & GYNECOLOGY

## 2019-12-04 NOTE — DISCHARGE INSTRUCTIONS
Call clinic [phone] or L & D after hours at [phone] for vaginal bleeding, leakage of fluids, regular contractions every 5 mins for 2 hours, decreased fetal movements ( 10 kicks in 2 hours), headache not relieved by Tylenol, blurry vision, or temp of 100.4 or greater.  Begin doing fetal kick counts, at least 10 movements in 2 hours starting at 28 weeks gestation.  Keep next clinic appointment

## 2019-12-04 NOTE — TELEPHONE ENCOUNTER
Pt slipped last night and fell on butt. Positive fetal movement.  Reassured her but recommended she go to [facility] to be monitored.

## 2019-12-04 NOTE — ED NOTES
Patient discharged home self care. Discharge instructions given to pt at bedside. Pt verbalized understanding of instructions. Vital signs stable. Patient remained afebrile. NST reactive. Patient reports positive fetal movements. Patient denies vaginal bleeding and denies leakage of amniotic fluid. No apparent distress noted.

## 2019-12-04 NOTE — ED PROVIDER NOTES
Encounter Date: 2019       History   No chief complaint on file.    This is a 30 y/o  at 33 2/7 weeks who presents to the [facility] for evaluation after falling on her bottom last night around 9pm.  She denies abdominal trauma or hitting her head.  She denies vaginal bleeding, contractions, or leakage of fluid.  Fetal movement has been normal.  She is Rh positive.  This pregnancy is uncomplicated.          Review of patient's allergies indicates:   Allergen Reactions    No known drug allergies      Past Medical History:   Diagnosis Date    H/O elbow surgery     Oral contraceptive use      Past Surgical History:   Procedure Laterality Date    left elbow Left 2017    torn ligament     Family History   Problem Relation Age of Onset    No Known Problems Maternal Grandmother     No Known Problems Sister     Heart attack Maternal Grandfather     No Known Problems Paternal Grandmother     No Known Problems Paternal Grandfather     Skin cancer Other     Breast cancer Other     Colon cancer Neg Hx     Ovarian cancer Neg Hx      Social History     Tobacco Use    Smoking status: Never Smoker    Smokeless tobacco: Never Used   Substance Use Topics    Alcohol use: Yes    Drug use: No     Review of Systems   Constitutional: Negative for chills and fever.   HENT: Negative for nosebleeds.    Eyes: Negative for visual disturbance.   Respiratory: Negative for shortness of breath.    Cardiovascular: Negative for chest pain and leg swelling.   Gastrointestinal: Negative for abdominal pain, nausea and vomiting.   Genitourinary: Negative for dysuria, flank pain, pelvic pain, vaginal bleeding and vaginal discharge.   Musculoskeletal: Negative for back pain.   Skin: Negative for rash.   Neurological: Negative for dizziness and headaches.       Physical Exam     Initial Vitals   BP Pulse Resp Temp SpO2   -- -- -- -- --      MAP       --       LMP 04/15/2019    /62   Pulse 81   Temp 98.2 °F (36.8 °C)  (Oral)   Resp 16   LMP 04/15/2019   SpO2 100%   Breastfeeding? No       Physical Exam    Vitals reviewed.  Constitutional: She appears well-developed and well-nourished. No distress.   Cardiovascular: Normal rate.   Pulmonary/Chest: No respiratory distress.   Abdominal: Soft. She exhibits no distension. There is no tenderness. There is no rebound and no guarding.   Genitourinary: Uterus normal.   Musculoskeletal: She exhibits no edema or tenderness.   Neurological: She is alert and oriented to person, place, and time.   Skin: Skin is warm and dry.   Psychiatric: She has a normal mood and affect.         ED Course   Obtain Fetal nonstress test (NST)  Date/Time: 12/4/2019 9:52 AM  Performed by: Shweta Grant MD  Authorized by: Shweta Grant MD     Nonstress Test:     Variability:  6-25 BPM    Decelerations:  None    Accelerations:  15 bpm    Baseline:  150    Contraction Frequency:  One contractions in 20 minutes  Biophysical Profile:     Nonstress Test Interpretation: reactive      Overall Impression:  Reassuring      Labs Reviewed - No data to display       Imaging Results    None          Medical Decision Making:   ED Management:  Vitals normal  NST reactive/reassuring, no regular contractions  Exam benign  Rh positive  Precautions given                   ED Course as of Dec 04 0949   Wed Dec 04, 2019   0938 S/p fall last night, Rh positive.   reactive, denies pain. Exam benign    [AR]      ED Course User Index  [AR] Shweta Grant MD                Clinical Impression:       ICD-10-CM ICD-9-CM   1. 33 weeks gestation of pregnancy Z3A.33 V22.2   2. Fall, initial encounter W19.XXXA E888.9         Disposition:   Disposition: Discharged  Condition: Stable                     Shweta Grant MD  12/04/19 1003

## 2019-12-06 ENCOUNTER — HOSPITAL ENCOUNTER (EMERGENCY)
Facility: OTHER | Age: 29
Discharge: HOME | End: 2019-12-06
Attending: OBSTETRICS & GYNECOLOGY
Payer: COMMERCIAL

## 2019-12-06 ENCOUNTER — ROUTINE PRENATAL (OUTPATIENT)
Dept: OBSTETRICS AND GYNECOLOGY | Facility: CLINIC | Age: 29
End: 2019-12-06
Payer: COMMERCIAL

## 2019-12-06 VITALS
OXYGEN SATURATION: 100 % | SYSTOLIC BLOOD PRESSURE: 94 MMHG | DIASTOLIC BLOOD PRESSURE: 56 MMHG | TEMPERATURE: 98 F | RESPIRATION RATE: 16 BRPM

## 2019-12-06 VITALS
DIASTOLIC BLOOD PRESSURE: 72 MMHG | BODY MASS INDEX: 25.68 KG/M2 | WEIGHT: 144.94 LBS | SYSTOLIC BLOOD PRESSURE: 104 MMHG

## 2019-12-06 DIAGNOSIS — O47.9 UTERINE CONTRACTIONS DURING PREGNANCY: Primary | ICD-10-CM

## 2019-12-06 DIAGNOSIS — Z3A.33 33 WEEKS GESTATION OF PREGNANCY: ICD-10-CM

## 2019-12-06 DIAGNOSIS — O26.893 VAGINAL DISCHARGE DURING PREGNANCY IN THIRD TRIMESTER: ICD-10-CM

## 2019-12-06 DIAGNOSIS — N89.8 VAGINAL DISCHARGE DURING PREGNANCY IN THIRD TRIMESTER: ICD-10-CM

## 2019-12-06 DIAGNOSIS — N76.0 BV (BACTERIAL VAGINOSIS): ICD-10-CM

## 2019-12-06 DIAGNOSIS — O47.03 PRETERM UTERINE CONTRACTIONS IN THIRD TRIMESTER, ANTEPARTUM: Primary | ICD-10-CM

## 2019-12-06 DIAGNOSIS — B96.89 BV (BACTERIAL VAGINOSIS): ICD-10-CM

## 2019-12-06 PROCEDURE — 63600175 PHARM REV CODE 636 W HCPCS: Performed by: OBSTETRICS & GYNECOLOGY

## 2019-12-06 PROCEDURE — 0502F PR SUBSEQUENT PRENATAL CARE: ICD-10-PCS | Mod: CPTII,S$GLB,, | Performed by: NURSE PRACTITIONER

## 2019-12-06 PROCEDURE — 59025 PR FETAL 2N-STRESS TEST: ICD-10-PCS | Mod: 26,,, | Performed by: OBSTETRICS & GYNECOLOGY

## 2019-12-06 PROCEDURE — 96360 HYDRATION IV INFUSION INIT: CPT

## 2019-12-06 PROCEDURE — 0502F SUBSEQUENT PRENATAL CARE: CPT | Mod: CPTII,S$GLB,, | Performed by: NURSE PRACTITIONER

## 2019-12-06 PROCEDURE — 99999 PR PBB SHADOW E&M-EST. PATIENT-LVL III: CPT | Mod: PBBFAC,,, | Performed by: NURSE PRACTITIONER

## 2019-12-06 PROCEDURE — 99999 PR PBB SHADOW E&M-EST. PATIENT-LVL III: ICD-10-PCS | Mod: PBBFAC,,, | Performed by: NURSE PRACTITIONER

## 2019-12-06 PROCEDURE — 99284 EMERGENCY DEPT VISIT MOD MDM: CPT | Mod: 25

## 2019-12-06 PROCEDURE — 59025 FETAL NON-STRESS TEST: CPT

## 2019-12-06 PROCEDURE — 99284 PR EMERGENCY DEPT VISIT,LEVEL IV: ICD-10-PCS | Mod: 25,,, | Performed by: OBSTETRICS & GYNECOLOGY

## 2019-12-06 PROCEDURE — 99284 EMERGENCY DEPT VISIT MOD MDM: CPT | Mod: 25,,, | Performed by: OBSTETRICS & GYNECOLOGY

## 2019-12-06 PROCEDURE — 59025 FETAL NON-STRESS TEST: CPT | Mod: 26,,, | Performed by: OBSTETRICS & GYNECOLOGY

## 2019-12-06 RX ORDER — METRONIDAZOLE 500 MG/1
500 TABLET ORAL 2 TIMES DAILY
Qty: 14 TABLET | Refills: 0 | Status: SHIPPED | OUTPATIENT
Start: 2019-12-06 | End: 2019-12-13

## 2019-12-06 RX ADMIN — SODIUM CHLORIDE, SODIUM LACTATE, POTASSIUM CHLORIDE, AND CALCIUM CHLORIDE 1000 ML: .6; .31; .03; .02 INJECTION, SOLUTION INTRAVENOUS at 12:12

## 2019-12-06 NOTE — ED PROVIDER NOTES
Encounter Date: 2019       History     Chief Complaint   Patient presents with    Contractions     Melinda Parekh is a 29 y.o. [id number] at 33w4d presents complaining of contractions. She fell on Tuesday and was evaluated her and found to be stable and discharged. Last night she was feeling consistent contractions and in clinic this morning her cervix was closed. She is being sent her for prolonged monitoring for 1-2 hours for continued evaluation of contractions.     This IUP is complicated by nothing.  Patient denies contractions, denies vaginal bleeding, denies LOF.   Fetal Movement: normal.          Review of patient's allergies indicates:   Allergen Reactions    No known drug allergies      Past Medical History:   Diagnosis Date    H/O elbow surgery     Oral contraceptive use      Past Surgical History:   Procedure Laterality Date    left elbow Left 2017    torn ligament     Family History   Problem Relation Age of Onset    No Known Problems Maternal Grandmother     No Known Problems Sister     Heart attack Maternal Grandfather     No Known Problems Paternal Grandmother     No Known Problems Paternal Grandfather     Skin cancer Other     Breast cancer Other     Colon cancer Neg Hx     Ovarian cancer Neg Hx      Social History     Tobacco Use    Smoking status: Never Smoker    Smokeless tobacco: Never Used   Substance Use Topics    Alcohol use: Yes    Drug use: No     Review of Systems   Constitutional: Negative for chills and fever.   HENT: Negative for sore throat.    Eyes: Negative for visual disturbance.   Respiratory: Negative for shortness of breath.    Cardiovascular: Negative for chest pain.   Gastrointestinal: Positive for abdominal pain. Negative for nausea and vomiting.   Genitourinary: Negative for dysuria, flank pain, pelvic pain, vaginal bleeding and vaginal discharge.   Musculoskeletal: Negative for back pain.   Skin: Negative for rash.   Neurological: Negative for  weakness and headaches.   Hematological: Does not bruise/bleed easily.       Physical Exam     Initial Vitals   BP Pulse Resp Temp SpO2   12/06/19 1100 -- 12/06/19 1055 12/06/19 1055 --   121/80  16 97.7 °F (36.5 °C)       MAP       --              BP (!) 94/56   Temp 97.7 °F (36.5 °C) (Axillary)   Resp 16   LMP 04/15/2019   SpO2 100%     Physical Exam    Vitals reviewed.  Constitutional: Vital signs are normal. She appears well-developed and well-nourished. Breathing: Normal.   HENT:   Head: Normocephalic and atraumatic.   Eyes: EOM are normal.   Neck: Normal range of motion.   Cardiovascular: Normal rate, intact distal pulses and normal pulses.   Pulmonary/Chest: No respiratory distress.   Abdominal: Soft. She exhibits no distension. There is no tenderness. There is no rebound and no guarding.   Genitourinary: Uterus normal.   Musculoskeletal: Normal range of motion. She exhibits no edema or tenderness.   Neurological: She is alert and oriented to person, place, and time. She has normal strength.   Skin: Skin is warm and dry.   Psychiatric: Her behavior is normal.     OB LABOR EXAM:   Pre-Term Labor: No.   Membranes ruptured: No.   Method: Sterile vaginal exam per MD.   Vaginal Bleeding: none present.     Dilatation: 0.   Station: -4.   Effacement: 40%.             ED Course   Obtain Fetal nonstress test (NST)  Date/Time: 12/6/2019 5:34 PM  Performed by: Shweta Grant MD  Authorized by: Juanis Lorenz MD     Nonstress Test:     Variability:  6-25 BPM    Decelerations:  None    Accelerations:  15 bpm    Baseline:  140    Uterine Irritability: Yes      Contractions:  Not present  Biophysical Profile:     Nonstress Test Interpretation: reactive      Overall Impression:  Reassuring      Labs Reviewed - No data to display       Imaging Results    None          Medical Decision Making:   ED Management:  VSS  NST reactive and reassuring, no ctx on monitor, irritability  Will watch for 1-2 hours  Per pt:  pt history of contractions with  labor in last pregnancy at 36 weeks, given 3 'shots' contractions stop and she delivered at 39 weeks  Ctx q 10 m  Fluids for irritability on monitor  Irritability decreased, cervix unchanged   labor precautions given   Other:   I have discussed this case with another health care provider.       <> Summary of the Discussion: Dr. Grant              Attending Attestation:   Physician Attestation Statement for Resident:  As the supervising MD   Physician Attestation Statement: I have personally seen and examined this patient.   I agree with the above history. -:   As the supervising MD I agree with the above PE.    As the supervising MD I agree with the above treatment, course, plan, and disposition.   -: Patient evaluated and found to be stable, agree with resident's assessment and plan.  Ffn was not collected due to the patient being checked in the office prior to evaluation in the [facility].  I was personally present during the critical portions of the procedure(s) performed by the resident and was immediately available in the ED to provide services and assistance as needed during the entire procedure.  I have reviewed the following: old records at this facility.                    ED Course as of Dec 06 1604   Fri Dec 06, 2019   1332 NST reactive/reassuring, rare CTX, repeat SVE unchanged after IVF bolus. PTL precautions given.     [AR]      ED Course User Index  [AR] Shweta Grant MD                Clinical Impression:       ICD-10-CM ICD-9-CM   1. Uterine contractions during pregnancy O62.2 661.20         Disposition:   Disposition: Discharged  Condition: Stable      Aneesh Lorenz MD  OBGYN PGY-1               Juanis Lorenz MD  Resident  19 1604       Shweta Grant MD  19 1738

## 2019-12-06 NOTE — PROGRESS NOTES
Patient presents today for routine OB visit.  Patient complained that last night if 9:00 p.m. to midnight she was having uterine contractions since for a most predominantly felt to be right lower quadrant of abdomen.  For past hour and a half, she also c/o uterine ctx's, at least 5 in an hour.  Pt appears to be uncomfortable and skin is cool and slightly clammy.  She denies fever or chills, denies change in appetite, denies n/v/d.  Exam: (Speculum and manual exam does without use of K-Y gel).  Gravid abdomen soft to palpation, non-tender. Ext genitalia without lesions.  Mod amt off-white, homogenous vaginal discharge noted - KOH/WetPrep collected (+BV).   Cervix visualized and closed.  A&P:  1.  Flagyl rx sent 500 mg BID x 7 d  2.  To OB E.D. for prolonged monitoring for possible ctx's.  3.  Labor/bleeding/decreased FM prec given  4.  Report called to  in OB E.D. At 1030am, and patient left in NAD to go to OB E.D.

## 2019-12-06 NOTE — ED NOTES
Pt seen in clinic today with c/o ctxs every 10 minutes.  Denies vb, lof.  Stares +fm.   Fall on Tuesday night to buttocks, no abdominal trauma   Seen in [facility] due to the fall and released home after evaluation

## 2019-12-06 NOTE — DISCHARGE INSTRUCTIONS
Call clinic [phone] or L & D after hours at [phone] for vaginal bleeding, leakage of fluids, regular contractions every 5 mins for 2 hours, decreased fetal movements ( 10 kicks in 2 hours), headache not relieved by Tylenol, blurry vision, or temp of 100.4 or greater.  Begin doing fetal kick counts, at least 10 movements in 2 hours starting at 28 weeks gestation.  Keep next clinic appointment

## 2019-12-19 ENCOUNTER — LAB VISIT (OUTPATIENT)
Dept: LAB | Facility: OTHER | Age: 29
End: 2019-12-19
Attending: OBSTETRICS & GYNECOLOGY
Payer: COMMERCIAL

## 2019-12-19 ENCOUNTER — ROUTINE PRENATAL (OUTPATIENT)
Dept: OBSTETRICS AND GYNECOLOGY | Facility: CLINIC | Age: 29
End: 2019-12-19
Attending: OBSTETRICS & GYNECOLOGY
Payer: COMMERCIAL

## 2019-12-19 VITALS
BODY MASS INDEX: 26.11 KG/M2 | DIASTOLIC BLOOD PRESSURE: 70 MMHG | SYSTOLIC BLOOD PRESSURE: 100 MMHG | WEIGHT: 147.38 LBS

## 2019-12-19 DIAGNOSIS — Z34.80 SUPERVISION OF OTHER NORMAL PREGNANCY, ANTEPARTUM: Primary | ICD-10-CM

## 2019-12-19 DIAGNOSIS — Z34.80 SUPERVISION OF OTHER NORMAL PREGNANCY, ANTEPARTUM: ICD-10-CM

## 2019-12-19 LAB
BASOPHILS # BLD AUTO: 0.02 K/UL (ref 0–0.2)
BASOPHILS NFR BLD: 0.3 % (ref 0–1.9)
DIFFERENTIAL METHOD: ABNORMAL
EOSINOPHIL # BLD AUTO: 0 K/UL (ref 0–0.5)
EOSINOPHIL NFR BLD: 0.3 % (ref 0–8)
ERYTHROCYTE [DISTWIDTH] IN BLOOD BY AUTOMATED COUNT: 12.9 % (ref 11.5–14.5)
HCT VFR BLD AUTO: 33.2 % (ref 37–48.5)
HGB BLD-MCNC: 11.2 G/DL (ref 12–16)
IMM GRANULOCYTES # BLD AUTO: 0.12 K/UL (ref 0–0.04)
IMM GRANULOCYTES NFR BLD AUTO: 1.8 % (ref 0–0.5)
LYMPHOCYTES # BLD AUTO: 1 K/UL (ref 1–4.8)
LYMPHOCYTES NFR BLD: 15.4 % (ref 18–48)
MCH RBC QN AUTO: 30.1 PG (ref 27–31)
MCHC RBC AUTO-ENTMCNC: 33.7 G/DL (ref 32–36)
MCV RBC AUTO: 89 FL (ref 82–98)
MONOCYTES # BLD AUTO: 0.5 K/UL (ref 0.3–1)
MONOCYTES NFR BLD: 7.6 % (ref 4–15)
NEUTROPHILS # BLD AUTO: 5 K/UL (ref 1.8–7.7)
NEUTROPHILS NFR BLD: 74.6 % (ref 38–73)
NRBC BLD-RTO: 0 /100 WBC
PLATELET # BLD AUTO: 190 K/UL (ref 150–350)
PMV BLD AUTO: 10.6 FL (ref 9.2–12.9)
RBC # BLD AUTO: 3.72 M/UL (ref 4–5.4)
WBC # BLD AUTO: 6.67 K/UL (ref 3.9–12.7)

## 2019-12-19 PROCEDURE — 87081 CULTURE SCREEN ONLY: CPT

## 2019-12-19 PROCEDURE — 36415 COLL VENOUS BLD VENIPUNCTURE: CPT

## 2019-12-19 PROCEDURE — 86703 HIV-1/HIV-2 1 RESULT ANTBDY: CPT

## 2019-12-19 PROCEDURE — 86592 SYPHILIS TEST NON-TREP QUAL: CPT

## 2019-12-19 PROCEDURE — 99999 PR PBB SHADOW E&M-EST. PATIENT-LVL III: ICD-10-PCS | Mod: PBBFAC,,, | Performed by: OBSTETRICS & GYNECOLOGY

## 2019-12-19 PROCEDURE — 85025 COMPLETE CBC W/AUTO DIFF WBC: CPT

## 2019-12-19 PROCEDURE — 99999 PR PBB SHADOW E&M-EST. PATIENT-LVL III: CPT | Mod: PBBFAC,,, | Performed by: OBSTETRICS & GYNECOLOGY

## 2019-12-19 PROCEDURE — 0502F PR SUBSEQUENT PRENATAL CARE: ICD-10-PCS | Mod: CPTII,S$GLB,, | Performed by: OBSTETRICS & GYNECOLOGY

## 2019-12-19 PROCEDURE — 0502F SUBSEQUENT PRENATAL CARE: CPT | Mod: CPTII,S$GLB,, | Performed by: OBSTETRICS & GYNECOLOGY

## 2019-12-19 NOTE — PROGRESS NOTES
GBBS culture done. Consents signed. Answered all questions. Good FM. Labor precautions explained. 3rd trimester labs today. Wants induction at 39 weeks. Knows I will be out of town

## 2019-12-19 NOTE — Clinical Note
Schedule induction jan 13th 8 pmcytotecLTCDx- term pregnancyHospitalist to deliverI will be out and pt aware

## 2019-12-20 ENCOUNTER — TELEPHONE (OUTPATIENT)
Dept: OBSTETRICS AND GYNECOLOGY | Facility: CLINIC | Age: 29
End: 2019-12-20

## 2019-12-20 ENCOUNTER — PATIENT MESSAGE (OUTPATIENT)
Dept: OBSTETRICS AND GYNECOLOGY | Facility: CLINIC | Age: 29
End: 2019-12-20

## 2019-12-20 DIAGNOSIS — Z34.90 TERM PREGNANCY: Primary | ICD-10-CM

## 2019-12-20 LAB
HIV 1+2 AB+HIV1 P24 AG SERPL QL IA: NEGATIVE
RPR SER QL: NORMAL

## 2019-12-20 NOTE — TELEPHONE ENCOUNTER
----- Message from Pili Smith MD sent at 12/19/2019  5:29 PM CST -----  Schedule induction jan 13th 8 pm  cytotec  LTC  Dx- term pregnancy  Hospitalist to deliver  I will be out and pt aware

## 2019-12-21 LAB — BACTERIA SPEC AEROBE CULT: NORMAL

## 2019-12-26 ENCOUNTER — ROUTINE PRENATAL (OUTPATIENT)
Dept: OBSTETRICS AND GYNECOLOGY | Facility: CLINIC | Age: 29
End: 2019-12-26
Attending: OBSTETRICS & GYNECOLOGY
Payer: COMMERCIAL

## 2019-12-26 ENCOUNTER — PROCEDURE VISIT (OUTPATIENT)
Dept: MATERNAL FETAL MEDICINE | Facility: CLINIC | Age: 29
End: 2019-12-26
Attending: OBSTETRICS & GYNECOLOGY
Payer: COMMERCIAL

## 2019-12-26 VITALS
WEIGHT: 146.81 LBS | DIASTOLIC BLOOD PRESSURE: 60 MMHG | SYSTOLIC BLOOD PRESSURE: 100 MMHG | BODY MASS INDEX: 26.01 KG/M2

## 2019-12-26 DIAGNOSIS — Z36.89 ENCOUNTER FOR ULTRASOUND TO ASSESS FETAL GROWTH: ICD-10-CM

## 2019-12-26 DIAGNOSIS — Z34.80 SUPERVISION OF OTHER NORMAL PREGNANCY, ANTEPARTUM: Primary | ICD-10-CM

## 2019-12-26 DIAGNOSIS — O35.EXX0 ENCOUNTER FOR REPEAT ULTRASOUND OF FETAL PYELECTASIS, ANTEPARTUM, SINGLE OR UNSPECIFIED FETUS: ICD-10-CM

## 2019-12-26 PROCEDURE — 0502F PR SUBSEQUENT PRENATAL CARE: ICD-10-PCS | Mod: CPTII,S$GLB,, | Performed by: OBSTETRICS & GYNECOLOGY

## 2019-12-26 PROCEDURE — 99999 PR PBB SHADOW E&M-EST. PATIENT-LVL III: CPT | Mod: PBBFAC,,, | Performed by: OBSTETRICS & GYNECOLOGY

## 2019-12-26 PROCEDURE — 0502F SUBSEQUENT PRENATAL CARE: CPT | Mod: CPTII,S$GLB,, | Performed by: OBSTETRICS & GYNECOLOGY

## 2019-12-26 PROCEDURE — 99999 PR PBB SHADOW E&M-EST. PATIENT-LVL III: ICD-10-PCS | Mod: PBBFAC,,, | Performed by: OBSTETRICS & GYNECOLOGY

## 2019-12-26 PROCEDURE — 76816 OB US FOLLOW-UP PER FETUS: CPT | Mod: S$GLB,,, | Performed by: OBSTETRICS & GYNECOLOGY

## 2019-12-26 PROCEDURE — 76816 PR  US,PREGNANT UTERUS,F/U,TRANSABD APP: ICD-10-PCS | Mod: S$GLB,,, | Performed by: OBSTETRICS & GYNECOLOGY

## 2020-01-02 ENCOUNTER — ROUTINE PRENATAL (OUTPATIENT)
Dept: OBSTETRICS AND GYNECOLOGY | Facility: CLINIC | Age: 30
End: 2020-01-02
Attending: OBSTETRICS & GYNECOLOGY
Payer: COMMERCIAL

## 2020-01-02 ENCOUNTER — HOSPITAL ENCOUNTER (INPATIENT)
Facility: OTHER | Age: 30
LOS: 2 days | Discharge: HOME | End: 2020-01-04
Attending: OBSTETRICS & GYNECOLOGY | Admitting: OBSTETRICS & GYNECOLOGY
Payer: COMMERCIAL

## 2020-01-02 VITALS
SYSTOLIC BLOOD PRESSURE: 100 MMHG | WEIGHT: 146.63 LBS | DIASTOLIC BLOOD PRESSURE: 64 MMHG | BODY MASS INDEX: 25.97 KG/M2

## 2020-01-02 DIAGNOSIS — O42.90 LEAKAGE OF AMNIOTIC FLUID: ICD-10-CM

## 2020-01-02 DIAGNOSIS — Z37.9 NORMAL LABOR: ICD-10-CM

## 2020-01-02 DIAGNOSIS — Z34.80 SUPERVISION OF OTHER NORMAL PREGNANCY, ANTEPARTUM: Primary | ICD-10-CM

## 2020-01-02 PROBLEM — Z34.90 PREGNANCY: Status: ACTIVE | Noted: 2020-01-02

## 2020-01-02 PROCEDURE — 11000001 HC ACUTE MED/SURG PRIVATE ROOM

## 2020-01-02 PROCEDURE — 99285 EMERGENCY DEPT VISIT HI MDM: CPT | Mod: 25

## 2020-01-02 PROCEDURE — 99283 PR EMERGENCY DEPT VISIT,LEVEL III: ICD-10-PCS | Mod: 25,,, | Performed by: OBSTETRICS & GYNECOLOGY

## 2020-01-02 PROCEDURE — 59025 OBTAIN FETAL NONSTRESS TEST (NST): ICD-10-PCS | Mod: 26,,, | Performed by: OBSTETRICS & GYNECOLOGY

## 2020-01-02 PROCEDURE — 59025 FETAL NON-STRESS TEST: CPT

## 2020-01-02 PROCEDURE — 99999 PR PBB SHADOW E&M-EST. PATIENT-LVL III: ICD-10-PCS | Mod: PBBFAC,,, | Performed by: OBSTETRICS & GYNECOLOGY

## 2020-01-02 PROCEDURE — 0502F PR SUBSEQUENT PRENATAL CARE: ICD-10-PCS | Mod: CPTII,S$GLB,, | Performed by: OBSTETRICS & GYNECOLOGY

## 2020-01-02 PROCEDURE — 99283 EMERGENCY DEPT VISIT LOW MDM: CPT | Mod: 25,,, | Performed by: OBSTETRICS & GYNECOLOGY

## 2020-01-02 PROCEDURE — 99999 PR PBB SHADOW E&M-EST. PATIENT-LVL III: CPT | Mod: PBBFAC,,, | Performed by: OBSTETRICS & GYNECOLOGY

## 2020-01-02 PROCEDURE — 0502F SUBSEQUENT PRENATAL CARE: CPT | Mod: CPTII,S$GLB,, | Performed by: OBSTETRICS & GYNECOLOGY

## 2020-01-02 PROCEDURE — 59025 FETAL NON-STRESS TEST: CPT | Mod: 26,,, | Performed by: OBSTETRICS & GYNECOLOGY

## 2020-01-03 PROBLEM — O42.90 LEAKAGE OF AMNIOTIC FLUID: Status: ACTIVE | Noted: 2020-01-03

## 2020-01-03 PROBLEM — Z37.9 NORMAL LABOR: Status: RESOLVED | Noted: 2020-01-02 | Resolved: 2020-01-03

## 2020-01-03 PROBLEM — Z34.90 PREGNANCY: Status: RESOLVED | Noted: 2020-01-02 | Resolved: 2020-01-03

## 2020-01-03 PROBLEM — O42.90 LEAKAGE OF AMNIOTIC FLUID: Status: RESOLVED | Noted: 2020-01-03 | Resolved: 2020-01-03

## 2020-01-03 LAB
ABO + RH BLD: NORMAL
BASOPHILS # BLD AUTO: 0.02 K/UL (ref 0–0.2)
BASOPHILS NFR BLD: 0.2 % (ref 0–1.9)
BLD GP AB SCN CELLS X3 SERPL QL: NORMAL
DIFFERENTIAL METHOD: ABNORMAL
EOSINOPHIL # BLD AUTO: 0.1 K/UL (ref 0–0.5)
EOSINOPHIL NFR BLD: 1 % (ref 0–8)
ERYTHROCYTE [DISTWIDTH] IN BLOOD BY AUTOMATED COUNT: 12.6 % (ref 11.5–14.5)
HCT VFR BLD AUTO: 31.5 % (ref 37–48.5)
HGB BLD-MCNC: 11 G/DL (ref 12–16)
IMM GRANULOCYTES # BLD AUTO: 0.1 K/UL (ref 0–0.04)
IMM GRANULOCYTES NFR BLD AUTO: 1 % (ref 0–0.5)
LYMPHOCYTES # BLD AUTO: 1.8 K/UL (ref 1–4.8)
LYMPHOCYTES NFR BLD: 18.7 % (ref 18–48)
MCH RBC QN AUTO: 30.1 PG (ref 27–31)
MCHC RBC AUTO-ENTMCNC: 34.9 G/DL (ref 32–36)
MCV RBC AUTO: 86 FL (ref 82–98)
MONOCYTES # BLD AUTO: 0.8 K/UL (ref 0.3–1)
MONOCYTES NFR BLD: 7.9 % (ref 4–15)
NEUTROPHILS # BLD AUTO: 6.9 K/UL (ref 1.8–7.7)
NEUTROPHILS NFR BLD: 71.2 % (ref 38–73)
NRBC BLD-RTO: 0 /100 WBC
PLATELET # BLD AUTO: 189 K/UL (ref 150–350)
PMV BLD AUTO: 10.4 FL (ref 9.2–12.9)
RBC # BLD AUTO: 3.65 M/UL (ref 4–5.4)
WBC # BLD AUTO: 9.63 K/UL (ref 3.9–12.7)

## 2020-01-03 PROCEDURE — 63600175 PHARM REV CODE 636 W HCPCS: Performed by: OBSTETRICS & GYNECOLOGY

## 2020-01-03 PROCEDURE — 72200005 HC VAGINAL DELIVERY LEVEL II

## 2020-01-03 PROCEDURE — 59400 OBSTETRICAL CARE: CPT | Mod: AT,,, | Performed by: OBSTETRICS & GYNECOLOGY

## 2020-01-03 PROCEDURE — 72100003 HC LABOR CARE, EA. ADDL. 8 HRS

## 2020-01-03 PROCEDURE — 25000003 PHARM REV CODE 250: Performed by: OBSTETRICS & GYNECOLOGY

## 2020-01-03 PROCEDURE — 0502F PR SUBSEQUENT PRENATAL CARE: ICD-10-PCS | Mod: ,,, | Performed by: OBSTETRICS & GYNECOLOGY

## 2020-01-03 PROCEDURE — 85025 COMPLETE CBC W/AUTO DIFF WBC: CPT

## 2020-01-03 PROCEDURE — 0502F SUBSEQUENT PRENATAL CARE: CPT | Mod: ,,, | Performed by: OBSTETRICS & GYNECOLOGY

## 2020-01-03 PROCEDURE — 72100002 HC LABOR CARE, 1ST 8 HOURS

## 2020-01-03 PROCEDURE — 51702 INSERT TEMP BLADDER CATH: CPT

## 2020-01-03 PROCEDURE — 86900 BLOOD TYPING SEROLOGIC ABO: CPT

## 2020-01-03 PROCEDURE — 11000001 HC ACUTE MED/SURG PRIVATE ROOM

## 2020-01-03 PROCEDURE — 59400 PR FULL ROUT OBSTE CARE,VAGINAL DELIV: ICD-10-PCS | Mod: AT,,, | Performed by: OBSTETRICS & GYNECOLOGY

## 2020-01-03 RX ORDER — OXYCODONE AND ACETAMINOPHEN 5; 325 MG/1; MG/1
1 TABLET ORAL EVERY 4 HOURS PRN
Status: DISCONTINUED | OUTPATIENT
Start: 2020-01-03 | End: 2020-01-04 | Stop reason: HOSPADM

## 2020-01-03 RX ORDER — OXYTOCIN/RINGER'S LACTATE 30/500 ML
334 PLASTIC BAG, INJECTION (ML) INTRAVENOUS ONCE
Status: COMPLETED | OUTPATIENT
Start: 2020-01-03 | End: 2020-01-03

## 2020-01-03 RX ORDER — ONDANSETRON 8 MG/1
8 TABLET, ORALLY DISINTEGRATING ORAL EVERY 8 HOURS PRN
Status: DISCONTINUED | OUTPATIENT
Start: 2020-01-03 | End: 2020-01-04 | Stop reason: HOSPADM

## 2020-01-03 RX ORDER — DIPHENHYDRAMINE HCL 25 MG
25 CAPSULE ORAL EVERY 4 HOURS PRN
Status: DISCONTINUED | OUTPATIENT
Start: 2020-01-03 | End: 2020-01-04 | Stop reason: HOSPADM

## 2020-01-03 RX ORDER — PRENATAL WITH FERROUS FUM AND FOLIC ACID 3080; 920; 120; 400; 22; 1.84; 3; 20; 10; 1; 12; 200; 27; 25; 2 [IU]/1; [IU]/1; MG/1; [IU]/1; MG/1; MG/1; MG/1; MG/1; MG/1; MG/1; UG/1; MG/1; MG/1; MG/1; MG/1
1 TABLET ORAL DAILY
Status: DISCONTINUED | OUTPATIENT
Start: 2020-01-03 | End: 2020-01-04 | Stop reason: HOSPADM

## 2020-01-03 RX ORDER — SIMETHICONE 80 MG
1 TABLET,CHEWABLE ORAL EVERY 6 HOURS PRN
Status: DISCONTINUED | OUTPATIENT
Start: 2020-01-03 | End: 2020-01-04 | Stop reason: HOSPADM

## 2020-01-03 RX ORDER — CARBOPROST TROMETHAMINE 250 UG/ML
INJECTION, SOLUTION INTRAMUSCULAR
Status: DISCONTINUED
Start: 2020-01-03 | End: 2020-01-03 | Stop reason: WASHOUT

## 2020-01-03 RX ORDER — SIMETHICONE 80 MG
1 TABLET,CHEWABLE ORAL 4 TIMES DAILY PRN
Status: DISCONTINUED | OUTPATIENT
Start: 2020-01-03 | End: 2020-01-03

## 2020-01-03 RX ORDER — ONDANSETRON 8 MG/1
8 TABLET, ORALLY DISINTEGRATING ORAL EVERY 8 HOURS PRN
Status: DISCONTINUED | OUTPATIENT
Start: 2020-01-03 | End: 2020-01-03

## 2020-01-03 RX ORDER — OXYTOCIN/RINGER'S LACTATE 30/500 ML
2 PLASTIC BAG, INJECTION (ML) INTRAVENOUS CONTINUOUS
Status: DISCONTINUED | OUTPATIENT
Start: 2020-01-03 | End: 2020-01-03

## 2020-01-03 RX ORDER — SODIUM CHLORIDE, SODIUM LACTATE, POTASSIUM CHLORIDE, CALCIUM CHLORIDE 600; 310; 30; 20 MG/100ML; MG/100ML; MG/100ML; MG/100ML
INJECTION, SOLUTION INTRAVENOUS CONTINUOUS
Status: DISCONTINUED | OUTPATIENT
Start: 2020-01-03 | End: 2020-01-03

## 2020-01-03 RX ORDER — OXYTOCIN/RINGER'S LACTATE 30/500 ML
95 PLASTIC BAG, INJECTION (ML) INTRAVENOUS ONCE
Status: DISCONTINUED | OUTPATIENT
Start: 2020-01-03 | End: 2020-01-04 | Stop reason: HOSPADM

## 2020-01-03 RX ORDER — DOCUSATE SODIUM 100 MG/1
200 CAPSULE, LIQUID FILLED ORAL 2 TIMES DAILY PRN
Status: DISCONTINUED | OUTPATIENT
Start: 2020-01-03 | End: 2020-01-04 | Stop reason: HOSPADM

## 2020-01-03 RX ORDER — FENTANYL/BUPIVACAINE/NS/PF 2MCG/ML-.1
PLASTIC BAG, INJECTION (ML) INJECTION
Status: DISPENSED
Start: 2020-01-03 | End: 2020-01-03

## 2020-01-03 RX ORDER — SODIUM CHLORIDE 9 MG/ML
INJECTION, SOLUTION INTRAVENOUS
Status: DISCONTINUED | OUTPATIENT
Start: 2020-01-03 | End: 2020-01-03

## 2020-01-03 RX ORDER — CALCIUM CARBONATE 200(500)MG
500 TABLET,CHEWABLE ORAL 3 TIMES DAILY PRN
Status: DISCONTINUED | OUTPATIENT
Start: 2020-01-03 | End: 2020-01-03

## 2020-01-03 RX ORDER — METHYLERGONOVINE MALEATE 0.2 MG/ML
INJECTION INTRAVENOUS
Status: DISCONTINUED
Start: 2020-01-03 | End: 2020-01-03 | Stop reason: WASHOUT

## 2020-01-03 RX ORDER — IBUPROFEN 600 MG/1
600 TABLET ORAL EVERY 6 HOURS PRN
Qty: 40 TABLET | Refills: 0 | Status: SHIPPED | OUTPATIENT
Start: 2020-01-03 | End: 2020-02-26

## 2020-01-03 RX ORDER — IBUPROFEN 600 MG/1
600 TABLET ORAL EVERY 6 HOURS PRN
Status: DISCONTINUED | OUTPATIENT
Start: 2020-01-03 | End: 2020-01-04 | Stop reason: HOSPADM

## 2020-01-03 RX ORDER — ACETAMINOPHEN 325 MG/1
650 TABLET ORAL EVERY 6 HOURS PRN
Status: DISCONTINUED | OUTPATIENT
Start: 2020-01-03 | End: 2020-01-04 | Stop reason: HOSPADM

## 2020-01-03 RX ORDER — OXYCODONE AND ACETAMINOPHEN 5; 325 MG/1; MG/1
1 TABLET ORAL EVERY 4 HOURS PRN
Qty: 10 TABLET | Refills: 0 | Status: SHIPPED | OUTPATIENT
Start: 2020-01-03 | End: 2020-01-09

## 2020-01-03 RX ORDER — DOCUSATE SODIUM 100 MG/1
200 CAPSULE, LIQUID FILLED ORAL 2 TIMES DAILY PRN
Qty: 30 CAPSULE | Refills: 0 | Status: SHIPPED | OUTPATIENT
Start: 2020-01-03 | End: 2020-02-26

## 2020-01-03 RX ORDER — DIPHENHYDRAMINE HYDROCHLORIDE 50 MG/ML
25 INJECTION INTRAMUSCULAR; INTRAVENOUS EVERY 4 HOURS PRN
Status: DISCONTINUED | OUTPATIENT
Start: 2020-01-03 | End: 2020-01-04 | Stop reason: HOSPADM

## 2020-01-03 RX ORDER — HYDROCORTISONE 25 MG/G
CREAM TOPICAL 3 TIMES DAILY PRN
Status: DISCONTINUED | OUTPATIENT
Start: 2020-01-03 | End: 2020-01-04 | Stop reason: HOSPADM

## 2020-01-03 RX ORDER — SODIUM CHLORIDE 9 MG/ML
INJECTION, SOLUTION INTRAVENOUS CONTINUOUS
Status: DISCONTINUED | OUTPATIENT
Start: 2020-01-03 | End: 2020-01-03

## 2020-01-03 RX ORDER — OXYCODONE AND ACETAMINOPHEN 10; 325 MG/1; MG/1
1 TABLET ORAL EVERY 4 HOURS PRN
Status: DISCONTINUED | OUTPATIENT
Start: 2020-01-03 | End: 2020-01-04 | Stop reason: HOSPADM

## 2020-01-03 RX ORDER — MISOPROSTOL 200 UG/1
TABLET ORAL
Status: DISCONTINUED
Start: 2020-01-03 | End: 2020-01-03 | Stop reason: WASHOUT

## 2020-01-03 RX ORDER — OXYTOCIN/RINGER'S LACTATE 30/500 ML
95 PLASTIC BAG, INJECTION (ML) INTRAVENOUS ONCE
Status: COMPLETED | OUTPATIENT
Start: 2020-01-03 | End: 2020-01-03

## 2020-01-03 RX ADMIN — SODIUM CHLORIDE, SODIUM LACTATE, POTASSIUM CHLORIDE, AND CALCIUM CHLORIDE 1000 ML: .6; .31; .03; .02 INJECTION, SOLUTION INTRAVENOUS at 01:01

## 2020-01-03 RX ADMIN — Medication 95 MILLI-UNITS/MIN: at 12:01

## 2020-01-03 RX ADMIN — SODIUM CHLORIDE, SODIUM LACTATE, POTASSIUM CHLORIDE, AND CALCIUM CHLORIDE: .6; .31; .03; .02 INJECTION, SOLUTION INTRAVENOUS at 12:01

## 2020-01-03 RX ADMIN — PRENATAL VIT W/ FE FUMARATE-FA TAB 27-0.8 MG 1 TABLET: 27-0.8 TAB at 02:01

## 2020-01-03 RX ADMIN — Medication 10 MILLI-UNITS/MIN: at 10:01

## 2020-01-03 RX ADMIN — Medication 6 MILLI-UNITS/MIN: at 09:01

## 2020-01-03 RX ADMIN — Medication 16 MILLI-UNITS/MIN: at 11:01

## 2020-01-03 RX ADMIN — Medication 334 MILLI-UNITS/MIN: at 12:01

## 2020-01-03 RX ADMIN — Medication 12 MILLI-UNITS/MIN: at 10:01

## 2020-01-03 RX ADMIN — Medication 8 MILLI-UNITS/MIN: at 10:01

## 2020-01-03 RX ADMIN — IBUPROFEN 600 MG: 600 TABLET, FILM COATED ORAL at 11:01

## 2020-01-03 RX ADMIN — Medication 2 MILLI-UNITS/MIN: at 05:01

## 2020-01-03 RX ADMIN — Medication 18 MILLI-UNITS/MIN: at 11:01

## 2020-01-03 RX ADMIN — IBUPROFEN 600 MG: 600 TABLET, FILM COATED ORAL at 06:01

## 2020-01-03 RX ADMIN — Medication 14 MILLI-UNITS/MIN: at 10:01

## 2020-01-03 NOTE — SUBJECTIVE & OBJECTIVE
Obstetric HPI:  Patient reports Intensity: moderate contractions, active fetal movement, No vaginal bleeding , Yes loss of fluid     This pregnancy has been complicated by nothing    OB History    Para Term  AB Living   2 1 1 0 0 1   SAB TAB Ectopic Multiple Live Births   0 0 0 0 1      # Outcome Date GA Lbr Duncan/2nd Weight Sex Delivery Anes PTL Lv   2 Current            1 Term 03/04/15   3.118 kg (6 lb 14 oz) F Vag-Spont EPI  EDMAR     Past Medical History:   Diagnosis Date    H/O elbow surgery     Oral contraceptive use      Past Surgical History:   Procedure Laterality Date    left elbow Left 2017    torn ligament         (Not in a hospital admission)    Review of patient's allergies indicates:   Allergen Reactions    No known drug allergies         Family History     Problem Relation (Age of Onset)    Breast cancer Other    Heart attack Maternal Grandfather    No Known Problems Maternal Grandmother, Sister, Paternal Grandmother, Paternal Grandfather    Skin cancer Other        Tobacco Use    Smoking status: Never Smoker    Smokeless tobacco: Never Used   Substance and Sexual Activity    Alcohol use: Yes    Drug use: No    Sexual activity: Yes     Partners: Male     Review of Systems   Constitutional: Negative for chills and fever.   Eyes: Negative for visual disturbance.   Respiratory: Negative for cough and shortness of breath.    Cardiovascular: Negative for chest pain and leg swelling.   Gastrointestinal: Negative for abdominal pain, nausea and vomiting.   Genitourinary: Positive for vaginal discharge. Negative for dysuria, flank pain, frequency, urgency and vaginal bleeding.   Integumentary:  Negative for breast mass.   Neurological: Negative for syncope and headaches.   Psychiatric/Behavioral: Negative for depression. The patient is not nervous/anxious.    All other systems reviewed and are negative.  Breast: Negative for mass and mastodynia     Objective:     Vital Signs (Most  Recent):    BP: (100)/(64) 100/64   Vital Signs (24h Range):  BP: (100)/(64) 100/64        There is no height or weight on file to calculate BMI.    FHT: 130 Cat 1 (reassuring)  TOCO: not tracing well    Physical Exam:   Constitutional: She is oriented to person, place, and time. She appears well-developed and well-nourished. No distress.    HENT:   Head: Normocephalic and atraumatic.      Cardiovascular: Normal rate, regular rhythm, normal heart sounds and intact distal pulses.     Pulmonary/Chest: Effort normal. No respiratory distress.        Abdominal: Soft. She exhibits no distension. There is no rebound and no guarding.     Genitourinary: No vaginal discharge found.           Musculoskeletal: Normal range of motion and moves all extremeties.       Neurological: She is alert and oriented to person, place, and time.    Skin: Skin is warm. She is not diaphoretic.    Psychiatric: She has a normal mood and affect. Her behavior is normal. Judgment and thought content normal.       Cervix:  Dilation:  3  Effacement:  75%  Station: -2  Presentation: Vertex     Significant Labs:  Lab Results   Component Value Date    GROUPTRH A POS 06/27/2019    HEPBSAG Negative 06/27/2019    STREPBCULT No Group B Streptococcus isolated 12/19/2019       I have personallly reviewed all pertinent lab results from the last 24 hours.

## 2020-01-03 NOTE — L&D DELIVERY NOTE
[facility]  Vaginal Delivery   Operative Note    SUMMARY     Normal spontaneous vaginal delivery of live infant, was placed on mothers abdomen for skin to skin and bulb suctioning performed.  Infant delivered position JESS with episiotomy done for fetal tachycardia.  Nuchal cord: No.    Spontaneous delivery of placenta and IV pitocin given noting good uterine tone.  Episiotomy repaired with 3-0 vicryl.  Patient tolerated delivery well. Sponge needle and lap counted correctly x2.    Indications: Vaginal delivery  Pregnancy complicated by:   Patient Active Problem List   Diagnosis    Vaginal delivery    Leakage of amniotic fluid     Admitting GA: 37w4d    Delivery Information for Parth Parekh    Birth information:  YOB: 2022   Time of birth: 12:04 PM   Sex: female   Head Delivery Date/Time: 1/3/2020 12:04 PM   Delivery type: Vaginal, Spontaneous   Gestational Age: 37w4d    Delivery Providers    Delivering clinician:  Enedina Chin MD   Provider Role    Catalina Higginbotham, RN             Measurements    Weight:    Length:           Apgars    Living status:    Apgars:   1 min.:   5 min.:   10 min.:   15 min.:   20 min.:     Skin color:          Heart rate:          Reflex irritability:          Muscle tone:          Respiratory effort:          Total:                 Operative Delivery    Forceps attempted?:  No  Vacuum extractor attempted?:  No         Shoulder Dystocia    Shoulder dystocia present?:  No           Presentation    Presentation:  Vertex  Position:  Middle           Interventions/Resuscitation         Cord    Vessels:  3 vessels  Complications:  None  Delayed Cord Clamping?:  Yes  Cord Clamped Date/Time:  1/3/2020 12:06 PM  Cord Blood Disposition:  Lab, Sent with Baby  Gases Sent?:  No  Stem Cell Collection (by MD):  No       Placenta    Placenta delivery date/time:    Placenta removal:             Labor Events:       labor: No     Labor Onset Date/Time:          Dilation Complete Date/Time:         Start Pushing Date/Time:         Start Pushing Date/Time:       Rupture Date/Time: 20  2100         Rupture type:           Fluid Amount:        Fluid Color: Clear      Fluid Odor:        Membrane Status: SRM (Spontaneous Rupture)               steroids:       Antibiotics given for GBS: No     Induction:       Indications for induction:        Augmentation:       Indications for augmentation:       Labor complications: None     Additional complications:          Cervical ripening:                     Delivery:      Episiotomy: Median     Indication for Episiotomy:       Perineal Lacerations:   Repaired:      Periurethral Laceration:   Repaired:     Labial Laceration:   Repaired:     Sulcus Laceration:   Repaired:     Vaginal Laceration:   Repaired:     Cervical Laceration:   Repaired:     Repair suture:       Repair # of packets:       Last Value - EBL - Nursing (mL):       Sum - EBL - Nursing (mL): 0     Last Value - EBL - Anesthesia (mL):      Calculated QBL (mL):        Vaginal Sweep Performed:       Surgicount Correct:         Other providers:       Anesthesia    Method:  Epidural          Details (if applicable):  Trial of Labor      Categorization:      Priority:     Indications for :     Incision Type:       Additional  information:  Forceps:    Vacuum:    Breech:    Observed anomalies    Other (Comments):

## 2020-01-03 NOTE — DISCHARGE INSTRUCTIONS
Breastfeeding Discharge Instructions       Feed the baby at the earliest sign of hunger or comfort  o Hands to mouth, sucking motions  o Rooting or searching for something to suck on  o Dont wait for crying - it is a sign of distress     The feedings may be 8-12 times per 24hrs and will not follow a schedule   Avoid pacifiers and bottles for the first 4 weeks   Alternate the breast you start the feeding with, or start with the breast that feels the fullest   Switch breasts when the baby takes himself off the breast or falls asleep   Keep offering breasts until the baby looks full, no longer gives hunger signs, and stays asleep when placed on his back in the crib   If the baby is sleepy and wont wake for a feeding, put the baby skin-to-skin dressed in a diaper against the mothers bare chest   Sleep near your baby   The baby should be positioned and latched on to the breast correctly  o Chest-to-chest, chin in the breast  o Babys lips are flipped outward  o Babys mouth is stretched open wide like a shout  o Babys sucking should feel like tugging to the mother  - The baby should be drinking at the breast:  o You should hear swallowing or gulping throughout the feeding  o You should see milk on the babys lips when he comes off the breast  o Your breasts should be softer when the baby is finished feeding  o The baby should look relaxed at the end of feedings  o After the 4th day and your milk is in:  o The babys poop should turn bright yellow and be loose, watery, and seedy  o The baby should have at least 3-4 poops the size of the palm of your hand per day  o The baby should have at least 5-6 wet diapers per day  o The urine should be light yellow in color  You should drink when you are thirsty and eat a healthy diet when you are    hungry.     Take naps to get the rest you need.   Take medications and/or drink alcohol only with permission of your obstetrician    or the babys pediatrician.  You can  also call the Infant Risk Center,   ([phone]), Monday-Friday, 8am-5pm Central time, to get the most   up-to-date evidence-based information on the use of medications during   pregnancy and breastfeeding.      The baby should be examined by a pediatrician at 3-5 days of age.  Once your   milk comes in, the baby should be gaining at least ½ - 1oz each day and should be back to birthweight no later than 10-14 days of age.          Community Resources    [facility] Breastfeeding Warmline: [phone]   Local [facility] clinics: provide incentives and breastpumps to eligible mothers  [employer] (LLLI):  mother-to-mother support group website        www.[employer]  Local La Leche League mother-to-mother support groups:        www.[employer]        La Leche League [facility]   Dr. Christos Prieto website for latch videos and general information:        www.breastfeedinginc.ca  [facility] Risk [facility] is a call center that provides information about the safety of taking medications while breastfeeding.  Call 1-919.332.2559, M-F, 8am-5pm, [place].  International Lactation Consultant Association provides resources for assistance:        www.ilca.org  [place] Breastfeeding Coalition provides informationand resources for parents  and the community    www.LaBreastfeedingSupport.org     Bernice Viera is a mom-to-mom support group:                             www.BlueSnapmarisaLeatt.com//breastfeedng-support/  [employer] for Healthy Babies:  9-442-349-BABY(1544)  [employer]: a breastfeeding support group for women of color, [phone]

## 2020-01-03 NOTE — HOSPITAL COURSE
Patient progressed to  without complication.  PPD1: Melinda feeling well, would like d/c home today pending ped's clearance

## 2020-01-03 NOTE — H&P
[facility] [place]  Obstetrics  History & Physical    Patient Name: Melinda Parekh  MRN: [id number]  Admission Date: 2020  Primary Care Provider: Primary Doctor No    Subjective:     Principal Problem:Normal labor    History of Present Illness:  29 y.o.  @ 37w3d p/w ROM-clear at 9pm and contractions    Obstetric HPI:  Patient reports Intensity: moderate contractions, active fetal movement, No vaginal bleeding , Yes loss of fluid     This pregnancy has been complicated by nothing    OB History    Para Term  AB Living   2 1 1 0 0 1   SAB TAB Ectopic Multiple Live Births   0 0 0 0 1      # Outcome Date GA Lbr Duncan/2nd Weight Sex Delivery Anes PTL Lv   2 Current            1 Term 03/04/15   3.118 kg (6 lb 14 oz) F Vag-Spont EPI  EDMAR     Past Medical History:   Diagnosis Date    H/O elbow surgery     Oral contraceptive use      Past Surgical History:   Procedure Laterality Date    left elbow Left 2017    torn ligament         (Not in a hospital admission)    Review of patient's allergies indicates:   Allergen Reactions    No known drug allergies         Family History     Problem Relation (Age of Onset)    Breast cancer Other    Heart attack Maternal Grandfather    No Known Problems Maternal Grandmother, Sister, Paternal Grandmother, Paternal Grandfather    Skin cancer Other        Tobacco Use    Smoking status: Never Smoker    Smokeless tobacco: Never Used   Substance and Sexual Activity    Alcohol use: Yes    Drug use: No    Sexual activity: Yes     Partners: Male     Review of Systems   Constitutional: Negative for chills and fever.   Eyes: Negative for visual disturbance.   Respiratory: Negative for cough and shortness of breath.    Cardiovascular: Negative for chest pain and leg swelling.   Gastrointestinal: Negative for abdominal pain, nausea and vomiting.   Genitourinary: Positive for vaginal discharge. Negative for dysuria, flank pain, frequency, urgency and vaginal  bleeding.   Integumentary:  Negative for breast mass.   Neurological: Negative for syncope and headaches.   Psychiatric/Behavioral: Negative for depression. The patient is not nervous/anxious.    All other systems reviewed and are negative.  Breast: Negative for mass and mastodynia     Objective:     Vital Signs (Most Recent):    BP: (100)/(64) 100/64   Vital Signs (24h Range):  BP: (100)/(64) 100/64        There is no height or weight on file to calculate BMI.    FHT: 130 Cat 1 (reassuring)  TOCO: q4 min    Physical Exam:   Constitutional: She is oriented to person, place, and time. She appears well-developed and well-nourished. No distress.    HENT:   Head: Normocephalic and atraumatic.      Cardiovascular: Normal rate, regular rhythm, normal heart sounds and intact distal pulses.     Pulmonary/Chest: Effort normal. No respiratory distress.        Abdominal: Soft. She exhibits no distension. There is no rebound and no guarding.     Genitourinary: No vaginal discharge found.           Musculoskeletal: Normal range of motion and moves all extremeties.       Neurological: She is alert and oriented to person, place, and time.    Skin: Skin is warm. She is not diaphoretic.    Psychiatric: She has a normal mood and affect. Her behavior is normal. Judgment and thought content normal.       Cervix:  Dilation:  3  Effacement:  75%  Station: -2  Presentation: Vertex     Significant Labs:  Lab Results   Component Value Date    GROUPTRH A POS 2019    HEPBSAG Negative 2019    STREPBCULT No Group B Streptococcus isolated 2019       I have personallly reviewed all pertinent lab results from the last 24 hours.    Assessment/Plan:     29 y.o. female  at 37w3d for:    * Normal labor  Admit to L&D  Consents signed  Cephalic by palpation  GBS status: negative  Plan for: expectant management, pitocin if stalls      Apple Palma MD  Obstetrics  [facility]

## 2020-01-03 NOTE — ED NOTES
Pt presents to [facility] with c/o LOF at 2100. Pt reports +FM and ctx. Pt denies vaginal bleeding. Pt placed in assessment room 2.  notified.

## 2020-01-03 NOTE — PROGRESS NOTES
Labor Progress Note        Subjective:      Patient currently complaining of contraction pain.     Objective:      Temp:  [98.1 °F (36.7 °C)-98.3 °F (36.8 °C)] 98.3 °F (36.8 °C)  Pulse:  [61-90] 68  Resp:  [16-18] 16  SpO2:  [97 %-100 %] 99 %  BP: (100-134)/(60-77) 120/76  Body mass index is 25.86 kg/m².     General: no acute distress  Electronic Fetal Monitoring:  FHT: 120 bpm, moderate variability, accelerations present, decelerations absent   Category: 1                 TOCO: Contractions: regular, every 2-3 minutes   Cervix:6/100/0   Assessment:     1. IUP at  here for SROM at term     Plan:     1. Continue active management of labor. Pitocin at 8 mU.  2. Reassuring FHT  3. Epidural yes.   4. Membranes ruptured yes.   5. Recheck in 2 hours or prn.

## 2020-01-03 NOTE — PROGRESS NOTES
Labor Progress Note        Subjective:      Patient currently doing well without complaints.     Objective:      Temp:  [98.1 °F (36.7 °C)-98.2 °F (36.8 °C)] 98.1 °F (36.7 °C)  Pulse:  [61-90] 75  Resp:  [18] 18  SpO2:  [97 %-100 %] 100 %  BP: (100-134)/(64-77) 116/73  Body mass index is 25.86 kg/m².     General: no acute distress  Electronic Fetal Monitoring:  FHT: Category: 1                 TOCO: Contractions: q2-5     Assessment:     1. IUP at  here for spontaneous labor     Plan:     1. Discussed continued expectant management vs starting pitocin. Will augment with pitocin - ordered  2. Reassuring FHT  3. Epidural yes.   4. Membranes ruptured yes.   5. Cervix:4/80/-2   6. Recheck in 2-4 hours or prn.

## 2020-01-03 NOTE — ASSESSMENT & PLAN NOTE
Admit to L&D  Consents signed  Cephalic by palpation  GBS status: negative  Plan for: expectant management, pitocin if stalls

## 2020-01-03 NOTE — ED PROVIDER NOTES
Encounter Date: 2020       History     Chief Complaint   Patient presents with    Rupture of Membranes     Melinda Parekh is a 29 y.o. [id number] at 37w3d presents complaining of ROM and increase in contraction intensity. ROM occurred at approximately 2100 and was clear. She has had continued leakage since. She denies any HA, vision changes, RUQ pain, SOB.  This IUP is complicated by no issues.  Patient denies vaginal bleeding.   Fetal Movement: normal.         Review of patient's allergies indicates:   Allergen Reactions    No known drug allergies      Past Medical History:   Diagnosis Date    H/O elbow surgery     Oral contraceptive use      Past Surgical History:   Procedure Laterality Date    left elbow Left 2017    torn ligament     Family History   Problem Relation Age of Onset    No Known Problems Maternal Grandmother     No Known Problems Sister     Heart attack Maternal Grandfather     No Known Problems Paternal Grandmother     No Known Problems Paternal Grandfather     Skin cancer Other     Breast cancer Other     Colon cancer Neg Hx     Ovarian cancer Neg Hx      Social History     Tobacco Use    Smoking status: Never Smoker    Smokeless tobacco: Never Used   Substance Use Topics    Alcohol use: Yes    Drug use: No     Review of Systems   Constitutional: Negative for chills and fever.   HENT: Negative for sore throat.    Eyes: Negative for visual disturbance.   Respiratory: Negative for shortness of breath.    Cardiovascular: Negative for chest pain and palpitations.   Gastrointestinal: Negative for diarrhea.   Genitourinary: Positive for pelvic pain and vaginal discharge. Negative for difficulty urinating and vaginal pain.   Musculoskeletal: Negative for myalgias.   Skin: Negative for color change.   Neurological: Negative for dizziness, light-headedness and headaches.   Psychiatric/Behavioral: Negative for confusion.   All other systems reviewed and are negative.      Physical  Exam     Initial Vitals   BP Pulse Resp Temp SpO2   01/02/20 2318 01/02/20 2318 -- 01/02/20 2319 01/02/20 2324   122/74 78  98.2 °F (36.8 °C) 97 %      MAP       --                Physical Exam    Constitutional: She appears well-developed and well-nourished. No distress.   HENT:   Head: Normocephalic and atraumatic.   Eyes: EOM are normal.   Neck: Normal range of motion.   Cardiovascular: Normal rate.   Pulmonary/Chest: She has no wheezes.   Abdominal: Soft. There is no tenderness.   Musculoskeletal: Normal range of motion.   Neurological: She is alert and oriented to person, place, and time.   Skin: Skin is warm, dry and intact. No rash noted.   Psychiatric: She has a normal mood and affect. Her speech is normal and behavior is normal.     OB LABOR EXAM:     Membranes ruptured: Yes.   Method: Sterile vaginal exam per MD and Sterile speculum exam per MD.   Vaginal Bleeding: none present.     Dilatation: 3.   Station: -2.   Effacement: 70%.   Amniotic Fluid Color: normal.   Amniotic Fluid Amount: copious.         ED Course   Obtain Fetal nonstress test (NST)  Date/Time: 1/2/2020 11:49 PM  Performed by: Yanick Garcia MD  Authorized by: Yanick Garcia MD     Nonstress Test:     Variability:  6-25 BPM    Decelerations:  None    Accelerations:  15 bpm    Baseline:  150    Contractions:  Irregular    Contraction Frequency:  5-10  Biophysical Profile:     Nonstress Test Interpretation: reactive      Overall Impression:  Reassuring      Labs Reviewed          Imaging Results    None          Medical Decision Making:   ED Management:  - VSS and wnl  - Grossly ROM; clr  - 3/70/-2  - Vertex on US  - Consents signed  - Will admit to L&D  - Discussed with staff                                 Clinical Impression:       ICD-10-CM ICD-9-CM   1. Leakage of amniotic fluid O42.90 658.10   2. Normal labor O80 650    Z37.9          Disposition:   Disposition: Admitted  Condition: Stable      SONA Garcia MD  OBGYN PGY1                 Yanick Garcia MD  Resident  01/03/20 0011

## 2020-01-04 VITALS
BODY MASS INDEX: 25.87 KG/M2 | HEIGHT: 63 IN | WEIGHT: 146 LBS | SYSTOLIC BLOOD PRESSURE: 119 MMHG | TEMPERATURE: 98 F | RESPIRATION RATE: 18 BRPM | OXYGEN SATURATION: 97 % | HEART RATE: 62 BPM | DIASTOLIC BLOOD PRESSURE: 73 MMHG

## 2020-01-04 PROBLEM — O42.90 LEAKAGE OF AMNIOTIC FLUID: Status: RESOLVED | Noted: 2020-01-03 | Resolved: 2020-01-04

## 2020-01-04 LAB
BASOPHILS # BLD AUTO: 0.02 K/UL (ref 0–0.2)
BASOPHILS NFR BLD: 0.2 % (ref 0–1.9)
DIFFERENTIAL METHOD: ABNORMAL
EOSINOPHIL # BLD AUTO: 0.2 K/UL (ref 0–0.5)
EOSINOPHIL NFR BLD: 1.5 % (ref 0–8)
ERYTHROCYTE [DISTWIDTH] IN BLOOD BY AUTOMATED COUNT: 12.8 % (ref 11.5–14.5)
HCT VFR BLD AUTO: 33.9 % (ref 37–48.5)
HGB BLD-MCNC: 11.3 G/DL (ref 12–16)
IMM GRANULOCYTES # BLD AUTO: 0.08 K/UL (ref 0–0.04)
IMM GRANULOCYTES NFR BLD AUTO: 0.7 % (ref 0–0.5)
LYMPHOCYTES # BLD AUTO: 2 K/UL (ref 1–4.8)
LYMPHOCYTES NFR BLD: 17.1 % (ref 18–48)
MCH RBC QN AUTO: 29.7 PG (ref 27–31)
MCHC RBC AUTO-ENTMCNC: 33.3 G/DL (ref 32–36)
MCV RBC AUTO: 89 FL (ref 82–98)
MONOCYTES # BLD AUTO: 0.8 K/UL (ref 0.3–1)
MONOCYTES NFR BLD: 6.8 % (ref 4–15)
NEUTROPHILS # BLD AUTO: 8.4 K/UL (ref 1.8–7.7)
NEUTROPHILS NFR BLD: 73.7 % (ref 38–73)
NRBC BLD-RTO: 0 /100 WBC
PLATELET # BLD AUTO: 151 K/UL (ref 150–350)
PMV BLD AUTO: 10.6 FL (ref 9.2–12.9)
RBC # BLD AUTO: 3.81 M/UL (ref 4–5.4)
WBC # BLD AUTO: 11.38 K/UL (ref 3.9–12.7)

## 2020-01-04 PROCEDURE — 99024 PR POST-OP FOLLOW-UP VISIT: ICD-10-PCS | Mod: ,,, | Performed by: OBSTETRICS & GYNECOLOGY

## 2020-01-04 PROCEDURE — 25000003 PHARM REV CODE 250: Performed by: OBSTETRICS & GYNECOLOGY

## 2020-01-04 PROCEDURE — 99024 POSTOP FOLLOW-UP VISIT: CPT | Mod: ,,, | Performed by: OBSTETRICS & GYNECOLOGY

## 2020-01-04 PROCEDURE — 36415 COLL VENOUS BLD VENIPUNCTURE: CPT

## 2020-01-04 PROCEDURE — 85025 COMPLETE CBC W/AUTO DIFF WBC: CPT

## 2020-01-04 RX ADMIN — PRENATAL VIT W/ FE FUMARATE-FA TAB 27-0.8 MG 1 TABLET: 27-0.8 TAB at 12:01

## 2020-01-04 RX ADMIN — IBUPROFEN 600 MG: 600 TABLET, FILM COATED ORAL at 06:01

## 2020-01-04 RX ADMIN — IBUPROFEN 600 MG: 600 TABLET, FILM COATED ORAL at 12:01

## 2020-01-04 NOTE — NURSING
Discussed the desired feeding choice with the patient.  Reviewed the benefits of breastfeeding and the risks of formula feeding. States understanding of all information and verbalized appropriate recall.   Instructed on the risks of early pacifier or artificial nipple use, including:   Decreased milk supply due to decreased breast stimulation from delayed or skipped feedings with pacifier use   Nipple confusion due to the promotion of non-nutritive sucking on the pacifier/nipple   Skipped feedings the increases chance of engorgement, mastitis and plugged ducts   Decreases the duration of exclusive breastfeeding  States understanding and verbalized appropriate recall.

## 2020-01-04 NOTE — DISCHARGE SUMMARY
[facility]  Obstetrics  Discharge Summary      Patient Name: Melinda Parekh  MRN: [id number]  Admission Date: 2020  Hospital Length of Stay: 2 days  Discharge Date and Time:  2020 9:00 AM  Attending Physician: Pili Smith MD   Discharging Provider: Soraya Gray DO   Primary Care Provider: Primary Doctor No    HPI: 29 y.o.  @ 37w3d p/w ROM-clear at 9pm and contractions        * No surgery found *     Hospital Course:   Patient progressed to  without complication.  PPD1: Melinda feeling well, would like d/c home today pending ped's clearance         Final Active Diagnoses:    Diagnosis Date Noted POA    PRINCIPAL PROBLEM:  Vaginal delivery [O80] 2020 Not Applicable      Problems Resolved During this Admission:    Diagnosis Date Noted Date Resolved POA    Leakage of amniotic fluid [O42.90] 2020 Yes    Leakage of amniotic fluid [O42.90] 2020 Yes    Pregnancy [Z34.90] 2020 Not Applicable    Normal labor [O80, Z37.9] 2020 Not Applicable          Feeding Method: breast    Immunizations     Date Immunization Status Dose Route/Site Given by    20 1335 MMR Incomplete 0.5 mL Subcutaneous/Left deltoid     20 1335 Tdap Incomplete 0.5 mL Intramuscular/Left deltoid           Delivery:    Episiotomy: Median   Lacerations: None   Repair suture:     Repair # of packets: 1   Blood loss (ml): 120     Birth information:  YOB: 2022   Time of birth: 12:04 PM   Sex: female   Delivery type: Vaginal, Spontaneous   Gestational Age: 37w4d    Delivery Clinician:      Other providers:       Additional  information:  Forceps:    Vacuum:    Breech:    Observed anomalies      Living?:           APGARS  One minute Five minutes Ten minutes   Skin color:         Heart rate:         Grimace:         Muscle tone:         Breathing:         Totals: 9  9        Placenta: Delivered:       appearance    Pending  Diagnostic Studies:     None          Discharged Condition: good    Disposition: Home or Self Care    Follow Up:  Follow-up Information     Pili Smith MD In 6 weeks.    Specialties:  Obstetrics, Gynecology, Obstetrics and Gynecology  Why:  For post-partum check  Contact information:  [address]  [address]  [place] [address]  [phone]                 Patient Instructions:      Call MD for:  temperature >100.4     Call MD for:  persistent nausea and vomiting or diarrhea     Call MD for:  severe uncontrolled pain     Call MD for:  redness, tenderness, or signs of infection (pain, swelling, redness, odor or green/yellow discharge around incision site)     No dressing needed     Pelvic Rest     No driving until:   Order Comments: Not taking percocet     Activity as tolerated     Shower on day dressing removed (No bath)     Medications:  Current Discharge Medication List      START taking these medications    Details   docusate sodium (COLACE) 100 MG capsule Take 2 capsules (200 mg total) by mouth 2 (two) times daily as needed for Constipation.  Qty: 30 capsule, Refills: 0      ibuprofen (ADVIL,MOTRIN) 600 MG tablet Take 1 tablet (600 mg total) by mouth every 6 (six) hours as needed (cramping).  Qty: 40 tablet, Refills: 0      oxyCODONE-acetaminophen (PERCOCET) 5-325 mg per tablet Take 1 tablet by mouth every 4 (four) hours as needed.  Qty: 10 tablet, Refills: 0    Comments: Quantity prescribed more than 7 day supply? No         CONTINUE these medications which have NOT CHANGED    Details   prenatal 25/iron fum/folic/dha (PRENATAL-1 ORAL) Take by mouth.         STOP taking these medications       GINGER ROOT, BULK, MISC Comments:   Reason for Stopping:               Soraya Gray DO  Obstetrics  [facility]

## 2020-01-04 NOTE — LACTATION NOTE
Patient states she is planning for discharge later today if the baby's labs are ok. States baby is breastfeeding well but she supplemented the baby with formula for one feeding via spoon because she didn't think the baby got enough by breastfeeding. Discussed risks of formula use and encouraged to nurse the baby 8 or more times in 24 hours and avoid artificial nipples and formula. Discharge instructions given. LC number written on board. Encouraged to call at next feeding for latch assessment/assistance.

## 2020-01-04 NOTE — SUBJECTIVE & OBJECTIVE
Hospital course: Patient progressed to  without complication.  PPD1: Melinda feeling well, would like d/c home today pending ped's clearance    She is doing well this morning. She is tolerating a regular diet without nausea or vomiting. She is voiding spontaneously. She is ambulating. She has passed flatus, and has a BM. Vaginal bleeding is mild. She denies fever or chills. Abdominal pain is mild and controlled with oral medications. She is breastfeeding.    Objective:     Vital Signs (Most Recent):  Temp: 97.8 °F (36.6 °C) (20)  Pulse: 66 (20)  Resp: 17 (20)  BP: 119/73 (20)  SpO2: 99 % (20) Vital Signs (24h Range):  Temp:  [97.8 °F (36.6 °C)-98.1 °F (36.7 °C)] 97.8 °F (36.6 °C)  Pulse:  [58-86] 66  Resp:  [17-18] 17  SpO2:  [97 %-99 %] 99 %  BP: (103-133)/(56-84) 119/73     Weight: 66.2 kg (146 lb)  Body mass index is 25.86 kg/m².      Intake/Output Summary (Last 24 hours) at 2020 0857  Last data filed at 1/3/2020 1204  Gross per 24 hour   Intake --   Output 345 ml   Net -345 ml       Significant Labs:  Lab Results   Component Value Date    GROUPTRH A POS 2020    HEPBSAG Negative 2019    STREPBCULT No Group B Streptococcus isolated 2019     Recent Labs   Lab 20  0434   HGB 11.3*   HCT 33.9*       I have personallly reviewed all pertinent lab results from the last 24 hours.    Physical Exam:   Constitutional: She is oriented to person, place, and time. She appears well-developed and well-nourished. No distress.    HENT:   Head: Normocephalic and atraumatic.       Pulmonary/Chest: Effort normal. No respiratory distress.        Abdominal: Soft. She exhibits no distension. There is no tenderness. There is no rebound and no guarding.   Fundus firm, NT, below umbilicus                   Neurological: She is alert and oriented to person, place, and time.    Skin: Skin is warm and dry. She is not diaphoretic.    Psychiatric: She has a  normal mood and affect.

## 2020-01-04 NOTE — PLAN OF CARE
Pt to follow basic breastfeeding education and skin to skin frequently.  Pt to hand express after each feed and provide to baby by spoon.

## 2020-01-04 NOTE — LACTATION NOTE
Lactation basics education completed. LC reviewed Breastfeeding Guide and encouraged tracking feeds and output. Encouraged use of STS, frequent feeds based on baby's cues, and avoiding artificial nipples. Pt agreeable to hand expressing after nursing and providing by spoon. Pt verbalized understanding and questions answered. Pt aware to call LC for assistance with feeding.

## 2020-01-04 NOTE — PROGRESS NOTES
[facility]  Obstetrics  Postpartum Progress Note    Patient Name: Melinda Parekh  MRN: [id number]  Admission Date: 2020  Hospital Length of Stay: 2 days  Attending Physician: Pili Smith MD  Primary Care Provider: Primary Doctor No    Subjective:     Principal Problem:Vaginal delivery    Hospital course: Patient progressed to  without complication.  PPD1: Melinda feeling well, would like d/c home today pending ped's clearance    She is doing well this morning. She is tolerating a regular diet without nausea or vomiting. She is voiding spontaneously. She is ambulating. She has passed flatus, and has a BM. Vaginal bleeding is mild. She denies fever or chills. Abdominal pain is mild and controlled with oral medications. She is breastfeeding.    Objective:     Vital Signs (Most Recent):  Temp: 97.8 °F (36.6 °C) (20)  Pulse: 66 (20)  Resp: 17 (20)  BP: 119/73 (20)  SpO2: 99 % (20) Vital Signs (24h Range):  Temp:  [97.8 °F (36.6 °C)-98.1 °F (36.7 °C)] 97.8 °F (36.6 °C)  Pulse:  [58-86] 66  Resp:  [17-18] 17  SpO2:  [97 %-99 %] 99 %  BP: (103-133)/(56-84) 119/73     Weight: 66.2 kg (146 lb)  Body mass index is 25.86 kg/m².      Intake/Output Summary (Last 24 hours) at 2020 0857  Last data filed at 1/3/2020 1204  Gross per 24 hour   Intake --   Output 345 ml   Net -345 ml       Significant Labs:  Lab Results   Component Value Date    GROUPTRH A POS 2020    HEPBSAG Negative 2019    STREPBCULT No Group B Streptococcus isolated 2019     Recent Labs   Lab 20  0434   HGB 11.3*   HCT 33.9*       I have personallly reviewed all pertinent lab results from the last 24 hours.    Physical Exam:   Constitutional: She is oriented to person, place, and time. She appears well-developed and well-nourished. No distress.    HENT:   Head: Normocephalic and atraumatic.       Pulmonary/Chest: Effort normal. No respiratory distress.         Abdominal: Soft. She exhibits no distension. There is no tenderness. There is no rebound and no guarding.   Fundus firm, NT, below umbilicus                   Neurological: She is alert and oriented to person, place, and time.    Skin: Skin is warm and dry. She is not diaphoretic.    Psychiatric: She has a normal mood and affect.       Assessment/Plan:     29 y.o. female  for:    * Vaginal delivery  Good pp condition, stable for d/c home        Disposition: As patient meets milestones, will plan to discharge today.    Soraya Gray, DO  Obstetrics  [facility]

## 2020-01-05 NOTE — PLAN OF CARE
Pt in stable condition. Assessment WNL, VSS. Pain well controlled with ibuprofen. Discharge order obtained and verified. Paperwork provided, prescriptions delivered. Pt aware of need for follow up appointments and s/s to report to . All questions addressed, pt escorted off unit with baby

## 2020-01-09 ENCOUNTER — POSTPARTUM VISIT (OUTPATIENT)
Dept: OBSTETRICS AND GYNECOLOGY | Facility: CLINIC | Age: 30
End: 2020-01-09
Attending: STUDENT IN AN ORGANIZED HEALTH CARE EDUCATION/TRAINING PROGRAM
Payer: COMMERCIAL

## 2020-01-09 ENCOUNTER — TELEPHONE (OUTPATIENT)
Dept: OBSTETRICS AND GYNECOLOGY | Facility: CLINIC | Age: 30
End: 2020-01-09

## 2020-01-09 VITALS
WEIGHT: 136.56 LBS | BODY MASS INDEX: 24.2 KG/M2 | SYSTOLIC BLOOD PRESSURE: 114 MMHG | DIASTOLIC BLOOD PRESSURE: 74 MMHG | HEIGHT: 63 IN

## 2020-01-09 DIAGNOSIS — B99.9 INFECTION: Primary | ICD-10-CM

## 2020-01-09 PROCEDURE — 3008F PR BODY MASS INDEX (BMI) DOCUMENTED: ICD-10-PCS | Mod: CPTII,S$GLB,, | Performed by: STUDENT IN AN ORGANIZED HEALTH CARE EDUCATION/TRAINING PROGRAM

## 2020-01-09 PROCEDURE — 3008F BODY MASS INDEX DOCD: CPT | Mod: CPTII,S$GLB,, | Performed by: STUDENT IN AN ORGANIZED HEALTH CARE EDUCATION/TRAINING PROGRAM

## 2020-01-09 PROCEDURE — 0503F PR POSTPARTUM CARE VISIT: ICD-10-PCS | Mod: S$GLB,,, | Performed by: STUDENT IN AN ORGANIZED HEALTH CARE EDUCATION/TRAINING PROGRAM

## 2020-01-09 PROCEDURE — 0503F POSTPARTUM CARE VISIT: CPT | Mod: S$GLB,,, | Performed by: STUDENT IN AN ORGANIZED HEALTH CARE EDUCATION/TRAINING PROGRAM

## 2020-01-09 PROCEDURE — 87076 CULTURE ANAEROBE IDENT EACH: CPT

## 2020-01-09 PROCEDURE — 99999 PR PBB SHADOW E&M-EST. PATIENT-LVL III: ICD-10-PCS | Mod: PBBFAC,,, | Performed by: STUDENT IN AN ORGANIZED HEALTH CARE EDUCATION/TRAINING PROGRAM

## 2020-01-09 PROCEDURE — 87075 CULTR BACTERIA EXCEPT BLOOD: CPT

## 2020-01-09 PROCEDURE — 99999 PR PBB SHADOW E&M-EST. PATIENT-LVL III: CPT | Mod: PBBFAC,,, | Performed by: STUDENT IN AN ORGANIZED HEALTH CARE EDUCATION/TRAINING PROGRAM

## 2020-01-09 PROCEDURE — 87070 CULTURE OTHR SPECIMN AEROBIC: CPT

## 2020-01-09 RX ORDER — SULFAMETHOXAZOLE AND TRIMETHOPRIM 800; 160 MG/1; MG/1
1 TABLET ORAL 2 TIMES DAILY
Qty: 14 TABLET | Refills: 0 | Status: SHIPPED | OUTPATIENT
Start: 2020-01-09 | End: 2020-01-09

## 2020-01-09 RX ORDER — METRONIDAZOLE 500 MG/1
500 TABLET ORAL 2 TIMES DAILY
Qty: 14 TABLET | Refills: 0 | Status: SHIPPED | OUTPATIENT
Start: 2020-01-09 | End: 2020-01-16 | Stop reason: SDUPTHER

## 2020-01-09 RX ORDER — AMOXICILLIN AND CLAVULANATE POTASSIUM 875; 125 MG/1; MG/1
1 TABLET, FILM COATED ORAL EVERY 12 HOURS
Qty: 14 TABLET | Refills: 0 | Status: SHIPPED | OUTPATIENT
Start: 2020-01-09 | End: 2020-01-16 | Stop reason: SDUPTHER

## 2020-01-09 RX ORDER — AMOXICILLIN AND CLAVULANATE POTASSIUM 875; 125 MG/1; MG/1
1 TABLET, FILM COATED ORAL EVERY 12 HOURS
Qty: 14 TABLET | Refills: 0 | Status: SHIPPED | OUTPATIENT
Start: 2020-01-09 | End: 2020-01-09

## 2020-01-09 NOTE — PROGRESS NOTES
"  Subjective:      Melinda Parekh is a 29 y.o.  who presents for a postpartum visit.  She is status post  uncomplicated vaginal delivery 1 weeks ago - had episiotomy that was repaired without difficulty.  Her hospitalization was not complicated.  She is breastfeeding.  She desires oral progesterone-only contraceptive for contraception.  She reports mood is stable.    Reports she was at home and felt a suture vaginally and then noticed things were "open" near the vagina.  Lochia minimal.  No fevers or chills.        Past Medical History:   Diagnosis Date    H/O elbow surgery     Oral contraceptive use        Current Outpatient Medications:     docusate sodium (COLACE) 100 MG capsule, Take 2 capsules (200 mg total) by mouth 2 (two) times daily as needed for Constipation., Disp: 30 capsule, Rfl: 0    ibuprofen (ADVIL,MOTRIN) 600 MG tablet, Take 1 tablet (600 mg total) by mouth every 6 (six) hours as needed (cramping)., Disp: 40 tablet, Rfl: 0    prenatal 25/iron fum/folic/dha (PRENATAL-1 ORAL), Take by mouth., Disp: , Rfl:     amoxicillin-clavulanate 875-125mg (AUGMENTIN) 875-125 mg per tablet, Take 1 tablet by mouth every 12 (twelve) hours. for 7 days, Disp: 14 tablet, Rfl: 0    metroNIDAZOLE (FLAGYL) 500 MG tablet, Take 1 tablet (500 mg total) by mouth 2 (two) times daily. Do not drink alcohol while taking this medication. for 7 days, Disp: 14 tablet, Rfl: 0      Objective:     Vitals:    20 1421   BP: 114/74       APPEARANCE: Well nourished, well developed, in no acute distress.  PSYCH: Appropriate mood and affect.  NECK:  Supple, no thyromegaly.  ABDOMEN:  Soft, nontender.  GENITOURINARY:  Perineum with superficial dehiscence, well approximated.  Granulation tissue and erythema present.    EXTREMITIES: No edema.      Assessment:     Infection  -     Aerobic culture  -     Culture, Anaerobic    Other orders  -     Discontinue: amoxicillin-clavulanate 875-125mg (AUGMENTIN) 875-125 mg per tablet; " Take 1 tablet by mouth every 12 (twelve) hours. for 7 days  Dispense: 14 tablet; Refill: 0  -     metroNIDAZOLE (FLAGYL) 500 MG tablet; Take 1 tablet (500 mg total) by mouth 2 (two) times daily. Do not drink alcohol while taking this medication. for 7 days  Dispense: 14 tablet; Refill: 0  -     Discontinue: sulfamethoxazole-trimethoprim 800-160mg (BACTRIM DS) 800-160 mg Tab; Take 1 tablet by mouth 2 (two) times daily. for 7 days  Dispense: 14 tablet; Refill: 0  -     amoxicillin-clavulanate 875-125mg (AUGMENTIN) 875-125 mg per tablet; Take 1 tablet by mouth every 12 (twelve) hours. for 7 days  Dispense: 14 tablet; Refill: 0        Plan:     1. Postpartum course reviewed and discussed with patient.  Superficial dehiscence present on exam.  Discussed with patient and cultures collected.  Reviewed sitz baths and perineal care.  Will start antibiotics.  All questions answered.  Return to clinic in 1 week for exam.

## 2020-01-09 NOTE — TELEPHONE ENCOUNTER
"Pt states that she delivered on Davin. 3 and is "pretty sure" one of her episiotomy stiches popped. Pt would like to come in today to be seen at [facility]. Pt's #  [phone]  "

## 2020-01-09 NOTE — Clinical Note
Had a superficial dehiscence of episiotomy - well approximated.  Did cultured and started antibiotics.  I'm seeing next week and will keep you posted.

## 2020-01-09 NOTE — TELEPHONE ENCOUNTER
Pt states she noticed a stitch hanging and when she looked the incision is open.  Scheduled with Dr. Mclean today.

## 2020-01-11 LAB — BACTERIA SPEC AEROBE CULT: NORMAL

## 2020-01-13 LAB — BACTERIA SPEC ANAEROBE CULT: ABNORMAL

## 2020-01-14 ENCOUNTER — PATIENT MESSAGE (OUTPATIENT)
Dept: OBSTETRICS AND GYNECOLOGY | Facility: CLINIC | Age: 30
End: 2020-01-14

## 2020-01-16 ENCOUNTER — POSTPARTUM VISIT (OUTPATIENT)
Dept: OBSTETRICS AND GYNECOLOGY | Facility: CLINIC | Age: 30
End: 2020-01-16
Attending: STUDENT IN AN ORGANIZED HEALTH CARE EDUCATION/TRAINING PROGRAM
Payer: COMMERCIAL

## 2020-01-16 VITALS
DIASTOLIC BLOOD PRESSURE: 70 MMHG | BODY MASS INDEX: 23.5 KG/M2 | WEIGHT: 132.63 LBS | SYSTOLIC BLOOD PRESSURE: 110 MMHG | HEIGHT: 63 IN

## 2020-01-16 PROCEDURE — 99999 PR PBB SHADOW E&M-EST. PATIENT-LVL III: CPT | Mod: PBBFAC,,, | Performed by: STUDENT IN AN ORGANIZED HEALTH CARE EDUCATION/TRAINING PROGRAM

## 2020-01-16 PROCEDURE — 99999 PR PBB SHADOW E&M-EST. PATIENT-LVL III: ICD-10-PCS | Mod: PBBFAC,,, | Performed by: STUDENT IN AN ORGANIZED HEALTH CARE EDUCATION/TRAINING PROGRAM

## 2020-01-16 PROCEDURE — 0503F PR POSTPARTUM CARE VISIT: ICD-10-PCS | Mod: S$GLB,,, | Performed by: STUDENT IN AN ORGANIZED HEALTH CARE EDUCATION/TRAINING PROGRAM

## 2020-01-16 PROCEDURE — 0503F POSTPARTUM CARE VISIT: CPT | Mod: S$GLB,,, | Performed by: STUDENT IN AN ORGANIZED HEALTH CARE EDUCATION/TRAINING PROGRAM

## 2020-01-16 RX ORDER — METRONIDAZOLE 500 MG/1
500 TABLET ORAL 2 TIMES DAILY
Qty: 14 TABLET | Refills: 0 | Status: SHIPPED | OUTPATIENT
Start: 2020-01-16 | End: 2020-01-23

## 2020-01-16 RX ORDER — AMOXICILLIN AND CLAVULANATE POTASSIUM 875; 125 MG/1; MG/1
1 TABLET, FILM COATED ORAL EVERY 12 HOURS
Qty: 14 TABLET | Refills: 0 | Status: SHIPPED | OUTPATIENT
Start: 2020-01-16 | End: 2020-01-23

## 2020-01-16 NOTE — Clinical Note
Shes doing well.  Continuing abx and sitz baths.  Coming back in 1 week.  I put her on my schedule but figured you could come take a look too.

## 2020-01-16 NOTE — PROGRESS NOTES
Subjective:      Melinda Parekh is a 29 y.o.  who presents for a postpartum visit.  She is status post SVE 2 weeks ago.  Seen last week with superficial dehiscence of 2nd degree laceration.  Overall doing well.  Tolerating antibiotics well.  Doing sitz baths and keeping area dry.  No fevers or chills.  No pain.  No other issues or concerns.      Past Medical History:   Diagnosis Date    H/O elbow surgery     Oral contraceptive use        Current Outpatient Medications:     amoxicillin-clavulanate 875-125mg (AUGMENTIN) 875-125 mg per tablet, Take 1 tablet by mouth every 12 (twelve) hours. for 7 days, Disp: 14 tablet, Rfl: 0    ibuprofen (ADVIL,MOTRIN) 600 MG tablet, Take 1 tablet (600 mg total) by mouth every 6 (six) hours as needed (cramping)., Disp: 40 tablet, Rfl: 0    metroNIDAZOLE (FLAGYL) 500 MG tablet, Take 1 tablet (500 mg total) by mouth 2 (two) times daily. Do not drink alcohol while taking this medication. for 7 days, Disp: 14 tablet, Rfl: 0    prenatal 25/iron fum/folic/dha (PRENATAL-1 ORAL), Take by mouth., Disp: , Rfl:     docusate sodium (COLACE) 100 MG capsule, Take 2 capsules (200 mg total) by mouth 2 (two) times daily as needed for Constipation., Disp: 30 capsule, Rfl: 0      Objective:     Vitals:    20 0936   BP: 110/70       APPEARANCE: Well nourished, well developed, in no acute distress.  PSYCH: Appropriate mood and affect.  NECK:  Supple, no thyromegaly.  ABDOMEN:  Soft, nontender.  GENITOURINARY:  Superficial dehiscence present.  Granulation tissue present.  No discharge.      EXTREMITIES: No edema.          Assessment:     Postpartum care and examination    Other orders  -     amoxicillin-clavulanate 875-125mg (AUGMENTIN) 875-125 mg per tablet; Take 1 tablet by mouth every 12 (twelve) hours. for 7 days  Dispense: 14 tablet; Refill: 0  -     metroNIDAZOLE (FLAGYL) 500 MG tablet; Take 1 tablet (500 mg total) by mouth 2 (two) times daily. Do not drink alcohol while taking  this medication. for 7 days  Dispense: 14 tablet; Refill: 0        Plan:     1. Postpartum course reviewed and discussed with patient.  Continue sitz baths.  Continue Abx x 1 more week.  All questions answered.  Return to clinic in 1 week for visit.

## 2020-01-23 ENCOUNTER — POSTPARTUM VISIT (OUTPATIENT)
Dept: OBSTETRICS AND GYNECOLOGY | Facility: CLINIC | Age: 30
End: 2020-01-23
Attending: STUDENT IN AN ORGANIZED HEALTH CARE EDUCATION/TRAINING PROGRAM
Payer: COMMERCIAL

## 2020-01-23 VITALS
DIASTOLIC BLOOD PRESSURE: 82 MMHG | BODY MASS INDEX: 22.91 KG/M2 | WEIGHT: 129.31 LBS | SYSTOLIC BLOOD PRESSURE: 112 MMHG | HEIGHT: 63 IN

## 2020-01-23 PROCEDURE — 0503F POSTPARTUM CARE VISIT: CPT | Mod: S$GLB,,, | Performed by: STUDENT IN AN ORGANIZED HEALTH CARE EDUCATION/TRAINING PROGRAM

## 2020-01-23 PROCEDURE — 99999 PR PBB SHADOW E&M-EST. PATIENT-LVL III: ICD-10-PCS | Mod: PBBFAC,,, | Performed by: STUDENT IN AN ORGANIZED HEALTH CARE EDUCATION/TRAINING PROGRAM

## 2020-01-23 PROCEDURE — 0503F PR POSTPARTUM CARE VISIT: ICD-10-PCS | Mod: S$GLB,,, | Performed by: STUDENT IN AN ORGANIZED HEALTH CARE EDUCATION/TRAINING PROGRAM

## 2020-01-23 PROCEDURE — 99999 PR PBB SHADOW E&M-EST. PATIENT-LVL III: CPT | Mod: PBBFAC,,, | Performed by: STUDENT IN AN ORGANIZED HEALTH CARE EDUCATION/TRAINING PROGRAM

## 2020-01-23 NOTE — PROGRESS NOTES
Subjective:      Melinda Parekh is a 29 y.o.  who presents for a follow up visit.  She is status post  3 weeks ago.  Seen 2 weeks ago with superficial dehiscence of 2nd degree laceration.  Tolerating antibiotics well.  Doing sitz baths and keeping area dry.  No fevers or chills.  No pain.  No other issues or concerns.      Past Medical History:   Diagnosis Date    H/O elbow surgery     Oral contraceptive use        Current Outpatient Medications:     amoxicillin-clavulanate 875-125mg (AUGMENTIN) 875-125 mg per tablet, Take 1 tablet by mouth every 12 (twelve) hours. for 7 days, Disp: 14 tablet, Rfl: 0    metroNIDAZOLE (FLAGYL) 500 MG tablet, Take 1 tablet (500 mg total) by mouth 2 (two) times daily. Do not drink alcohol while taking this medication. for 7 days, Disp: 14 tablet, Rfl: 0    docusate sodium (COLACE) 100 MG capsule, Take 2 capsules (200 mg total) by mouth 2 (two) times daily as needed for Constipation., Disp: 30 capsule, Rfl: 0    ibuprofen (ADVIL,MOTRIN) 600 MG tablet, Take 1 tablet (600 mg total) by mouth every 6 (six) hours as needed (cramping)., Disp: 40 tablet, Rfl: 0    prenatal 25/iron fum/folic/dha (PRENATAL-1 ORAL), Take by mouth., Disp: , Rfl:       Objective:     Vitals:    20 1039   BP: 112/82       APPEARANCE: Well nourished, well developed, in no acute distress.  PSYCH: Appropriate mood and affect.  NECK:  Supple, no thyromegaly.  ABDOMEN:  Soft, nontender.  GENITOURINARY:  Superficial dehiscence present - much improved from last visit, extends approximately 1 cm inferiorly from vaginal introitus.  Well approximated.  EXTREMITIES: No edema.          Assessment:     Postpartum care and examination        Plan:     1. Postpartum course reviewed and discussed with patient.  Continue sitz baths.  RTC in 2 weeks for follow up.

## 2020-02-13 ENCOUNTER — POSTPARTUM VISIT (OUTPATIENT)
Dept: OBSTETRICS AND GYNECOLOGY | Facility: CLINIC | Age: 30
End: 2020-02-13
Attending: OBSTETRICS & GYNECOLOGY
Payer: COMMERCIAL

## 2020-02-13 VITALS
SYSTOLIC BLOOD PRESSURE: 105 MMHG | WEIGHT: 125.44 LBS | HEIGHT: 63 IN | DIASTOLIC BLOOD PRESSURE: 80 MMHG | BODY MASS INDEX: 22.23 KG/M2

## 2020-02-13 PROCEDURE — 99999 PR PBB SHADOW E&M-EST. PATIENT-LVL III: CPT | Mod: PBBFAC,,, | Performed by: OBSTETRICS & GYNECOLOGY

## 2020-02-13 PROCEDURE — 0503F PR POSTPARTUM CARE VISIT: ICD-10-PCS | Mod: S$GLB,,, | Performed by: OBSTETRICS & GYNECOLOGY

## 2020-02-13 PROCEDURE — 0503F POSTPARTUM CARE VISIT: CPT | Mod: S$GLB,,, | Performed by: OBSTETRICS & GYNECOLOGY

## 2020-02-13 PROCEDURE — 99999 PR PBB SHADOW E&M-EST. PATIENT-LVL III: ICD-10-PCS | Mod: PBBFAC,,, | Performed by: OBSTETRICS & GYNECOLOGY

## 2020-02-13 RX ORDER — ALPRAZOLAM 0.5 MG/1
0.5 TABLET ORAL
Qty: 5 TABLET | Refills: 0 | Status: SHIPPED | OUTPATIENT
Start: 2020-02-13 | End: 2020-02-26

## 2020-02-13 RX ORDER — DROSPIRENONE AND ETHINYL ESTRADIOL 0.02-3(28)
1 KIT ORAL DAILY
Qty: 30 TABLET | Refills: 11 | Status: SHIPPED | OUTPATIENT
Start: 2020-02-13 | End: 2020-02-26

## 2020-02-13 NOTE — PROGRESS NOTES
"Subjective:       Melinda Parekh is a 29 y.o. female who presents for a postpartum visit. She is 6 weeks postpartum following a Vaginal Delivery. I have fully reviewed the prenatal and intrapartum course. The delivery was at 37 gestational weeks. Anesthesia: epidural. Labor and delivery was complicated by nothing. Baby is feeding by bottle. Bleeding no bleeding. Bowel function is normal. Bladder function is normal. Postpartum depression screening: negative.     Review the Delivery Report for details.     The patient's history were reviewed and updated.    Review of Systems  Review of Systems       Objective:      /80   Ht 5' 3" (1.6 m)   Wt 56.9 kg (125 lb 7.1 oz)   LMP 04/15/2019   Breastfeeding? No   BMI 22.22 kg/m²    General:  alert and cooperative   Abdomen: soft, non-tender; bowel sounds normal; no masses,  no organomegaly some superficial separation of epis site that is bothersome to the patient. deep musles are intact.       Vulva:  normal   Vagina: normal vagina, no discharge, exudate, lesion, or erythema   Cervix:  no lesions   Corpus: normal size, contour, position, consistency, mobility, non-tender   Adnexa:  no mass, fullness, tenderness   Rectal Exam: Not performed.          Assessment:      6 week postpartum exam.       Plan:      1. Contraception: OCP (estrogen/progesterone)  2. Follow up for revision of epis site in office  "

## 2020-02-24 ENCOUNTER — TELEPHONE (OUTPATIENT)
Dept: OBSTETRICS AND GYNECOLOGY | Facility: CLINIC | Age: 30
End: 2020-02-24

## 2020-02-24 ENCOUNTER — PATIENT MESSAGE (OUTPATIENT)
Dept: OBSTETRICS AND GYNECOLOGY | Facility: CLINIC | Age: 30
End: 2020-02-24

## 2020-02-24 NOTE — TELEPHONE ENCOUNTER
Dr Smith pt calling, pt states she is having a problem but wants to speak to the nurse about it.Pt # [phone]

## 2020-02-24 NOTE — TELEPHONE ENCOUNTER
Spoke with pt and informed her that I spoke with Dr. Smith and at her last visit her incision was healed up.  She thinks it could be the start of the flu.    Advised her to monitor symptoms and increase fluids.  Pt said she isn't concerned if Dr. Smith isn't  Pt will keep us posted if worsens.

## 2020-02-26 ENCOUNTER — OFFICE VISIT (OUTPATIENT)
Dept: PRIMARY CARE CLINIC | Facility: CLINIC | Age: 30
End: 2020-02-26
Payer: COMMERCIAL

## 2020-02-26 VITALS
HEIGHT: 63 IN | RESPIRATION RATE: 18 BRPM | OXYGEN SATURATION: 98 % | SYSTOLIC BLOOD PRESSURE: 114 MMHG | HEART RATE: 100 BPM | BODY MASS INDEX: 22.3 KG/M2 | DIASTOLIC BLOOD PRESSURE: 68 MMHG | TEMPERATURE: 102 F | WEIGHT: 125.88 LBS

## 2020-02-26 DIAGNOSIS — K52.9 GASTROENTERITIS: Primary | ICD-10-CM

## 2020-02-26 DIAGNOSIS — R68.89 FLU-LIKE SYMPTOMS: ICD-10-CM

## 2020-02-26 LAB
CTP QC/QA: YES
CTP QC/QA: YES
POC MOLECULAR INFLUENZA A AGN: NEGATIVE
POC MOLECULAR INFLUENZA B AGN: NEGATIVE
S PYO RRNA THROAT QL PROBE: NEGATIVE

## 2020-02-26 PROCEDURE — 3008F PR BODY MASS INDEX (BMI) DOCUMENTED: ICD-10-PCS | Mod: CPTII,S$GLB,, | Performed by: FAMILY MEDICINE

## 2020-02-26 PROCEDURE — 99213 OFFICE O/P EST LOW 20 MIN: CPT | Mod: 25,S$GLB,, | Performed by: FAMILY MEDICINE

## 2020-02-26 PROCEDURE — 99213 PR OFFICE/OUTPT VISIT, EST, LEVL III, 20-29 MIN: ICD-10-PCS | Mod: 25,S$GLB,, | Performed by: FAMILY MEDICINE

## 2020-02-26 PROCEDURE — 99999 PR PBB SHADOW E&M-EST. PATIENT-LVL III: ICD-10-PCS | Mod: PBBFAC,,, | Performed by: FAMILY MEDICINE

## 2020-02-26 PROCEDURE — 87880 POCT RAPID STREP A: ICD-10-PCS | Mod: QW,S$GLB,, | Performed by: FAMILY MEDICINE

## 2020-02-26 PROCEDURE — 87502 POCT INFLUENZA A/B MOLECULAR: ICD-10-PCS | Mod: QW,S$GLB,, | Performed by: FAMILY MEDICINE

## 2020-02-26 PROCEDURE — 87502 INFLUENZA DNA AMP PROBE: CPT | Mod: QW,S$GLB,, | Performed by: FAMILY MEDICINE

## 2020-02-26 PROCEDURE — 99999 PR PBB SHADOW E&M-EST. PATIENT-LVL III: CPT | Mod: PBBFAC,,, | Performed by: FAMILY MEDICINE

## 2020-02-26 PROCEDURE — 87880 STREP A ASSAY W/OPTIC: CPT | Mod: QW,S$GLB,, | Performed by: FAMILY MEDICINE

## 2020-02-26 PROCEDURE — 3008F BODY MASS INDEX DOCD: CPT | Mod: CPTII,S$GLB,, | Performed by: FAMILY MEDICINE

## 2020-02-26 NOTE — PROGRESS NOTES
"Subjective:       Patient ID: Melinda Parekh is a 29 y.o. female.    Chief Complaint: Fever (since0 02/20); Fatigue; and Generalized Body Aches    29yr old with no sig past medical history here for urgent visit complaining of fever and chills for the past 6 days. Has two children, one in day care who has been sick lately. Some diarrhea. No N/V. No sore throat, congestion. No neck or head pain/stiffness.     Review of Systems   Constitutional: Positive for chills and fever. Negative for activity change.   Respiratory: Negative for cough, chest tightness, shortness of breath and wheezing.    Cardiovascular: Negative for chest pain, palpitations and leg swelling.   Gastrointestinal: Positive for diarrhea. Negative for abdominal distention, abdominal pain, constipation, nausea and vomiting.   Skin: Negative for rash.       Objective:      Vitals:    02/26/20 1548   BP: 114/68   BP Location: Left arm   Patient Position: Sitting   BP Method: Medium (Manual)   Pulse: 100   Resp: 18   Temp: (!) 101.8 °F (38.8 °C)   TempSrc: Oral   SpO2: 98%   Weight: 57.1 kg (125 lb 14.1 oz)   Height: 5' 3" (1.6 m)     Physical Exam   Constitutional: She appears well-developed and well-nourished.   HENT:   Head: Normocephalic and atraumatic.   Nose: Nose normal.   Mouth/Throat: Oropharynx is clear and moist.   Eyes: Conjunctivae and EOM are normal.   Cardiovascular: Normal rate, regular rhythm and normal heart sounds.   Pulmonary/Chest: Effort normal and breath sounds normal. No respiratory distress. She has no wheezes. She has no rales.   Abdominal: Soft. She exhibits no distension. There is no tenderness.   Lymphadenopathy:     She has no cervical adenopathy.   Neurological: She is alert. No cranial nerve deficit.   Psychiatric: She has a normal mood and affect.   Nursing note and vitals reviewed.          Lab Results   Component Value Date     06/27/2019    K 3.6 06/27/2019     06/27/2019    CO2 26 06/27/2019    BUN 10 " 06/27/2019    CREATININE 0.6 06/27/2019    ANIONGAP 9 06/27/2019     No results found for: HGBA1C  No results found for: BNP, BNPTRIAGEBLO    Lab Results   Component Value Date    WBC 11.38 01/04/2020    HGB 11.3 (L) 01/04/2020    HCT 33.9 (L) 01/04/2020     01/04/2020    GRAN 8.4 (H) 01/04/2020    GRAN 73.7 (H) 01/04/2020     No results found for: CHOL, HDL, LDLCALC, TRIG     No current outpatient medications on file.        Assessment:       1. Gastroenteritis    2. Flu-like symptoms           Plan:       Gastroenteritis  Fever, chills, and new onset diarrhea. Fever beginning 6 days ago. Well appearing. Suspect gastroenteritis. Flu/strep negative. Encourage hydration, rest. RTC if no improvement in the next 5 days.     Flu-like symptoms  -     POCT Influenza A/B Molecular  -     POCT Rapid Strep A

## 2020-02-27 ENCOUNTER — HOSPITAL ENCOUNTER (EMERGENCY)
Facility: OTHER | Age: 30
Discharge: HOME | End: 2020-02-27
Attending: EMERGENCY MEDICINE
Payer: COMMERCIAL

## 2020-02-27 VITALS
HEART RATE: 86 BPM | TEMPERATURE: 98 F | BODY MASS INDEX: 22.32 KG/M2 | WEIGHT: 126 LBS | OXYGEN SATURATION: 100 % | SYSTOLIC BLOOD PRESSURE: 111 MMHG | HEIGHT: 63 IN | RESPIRATION RATE: 18 BRPM | DIASTOLIC BLOOD PRESSURE: 68 MMHG

## 2020-02-27 DIAGNOSIS — B34.9 VIRAL ILLNESS: Primary | ICD-10-CM

## 2020-02-27 DIAGNOSIS — R50.9 FEVER, UNSPECIFIED FEVER CAUSE: ICD-10-CM

## 2020-02-27 LAB
ALBUMIN SERPL BCP-MCNC: 3.1 G/DL (ref 3.5–5.2)
ALP SERPL-CCNC: 59 U/L (ref 55–135)
ALT SERPL W/O P-5'-P-CCNC: 22 U/L (ref 10–44)
ANION GAP SERPL CALC-SCNC: 11 MMOL/L (ref 8–16)
AST SERPL-CCNC: 18 U/L (ref 10–40)
BACTERIA #/AREA URNS HPF: ABNORMAL /HPF
BASOPHILS # BLD AUTO: 0.02 K/UL (ref 0–0.2)
BASOPHILS NFR BLD: 0.2 % (ref 0–1.9)
BILIRUB SERPL-MCNC: 1.2 MG/DL (ref 0.1–1)
BILIRUB UR QL STRIP: ABNORMAL
BUN SERPL-MCNC: 11 MG/DL (ref 6–20)
CALCIUM SERPL-MCNC: 8.5 MG/DL (ref 8.7–10.5)
CHLORIDE SERPL-SCNC: 104 MMOL/L (ref 95–110)
CK SERPL-CCNC: 29 U/L (ref 20–180)
CLARITY UR: CLEAR
CO2 SERPL-SCNC: 21 MMOL/L (ref 23–29)
COLOR UR: ABNORMAL
CREAT SERPL-MCNC: 0.8 MG/DL (ref 0.5–1.4)
DIFFERENTIAL METHOD: ABNORMAL
EOSINOPHIL # BLD AUTO: 0 K/UL (ref 0–0.5)
EOSINOPHIL NFR BLD: 0.1 % (ref 0–8)
ERYTHROCYTE [DISTWIDTH] IN BLOOD BY AUTOMATED COUNT: 11.5 % (ref 11.5–14.5)
EST. GFR  (AFRICAN AMERICAN): >60 ML/MIN/1.73 M^2
EST. GFR  (NON AFRICAN AMERICAN): >60 ML/MIN/1.73 M^2
GLUCOSE SERPL-MCNC: 99 MG/DL (ref 70–110)
GLUCOSE UR QL STRIP: NEGATIVE
HCT VFR BLD AUTO: 32.7 % (ref 37–48.5)
HGB BLD-MCNC: 11.4 G/DL (ref 12–16)
HGB UR QL STRIP: ABNORMAL
HYALINE CASTS #/AREA URNS LPF: 1 /LPF
IMM GRANULOCYTES # BLD AUTO: 0.04 K/UL (ref 0–0.04)
IMM GRANULOCYTES NFR BLD AUTO: 0.5 % (ref 0–0.5)
KETONES UR QL STRIP: ABNORMAL
LEUKOCYTE ESTERASE UR QL STRIP: NEGATIVE
LYMPHOCYTES # BLD AUTO: 1 K/UL (ref 1–4.8)
LYMPHOCYTES NFR BLD: 11.3 % (ref 18–48)
MCH RBC QN AUTO: 29.1 PG (ref 27–31)
MCHC RBC AUTO-ENTMCNC: 34.9 G/DL (ref 32–36)
MCV RBC AUTO: 83 FL (ref 82–98)
MICROSCOPIC COMMENT: ABNORMAL
MONOCYTES # BLD AUTO: 0.6 K/UL (ref 0.3–1)
MONOCYTES NFR BLD: 6.9 % (ref 4–15)
NEUTROPHILS # BLD AUTO: 7.1 K/UL (ref 1.8–7.7)
NEUTROPHILS NFR BLD: 81 % (ref 38–73)
NITRITE UR QL STRIP: POSITIVE
NRBC BLD-RTO: 0 /100 WBC
PH UR STRIP: 7 [PH] (ref 5–8)
PLATELET # BLD AUTO: 155 K/UL (ref 150–350)
PMV BLD AUTO: 10.5 FL (ref 9.2–12.9)
POTASSIUM SERPL-SCNC: 3.5 MMOL/L (ref 3.5–5.1)
PROT SERPL-MCNC: 6.3 G/DL (ref 6–8.4)
PROT UR QL STRIP: ABNORMAL
RBC # BLD AUTO: 3.92 M/UL (ref 4–5.4)
RBC #/AREA URNS HPF: 3 /HPF (ref 0–4)
SODIUM SERPL-SCNC: 136 MMOL/L (ref 136–145)
SP GR UR STRIP: 1.02 (ref 1–1.03)
SQUAMOUS #/AREA URNS HPF: 3 /HPF
URN SPEC COLLECT METH UR: ABNORMAL
UROBILINOGEN UR STRIP-ACNC: ABNORMAL EU/DL
WBC # BLD AUTO: 8.71 K/UL (ref 3.9–12.7)
WBC #/AREA URNS HPF: 6 /HPF (ref 0–5)

## 2020-02-27 PROCEDURE — 96375 TX/PRO/DX INJ NEW DRUG ADDON: CPT

## 2020-02-27 PROCEDURE — 85025 COMPLETE CBC W/AUTO DIFF WBC: CPT

## 2020-02-27 PROCEDURE — 96361 HYDRATE IV INFUSION ADD-ON: CPT

## 2020-02-27 PROCEDURE — 87086 URINE CULTURE/COLONY COUNT: CPT

## 2020-02-27 PROCEDURE — 81000 URINALYSIS NONAUTO W/SCOPE: CPT

## 2020-02-27 PROCEDURE — 80053 COMPREHEN METABOLIC PANEL: CPT

## 2020-02-27 PROCEDURE — 63600175 PHARM REV CODE 636 W HCPCS: Performed by: EMERGENCY MEDICINE

## 2020-02-27 PROCEDURE — 96365 THER/PROPH/DIAG IV INF INIT: CPT

## 2020-02-27 PROCEDURE — 99284 EMERGENCY DEPT VISIT MOD MDM: CPT | Mod: 25

## 2020-02-27 PROCEDURE — 82550 ASSAY OF CK (CPK): CPT

## 2020-02-27 RX ORDER — KETOROLAC TROMETHAMINE 30 MG/ML
15 INJECTION, SOLUTION INTRAMUSCULAR; INTRAVENOUS
Status: COMPLETED | OUTPATIENT
Start: 2020-02-27 | End: 2020-02-27

## 2020-02-27 RX ORDER — ONDANSETRON 4 MG/1
4 TABLET, ORALLY DISINTEGRATING ORAL EVERY 8 HOURS PRN
Qty: 15 TABLET | Refills: 0 | Status: SHIPPED | OUTPATIENT
Start: 2020-02-27 | End: 2020-03-18

## 2020-02-27 RX ADMIN — KETOROLAC TROMETHAMINE 15 MG: 30 INJECTION, SOLUTION INTRAMUSCULAR; INTRAVENOUS at 09:02

## 2020-02-27 RX ADMIN — SODIUM CHLORIDE 1000 ML: 0.9 INJECTION, SOLUTION INTRAVENOUS at 08:02

## 2020-02-27 RX ADMIN — SODIUM CHLORIDE 1000 ML: 0.9 INJECTION, SOLUTION INTRAVENOUS at 09:02

## 2020-02-27 RX ADMIN — PROMETHAZINE HYDROCHLORIDE 12.5 MG: 25 INJECTION INTRAMUSCULAR; INTRAVENOUS at 11:02

## 2020-02-27 NOTE — ED TRIAGE NOTES
"Pt reports to ED with c/o generalized body aches, fever, N/V x2-3 days. States that she had recent work up negative for flu and strep. Pt 8 weeks post partum; denies vaginal discharge. When pt provided urine sample, she states that urine is "darker than normal," but denies any urinary complaints.     Two patient identifiers have been checked and are correct.      Appearance: Pt awake, alert & oriented to person, place & time. Pt in no acute distress at present time. Pt is clean and well groomed with clothes appropriately fastened.   Skin: Skin warm, dry & intact. Color consistent with ethnicity. Mucous membranes moist. No breakdown or brusing noted.   Musculoskeletal: Patient moving all extremities well, no obvious swelling or deformities noted.   Respiratory: Respirations spontaneous, even, and non-labored. Visible chest rise noted. Airway is open and patent. No accessory muscle use noted.   Neurologic: Sensation is intact. Speech is clear and appropriate. Eyes open spontaneously, behavior appropriate to situation, follows commands, facial expression symmetrical, bilateral hand grasp equal and even, purposeful motor response noted.  Cardiac: All peripheral pulses present. No Bilateral lower extremity edema. Cap refill is <3 seconds.  Abdomen: Abdomen soft, non-tender to palpation.   : Pt reports no dysuria or hematuria.             "

## 2020-02-27 NOTE — ED PROVIDER NOTES
Encounter Date: 2/27/2020    SCRIBE #1 NOTE: IKarina, chencho scribing for, and in the presence of, Dr. Orellana.       History     Chief Complaint   Patient presents with    Generalized Body Aches     Pt c/o generalized aches, nausea & fever 3 days, aches worse this am.  Tested negative for strep & flu yesterday. Reports fever=101 yesterday. Last dose of tylenol 6:30 this am. Pt also c/o diarrhea which started yesterday. Pt is 8 weeks post pardum, had an infectin at 2 weeks & received PO ABX. Pt reports that her OB does not believe her current symptoms are related to the previous in fection.     Time seen by provider: 8:58 AM    This is a 29 y.o. female who presents with complaint of generalized body aches that worsened this morning. There is associated fever, and chills. Patient reports a temperature of 101.8 degrees yesterday. She had fever and body aches 6 days ago and saw her PCP yesterday. She had negative strep and flu swaps at that time. She reports 2 episodes of diarrhea that began yesterday, decreased appetite and nausea. She denies any sick contacts or suspicious PO intake. Denies abdominal cramping, vomiting, or blood in stool. Denies rhinorrhea, cough, congestion, sore throat, dysuria, urinary frequency or urinary urgency. She denies relief of body aches with use of tylenol. She had a normal vaginal delivery on 1/3 and had an episiotomy for which she took antibiotics. She had post partum follow up with her OB 2 weeks ago and states everything was fine. Patient denies any significant past medical history.    The history is provided by the patient.     Review of patient's allergies indicates:   Allergen Reactions    No known drug allergies      Past Medical History:   Diagnosis Date    H/O elbow surgery     Oral contraceptive use      Past Surgical History:   Procedure Laterality Date    left elbow Left 03/30/2017    torn ligament     Family History   Problem Relation Age of Onset    No Known  Problems Maternal Grandmother     No Known Problems Sister     Heart attack Maternal Grandfather     No Known Problems Paternal Grandmother     No Known Problems Paternal Grandfather     Skin cancer Other     Breast cancer Other     Colon cancer Neg Hx     Ovarian cancer Neg Hx      Social History     Tobacco Use    Smoking status: Never Smoker    Smokeless tobacco: Never Used   Substance Use Topics    Alcohol use: Yes    Drug use: No     Review of Systems   Constitutional: Positive for appetite change, chills and fever.   HENT: Negative for congestion, rhinorrhea, sore throat and trouble swallowing.    Eyes: Negative for photophobia and visual disturbance.   Respiratory: Negative for shortness of breath.    Cardiovascular: Negative for chest pain.   Gastrointestinal: Positive for diarrhea and nausea. Negative for abdominal pain, blood in stool and vomiting.   Genitourinary: Negative for dysuria, frequency, hematuria and urgency.   Musculoskeletal: Positive for myalgias. Negative for back pain and neck pain.   Skin: Negative for rash and wound.   Neurological: Negative for weakness and headaches.   Psychiatric/Behavioral: Negative.        Physical Exam     Initial Vitals [02/27/20 0740]   BP Pulse Resp Temp SpO2   106/66 109 18 98.8 °F (37.1 °C) 96 %      MAP       --         Physical Exam    Nursing note and vitals reviewed.  Constitutional: She appears well-developed and well-nourished. No distress.   HENT:   Head: Normocephalic and atraumatic.   Eyes: Conjunctivae and EOM are normal. Pupils are equal, round, and reactive to light.   Neck: Normal range of motion. Neck supple.   Cardiovascular: Regular rhythm and normal heart sounds. Tachycardia present.    No murmur heard.  Pulmonary/Chest: Breath sounds normal. No respiratory distress. She has no wheezes. She has no rhonchi. She has no rales.   Abdominal: Soft. There is no tenderness. There is no rebound and no guarding.   Musculoskeletal: Normal  range of motion. She exhibits no edema or tenderness.   Neurological: She is alert and oriented to person, place, and time. She has normal strength.   Skin: Skin is warm and dry. No rash noted.   Psychiatric: She has a normal mood and affect. Her behavior is normal. Judgment and thought content normal.         ED Course   Procedures  Labs Reviewed   COMPREHENSIVE METABOLIC PANEL - Abnormal; Notable for the following components:       Result Value    CO2 21 (*)     Calcium 8.5 (*)     Albumin 3.1 (*)     Total Bilirubin 1.2 (*)     All other components within normal limits   CBC W/ AUTO DIFFERENTIAL - Abnormal; Notable for the following components:    RBC 3.92 (*)     Hemoglobin 11.4 (*)     Hematocrit 32.7 (*)     Gran% 81.0 (*)     Lymph% 11.3 (*)     All other components within normal limits   URINALYSIS, REFLEX TO URINE CULTURE - Abnormal; Notable for the following components:    Protein, UA 2+ (*)     Ketones, UA Trace (*)     Bilirubin (UA) 2+ (*)     Occult Blood UA Trace (*)     Nitrite, UA Positive (*)     Urobilinogen, UA 2.0-3.0 (*)     All other components within normal limits    Narrative:     Preferred Collection Type->Urine, Clean Catch   URINALYSIS MICROSCOPIC - Abnormal; Notable for the following components:    WBC, UA 6 (*)     Bacteria Few (*)     All other components within normal limits    Narrative:     Preferred Collection Type->Urine, Clean Catch   CULTURE, URINE   CULTURE, URINE   CK   CK          Imaging Results    None          Medical Decision Making:   History:   Old Medical Records: I decided to obtain old medical records.  Initial Assessment:       Healthy 29-year-old female presents for evaluation of persistent fever, body aches for the past 6 days.  No known sick contacts, at onset she had no other symptoms associated with fever and body aches except for decreased appetite and occasional nausea.  She had a few episodes of loose stools yesterday, and saw PCP then with negative strep  and flu, diagnosed with viral syndrome.  Today she came to ED due to worsening body aches not improved with Tylenol.  She had recent normal vaginal delivery about a weeks ago, and took antibiotics for wound dehiscence of episiotomy, but since then has been well until the past week.  No redness or pain to episiotomy site now.  No other associated symptoms such as abdominal pain, cough/congestion, neck pain, rash, dysuria or frequency.  On arrival patient is afebrile and well-appearing with slight tachycardia.  Exam otherwise unremarkable with no concerning findings or signs to suggest source of fever.  No sign meningitis.    Most likely viral illness, or gastroenteritis; will check basic labs to ensure no abnormalities or signs of severe dehydration, and treat supportively.       Basic labs with no elevated WBC.  She does have slightly elevated total bilirubin but otherwise normal LFTs and no right upper quadrant tenderness to suggest obstructive liver disease or acute cholecystitis.  UA does show positive nitrites in addition to protein and bilirubin, but negative leukocytes and 6 WBC, equivocal for UTI.  Patient has no corresponding urinary symptoms, so I suspect contaminant is more likely than true UTI.  After IVF and Toradol patient still feels body aches, though tachycardia resolved.  CPK added on and normal. She then had episode of vomiting, with nausea resolved after Phenergan and tolerating p.o..  Will send urine culture and hold empiric antibiotics, and continue supportive care for likely viral illness.  She is comfortable with this discharge plan and will follow up with PCP to ensure improvement and return to ED for any worsening symptoms or other concerns.       Clinical Tests:   Lab Tests: Ordered and Reviewed            Scribe Attestation:   Scribe #1: I performed the above scribed service and the documentation accurately describes the services I performed. I attest to the accuracy of the  note.    Attending Attestation:           Physician Attestation for Scribe:  Physician Attestation Statement for Scribe #1: I, Dr. Orellana, reviewed documentation, as scribed by Karina Rankin in my presence, and it is both accurate and complete.                               Clinical Impression:     1. Viral illness    2. Fever, unspecified fever cause              ED Disposition Condition    Discharge Stable        ED Prescriptions     Medication Sig Dispense Start Date End Date Auth. Provider    ondansetron (ZOFRAN-ODT) 4 MG TbDL Take 1 tablet (4 mg total) by mouth every 8 (eight) hours as needed. 15 tablet 2/27/2020  Yayo Orellana MD        Follow-up Information     Follow up With Specialties Details Why Contact Info    [facility] Go to  If symptoms worsen [address] [address]  [phone]                                     Yayo Orellana MD  02/28/20 0229

## 2020-02-27 NOTE — ED NOTES
"Patient denies improvement in her symptoms. States, "I feel worse, I just threw up in the garbage can." MD notified.   "

## 2020-02-28 LAB — BACTERIA UR CULT: NO GROWTH

## 2020-03-03 ENCOUNTER — HOSPITAL ENCOUNTER (INPATIENT)
Facility: OTHER | Age: 30
LOS: 8 days | Discharge: HOME | DRG: 099 | End: 2020-03-11
Attending: EMERGENCY MEDICINE | Admitting: INTERNAL MEDICINE
Payer: COMMERCIAL

## 2020-03-03 DIAGNOSIS — G03.9 MENINGITIS: ICD-10-CM

## 2020-03-03 DIAGNOSIS — R42 DIZZINESS: ICD-10-CM

## 2020-03-03 DIAGNOSIS — R50.9 ACUTE FEBRILE ILLNESS: Primary | ICD-10-CM

## 2020-03-03 DIAGNOSIS — E86.0 DEHYDRATION: ICD-10-CM

## 2020-03-03 PROBLEM — D72.829 LEUKOCYTOSIS: Status: ACTIVE | Noted: 2020-03-03

## 2020-03-03 PROBLEM — D64.9 ANEMIA: Status: ACTIVE | Noted: 2020-03-03

## 2020-03-03 PROBLEM — R74.01 TRANSAMINITIS: Status: ACTIVE | Noted: 2020-03-03

## 2020-03-03 LAB
ALBUMIN SERPL BCP-MCNC: 2.4 G/DL (ref 3.5–5.2)
ALP SERPL-CCNC: 161 U/L (ref 55–135)
ALT SERPL W/O P-5'-P-CCNC: 37 U/L (ref 10–44)
ANION GAP SERPL CALC-SCNC: 14 MMOL/L (ref 8–16)
ANISOCYTOSIS BLD QL SMEAR: SLIGHT
AST SERPL-CCNC: 45 U/L (ref 10–40)
BACTERIA #/AREA URNS HPF: ABNORMAL /HPF
BASOPHILS # BLD AUTO: ABNORMAL K/UL (ref 0–0.2)
BASOPHILS NFR BLD: 0 % (ref 0–1.9)
BILIRUB SERPL-MCNC: 0.6 MG/DL (ref 0.1–1)
BILIRUB UR QL STRIP: NEGATIVE
BUN SERPL-MCNC: 8 MG/DL (ref 6–20)
CALCIUM SERPL-MCNC: 9.5 MG/DL (ref 8.7–10.5)
CHLORIDE SERPL-SCNC: 101 MMOL/L (ref 95–110)
CLARITY CSF: ABNORMAL
CLARITY UR: CLEAR
CO2 SERPL-SCNC: 24 MMOL/L (ref 23–29)
COLOR CSF: COLORLESS
COLOR UR: YELLOW
CREAT SERPL-MCNC: 0.8 MG/DL (ref 0.5–1.4)
CTP QC/QA: YES
DIFFERENTIAL METHOD: ABNORMAL
EOSINOPHIL # BLD AUTO: ABNORMAL K/UL (ref 0–0.5)
EOSINOPHIL NFR BLD: 0 % (ref 0–8)
ERYTHROCYTE [DISTWIDTH] IN BLOOD BY AUTOMATED COUNT: 11.9 % (ref 11.5–14.5)
EST. GFR  (AFRICAN AMERICAN): >60 ML/MIN/1.73 M^2
EST. GFR  (NON AFRICAN AMERICAN): >60 ML/MIN/1.73 M^2
GIANT PLATELETS BLD QL SMEAR: PRESENT
GLUCOSE CSF-MCNC: 39 MG/DL (ref 40–70)
GLUCOSE SERPL-MCNC: 83 MG/DL (ref 70–110)
GLUCOSE UR QL STRIP: NEGATIVE
HCT VFR BLD AUTO: 29.6 % (ref 37–48.5)
HGB BLD-MCNC: 10.3 G/DL (ref 12–16)
HGB UR QL STRIP: ABNORMAL
HYALINE CASTS #/AREA URNS LPF: 0 /LPF
IMM GRANULOCYTES # BLD AUTO: ABNORMAL K/UL (ref 0–0.04)
IMM GRANULOCYTES NFR BLD AUTO: ABNORMAL % (ref 0–0.5)
KETONES UR QL STRIP: NEGATIVE
LACTATE SERPL-SCNC: 1.3 MMOL/L (ref 0.5–2.2)
LEUKOCYTE ESTERASE UR QL STRIP: NEGATIVE
LYMPHOCYTES # BLD AUTO: ABNORMAL K/UL (ref 1–4.8)
LYMPHOCYTES NFR BLD: 6 % (ref 18–48)
LYMPHOCYTES NFR CSF MANUAL: 4 % (ref 40–80)
MCH RBC QN AUTO: 28.6 PG (ref 27–31)
MCHC RBC AUTO-ENTMCNC: 34.8 G/DL (ref 32–36)
MCV RBC AUTO: 82 FL (ref 82–98)
MICROSCOPIC COMMENT: ABNORMAL
MONOCYTES # BLD AUTO: ABNORMAL K/UL (ref 0.3–1)
MONOCYTES NFR BLD: 3 % (ref 4–15)
MONOS+MACROS NFR CSF MANUAL: 28 % (ref 15–45)
NEUTROPHILS NFR BLD: 91 % (ref 38–73)
NEUTROPHILS NFR CSF MANUAL: 68 % (ref 0–6)
NITRITE UR QL STRIP: NEGATIVE
NRBC BLD-RTO: 0 /100 WBC
PH UR STRIP: 7 [PH] (ref 5–8)
PLATELET # BLD AUTO: 235 K/UL (ref 150–350)
PLATELET BLD QL SMEAR: ABNORMAL
PMV BLD AUTO: 10.5 FL (ref 9.2–12.9)
POC MOLECULAR INFLUENZA A AGN: NEGATIVE
POC MOLECULAR INFLUENZA B AGN: NEGATIVE
POTASSIUM SERPL-SCNC: 3.9 MMOL/L (ref 3.5–5.1)
PROT CSF-MCNC: 174 MG/DL (ref 15–40)
PROT SERPL-MCNC: 7.2 G/DL (ref 6–8.4)
PROT UR QL STRIP: ABNORMAL
RBC # BLD AUTO: 3.6 M/UL (ref 4–5.4)
RBC # CSF: 24 /CU MM
RBC #/AREA URNS HPF: 3 /HPF (ref 0–4)
SODIUM SERPL-SCNC: 139 MMOL/L (ref 136–145)
SP GR UR STRIP: 1.01 (ref 1–1.03)
SPECIMEN VOL CSF: 0.5 ML
SQUAMOUS #/AREA URNS HPF: 12 /HPF
URN SPEC COLLECT METH UR: ABNORMAL
UROBILINOGEN UR STRIP-ACNC: NEGATIVE EU/DL
WBC # BLD AUTO: 14.5 K/UL (ref 3.9–12.7)
WBC # CSF: 225 /CU MM (ref 0–5)
WBC #/AREA URNS HPF: 8 /HPF (ref 0–5)

## 2020-03-03 PROCEDURE — 83605 ASSAY OF LACTIC ACID: CPT

## 2020-03-03 PROCEDURE — 82945 GLUCOSE OTHER FLUID: CPT

## 2020-03-03 PROCEDURE — 81000 URINALYSIS NONAUTO W/SCOPE: CPT

## 2020-03-03 PROCEDURE — 87040 BLOOD CULTURE FOR BACTERIA: CPT | Mod: 59

## 2020-03-03 PROCEDURE — 93010 ELECTROCARDIOGRAM REPORT: CPT | Mod: ,,, | Performed by: INTERNAL MEDICINE

## 2020-03-03 PROCEDURE — 25000003 PHARM REV CODE 250: Performed by: EMERGENCY MEDICINE

## 2020-03-03 PROCEDURE — 93005 ELECTROCARDIOGRAM TRACING: CPT

## 2020-03-03 PROCEDURE — 85007 BL SMEAR W/DIFF WBC COUNT: CPT

## 2020-03-03 PROCEDURE — 94761 N-INVAS EAR/PLS OXIMETRY MLT: CPT

## 2020-03-03 PROCEDURE — 96375 TX/PRO/DX INJ NEW DRUG ADDON: CPT

## 2020-03-03 PROCEDURE — 99223 PR INITIAL HOSPITAL CARE,LEVL III: ICD-10-PCS | Mod: ,,, | Performed by: INTERNAL MEDICINE

## 2020-03-03 PROCEDURE — 89051 BODY FLUID CELL COUNT: CPT

## 2020-03-03 PROCEDURE — 63600175 PHARM REV CODE 636 W HCPCS: Performed by: INTERNAL MEDICINE

## 2020-03-03 PROCEDURE — 99291 CRITICAL CARE FIRST HOUR: CPT

## 2020-03-03 PROCEDURE — 63600175 PHARM REV CODE 636 W HCPCS: Performed by: EMERGENCY MEDICINE

## 2020-03-03 PROCEDURE — 11000001 HC ACUTE MED/SURG PRIVATE ROOM

## 2020-03-03 PROCEDURE — 85027 COMPLETE CBC AUTOMATED: CPT

## 2020-03-03 PROCEDURE — 62270 DX LMBR SPI PNXR: CPT

## 2020-03-03 PROCEDURE — 96374 THER/PROPH/DIAG INJ IV PUSH: CPT

## 2020-03-03 PROCEDURE — 93010 EKG 12-LEAD: ICD-10-PCS | Mod: ,,, | Performed by: INTERNAL MEDICINE

## 2020-03-03 PROCEDURE — 87070 CULTURE OTHR SPECIMN AEROBIC: CPT

## 2020-03-03 PROCEDURE — 99223 1ST HOSP IP/OBS HIGH 75: CPT | Mod: ,,, | Performed by: INTERNAL MEDICINE

## 2020-03-03 PROCEDURE — 87529 HSV DNA AMP PROBE: CPT

## 2020-03-03 PROCEDURE — 99000 SPECIMEN HANDLING OFFICE-LAB: CPT

## 2020-03-03 PROCEDURE — 80053 COMPREHEN METABOLIC PANEL: CPT

## 2020-03-03 PROCEDURE — 99292 CRITICAL CARE ADDL 30 MIN: CPT

## 2020-03-03 PROCEDURE — 87205 SMEAR GRAM STAIN: CPT

## 2020-03-03 PROCEDURE — 86788 WEST NILE VIRUS AB IGM: CPT

## 2020-03-03 PROCEDURE — 96361 HYDRATE IV INFUSION ADD-ON: CPT

## 2020-03-03 PROCEDURE — 84157 ASSAY OF PROTEIN OTHER: CPT

## 2020-03-03 RX ORDER — ONDANSETRON 2 MG/ML
4 INJECTION INTRAMUSCULAR; INTRAVENOUS
Status: COMPLETED | OUTPATIENT
Start: 2020-03-03 | End: 2020-03-03

## 2020-03-03 RX ORDER — SODIUM CHLORIDE 0.9 % (FLUSH) 0.9 %
5 SYRINGE (ML) INJECTION
Status: DISCONTINUED | OUTPATIENT
Start: 2020-03-03 | End: 2020-03-11 | Stop reason: HOSPADM

## 2020-03-03 RX ORDER — KETOROLAC TROMETHAMINE 30 MG/ML
10 INJECTION, SOLUTION INTRAMUSCULAR; INTRAVENOUS
Status: COMPLETED | OUTPATIENT
Start: 2020-03-03 | End: 2020-03-03

## 2020-03-03 RX ORDER — SODIUM CHLORIDE 9 MG/ML
INJECTION, SOLUTION INTRAVENOUS CONTINUOUS
Status: DISCONTINUED | OUTPATIENT
Start: 2020-03-03 | End: 2020-03-05

## 2020-03-03 RX ORDER — NORETHINDRONE ACETATE AND ETHINYL ESTRADIOL AND FERROUS FUMARATE 5-7-9-7
KIT ORAL DAILY
COMMUNITY
End: 2020-11-09

## 2020-03-03 RX ORDER — LIDOCAINE HYDROCHLORIDE 10 MG/ML
5 INJECTION INFILTRATION; PERINEURAL
Status: DISPENSED | OUTPATIENT
Start: 2020-03-03 | End: 2020-03-04

## 2020-03-03 RX ORDER — ACETAMINOPHEN 325 MG/1
650 TABLET ORAL EVERY 6 HOURS PRN
Status: DISCONTINUED | OUTPATIENT
Start: 2020-03-03 | End: 2020-03-11 | Stop reason: HOSPADM

## 2020-03-03 RX ORDER — ENOXAPARIN SODIUM 100 MG/ML
40 INJECTION SUBCUTANEOUS EVERY 24 HOURS
Status: DISCONTINUED | OUTPATIENT
Start: 2020-03-04 | End: 2020-03-11 | Stop reason: HOSPADM

## 2020-03-03 RX ORDER — VANCOMYCIN HCL IN 5 % DEXTROSE 1.5G/250ML
1500 PLASTIC BAG, INJECTION (ML) INTRAVENOUS ONCE
Status: COMPLETED | OUTPATIENT
Start: 2020-03-03 | End: 2020-03-03

## 2020-03-03 RX ORDER — BUTALBITAL, ACETAMINOPHEN AND CAFFEINE 50; 325; 40 MG/1; MG/1; MG/1
2 TABLET ORAL
Status: COMPLETED | OUTPATIENT
Start: 2020-03-03 | End: 2020-03-03

## 2020-03-03 RX ADMIN — BUTALBITAL, ACETAMINOPHEN AND CAFFEINE 2 TABLET: 50; 325; 40 TABLET ORAL at 01:03

## 2020-03-03 RX ADMIN — SODIUM CHLORIDE: 0.9 INJECTION, SOLUTION INTRAVENOUS at 06:03

## 2020-03-03 RX ADMIN — SODIUM CHLORIDE 1000 ML: 0.9 INJECTION, SOLUTION INTRAVENOUS at 12:03

## 2020-03-03 RX ADMIN — ONDANSETRON 4 MG: 2 INJECTION INTRAMUSCULAR; INTRAVENOUS at 11:03

## 2020-03-03 RX ADMIN — PROMETHAZINE HYDROCHLORIDE 12.5 MG: 25 INJECTION INTRAMUSCULAR; INTRAVENOUS at 11:03

## 2020-03-03 RX ADMIN — AMPICILLIN SODIUM 2 G: 2 INJECTION, POWDER, FOR SOLUTION INTRAVENOUS at 10:03

## 2020-03-03 RX ADMIN — KETOROLAC TROMETHAMINE 10 MG: 30 INJECTION, SOLUTION INTRAMUSCULAR; INTRAVENOUS at 11:03

## 2020-03-03 RX ADMIN — SODIUM CHLORIDE 1000 ML: 0.9 INJECTION, SOLUTION INTRAVENOUS at 11:03

## 2020-03-03 RX ADMIN — CEFTRIAXONE 2 G: 2 INJECTION, SOLUTION INTRAVENOUS at 08:03

## 2020-03-03 RX ADMIN — SODIUM CHLORIDE 1000 ML: 0.9 INJECTION, SOLUTION INTRAVENOUS at 01:03

## 2020-03-03 RX ADMIN — VANCOMYCIN HYDROCHLORIDE 1500 MG: 100 INJECTION, POWDER, LYOPHILIZED, FOR SOLUTION INTRAVENOUS at 06:03

## 2020-03-03 NOTE — ED NOTES
Report called to Floor nurse. All questions answered. Transport called. Pt updated on plan of care and room assignment

## 2020-03-03 NOTE — HPI
Ms. Parekh is a 29/F with PMH significant for recent vaginal delivery 01/03/20 with episiotomy, subsequent infection with prevotella spp. s/p treatment with amoxicillin-clavulanate and metronidazole who presented to ED 03/03 with a reported 9 day history of illness. She reports symptoms began with diffuse body aches, fever, chills, and fatigue. Tmax at home has been up to 102F. Subsequently developed diarrhea with two episodes daily. As symptoms persisted she visited her PCP 02/26; rapid flu and strep were negative and she was presumed to have gastroenteritis and was treated symptomatically. With progressive body aches presented to ED 02/27 and had nausea/vomiting in addition. Evaluation at that time was notable for no significant leukocytosis, equivocal UA (urine culture no growth), no CK elevation, tachycardia that resolved with fluids and she was subsequently discharged home; empiric antibiotic therapy was held at that time. Upon return home her symptoms continued to worsen. She then developed dizziness, visual disturbance and headache; denied neck pain or stiffness. She subsequently presented to ED 03/03 for further evaluation. ED workup was notable for leukocytosis, Tmax 100.4F, mild transaminitis. Hospital medicine was contacted for admission.

## 2020-03-03 NOTE — ED NOTES
Per Dr Saenz Pt awaiting results of CT scan then will obtain an LP. After tests complete patient will be transported to floor. Nurse on floor notified of delay

## 2020-03-03 NOTE — PROGRESS NOTES
Pharmacokinetic Initial Assessment: IV Vancomycin    Assessment/Plan:    Initiate intravenous vancomycin with loading dose of 1500 mg once followed by a maintenance dose of vancomycin 750 mg IV every 12 hours  Desired empiric serum trough concentration is 10 to 20 mcg/mL  Draw vancomycin trough level 30 min prior to fourth dose on 3/5 at approximately 530   Pharmacy will continue to follow and monitor vancomycin.      Please contact pharmacy at extension [phone] with any questions regarding this assessment.     Thank you for the consult,   Sindhu Marcelino       Patient brief summary:  Melinda Parekh is a 29 y.o. female initiated on antimicrobial therapy with IV Vancomycin for treatment of suspected bacteremia    Drug Allergies:   Review of patient's allergies indicates:   Allergen Reactions    No known drug allergies        Actual Body Weight:   57.2kg    Renal Function:   Estimated Creatinine Clearance: 84.3 mL/min (based on SCr of 0.8 mg/dL).,     Dialysis Method (if applicable):  N/A    CBC (last 72 hours):  Recent Labs   Lab Result Units 03/03/20  1206   WBC K/uL 14.50*   Hemoglobin g/dL 10.3*   Hematocrit % 29.6*   Platelets K/uL 235   Gran% % 91.0*   Lymph% % 6.0*   Mono% % 3.0*   Eosinophil% % 0.0   Basophil% % 0.0   Differential Method  Manual       Metabolic Panel (last 72 hours):  Recent Labs   Lab Result Units 03/03/20  1206 03/03/20  1245   Sodium mmol/L 139  --    Potassium mmol/L 3.9  --    Chloride mmol/L 101  --    CO2 mmol/L 24  --    Glucose mg/dL 83  --    Glucose, UA   --  Negative   BUN, Bld mg/dL 8  --    Creatinine mg/dL 0.8  --    Albumin g/dL 2.4*  --    Total Bilirubin mg/dL 0.6  --    Alkaline Phosphatase U/L 161*  --    AST U/L 45*  --    ALT U/L 37  --        Drug levels (last 3 results):  No results for input(s): VANCOMYCINRA, VANCOMYCINPE, VANCOMYCINTR in the last 72 hours.    Microbiologic Results:  Microbiology Results (last 7 days)       Procedure Component Value Units Date/Time     Respiratory Viral Panel by PCR [facility]; Sputum [[id number]]     Order Status:  No result Specimen:  Respiratory     CSF culture [[id number]] Collected:  03/03/20 1658    Order Status:  Sent Specimen:  CSF (Spinal Fluid) from CSF Tap, Tube 2 Updated:  03/03/20 1718    AFB Culture & Smear [[id number]]     Order Status:  Canceled Specimen:  CSF (Spinal Fluid) from CSF Tap, Tube 2     Blood culture #2 [[id number]] Collected:  03/03/20 1330    Order Status:  Sent Specimen:  Blood Updated:  03/03/20 1343    Blood culture #1 [[id number]] Collected:  03/03/20 1204    Order Status:  Sent Specimen:  Blood from Peripheral, Antecubital, Right Updated:  03/03/20 1206

## 2020-03-03 NOTE — H&P
[facility] Hospital Medicine  History & Physical    Patient Name: Melinda Parekh  MRN: [id number]  Admission Date: 3/3/2020  Attending Physician: FAUSTO Saenz MD  Primary Care Provider: Maicol Jensen MD    Patient information was obtained from patient, spouse/SO, past medical records, sister and ER records.     Subjective:     Principal Problem:Acute febrile illness    Chief Complaint:   Chief Complaint   Patient presents with    Dizziness     Pt c/o intermittent fever, joint aches, V/D. Pt reports dizziness, fatigue, frontal H/A & spinning sensation this am. Pt evaluated earlier this week (-) flu & strep. Pt is 8 weeks post pardum, had infection at 2 weeks post pardum.        HPI: Ms. Parekh is a 29/F with PMH significant for recent vaginal delivery 01/03/20 with episiotomy, subsequent infection with prevotella spp. s/p treatment with amoxicillin-clavulanate and metronidazole who presented to ED 03/03 with a reported 9 day history of illness. She reports symptoms began with diffuse body aches, fever, chills, and fatigue. Tmax at home has been up to 102F. Subsequently developed diarrhea with two episodes daily. As symptoms persisted she visited her PCP 02/26; rapid flu and strep were negative and she was presumed to have gastroenteritis and was treated symptomatically. With progressive body aches presented to ED 02/27 and had nausea/vomiting in addition. Evaluation at that time was notable for no significant leukocytosis, equivocal UA (urine culture no growth), no CK elevation, tachycardia that resolved with fluids and she was subsequently discharged home; empiric antibiotic therapy was held at that time. Upon return home her symptoms continued to worsen. She then developed dizziness, visual disturbance and headache; denied neck pain or stiffness. She subsequently presented to ED 03/03 for further evaluation. ED workup was notable for leukocytosis, Tmax 100.4F, mild transaminitis. Hospital  medicine was contacted for admission.    Past Medical History:   Diagnosis Date    H/O elbow surgery     Oral contraceptive use        Past Surgical History:   Procedure Laterality Date    left elbow Left 03/30/2017    torn ligament       Review of patient's allergies indicates:   Allergen Reactions    No known drug allergies        No current facility-administered medications on file prior to encounter.      Current Outpatient Medications on File Prior to Encounter   Medication Sig    norethindrone-ethinyl estradiol-iron (ESTROSTEP FE) 1-20(5)/1-30(7) /1mg-35mcg (9) Tab Take by mouth once daily.    ondansetron (ZOFRAN-ODT) 4 MG TbDL Take 1 tablet (4 mg total) by mouth every 8 (eight) hours as needed.     Family History     Problem Relation (Age of Onset)    Breast cancer Other    Heart attack Maternal Grandfather    No Known Problems Maternal Grandmother, Sister, Paternal Grandmother, Paternal Grandfather    Skin cancer Other        Tobacco Use    Smoking status: Never Smoker    Smokeless tobacco: Never Used   Substance and Sexual Activity    Alcohol use: Yes    Drug use: No    Sexual activity: Yes     Partners: Male     Review of Systems   Constitutional: Positive for chills, diaphoresis, fatigue and fever.   HENT: Negative for sore throat and trouble swallowing.    Eyes: Negative for pain and visual disturbance.   Respiratory: Positive for cough. Negative for shortness of breath.    Cardiovascular: Negative for chest pain and palpitations.   Gastrointestinal: Positive for diarrhea, nausea and vomiting. Negative for abdominal pain.   Genitourinary: Negative for difficulty urinating and dysuria.   Musculoskeletal: Negative for arthralgias and myalgias.   Skin: Positive for rash. Negative for wound.   Neurological: Positive for dizziness and headaches. Negative for weakness and numbness.     Objective:     Vital Signs (Most Recent):  Temp: 99.7 °F (37.6 °C) (03/03/20 1430)  Pulse: 110 (03/03/20  1531)  Resp: (!) 8 (03/03/20 1531)  BP: 111/70 (03/03/20 1531)  SpO2: 98 % (03/03/20 1531) Vital Signs (24h Range):  Temp:  [99.7 °F (37.6 °C)-100.4 °F (38 °C)] 99.7 °F (37.6 °C)  Pulse:  [110-128] 110  Resp:  [8-26] 8  SpO2:  [98 %-100 %] 98 %  BP: (111-138)/(70-85) 111/70     Weight: 57.2 kg (126 lb)  Body mass index is 22.32 kg/m².    Physical Exam   Constitutional: She is oriented to person, place, and time. She appears well-developed. No distress.   HENT:   Head: Normocephalic and atraumatic.   No nuchal rigidity. No nystagmus noted on exam.   Eyes: Conjunctivae are normal. Right eye exhibits no discharge. Left eye exhibits no discharge.   Cardiovascular: Regular rhythm and intact distal pulses. Tachycardia present.   Pulmonary/Chest: Effort normal and breath sounds normal. No respiratory distress.   Abdominal: Soft. There is no tenderness.   Musculoskeletal: Normal range of motion. She exhibits no edema.   Neurological: She is alert and oriented to person, place, and time.   Skin: Skin is warm and dry. No rash noted.   Nursing note and vitals reviewed.    Significant Labs:   CBC:  Recent Labs   Lab 03/03/20  1206   WBC 14.50*   HGB 10.3*   HCT 29.6*      GRAN 91.0*   LYMPH 6.0*  CANCELED   MONO 3.0*  CANCELED   EOS CANCELED   BASO CANCELED      CMP:  Recent Labs   Lab 03/03/20  1206      K 3.9      CO2 24   BUN 8   CREATININE 0.8   GLU 83   CALCIUM 9.5   ALKPHOS 161*   AST 45*   ALT 37   BILITOT 0.6   PROT 7.2   ALBUMIN 2.4*   ANIONGAP 14     UA:  Recent Labs   Lab 03/03/20  1245   PHUR 7.0   SPECGRAV 1.010   PROTEINUA 1+*   GLUCUA Negative   KETONESU Negative   BILIRUBINUA Negative   NITRITE Negative   RBCUA 3   WBCUA 8*   BACTERIA Few*   SQUAMEPITHEL 12   HYALINECASTS 0     Significant Imaging:   CXR 03/03/20:  Cardiac size is normal.  Interstitial markings in the lungs are mildly increased and this could represent acute or chronic interstitial infiltrate.    CT Head WO Contrast  03/03/20:  The subcutaneous tissues are unremarkable.  The bony calvarium is intact.  The paranasal sinuses are unremarkable.  The mastoid air cells are clear.  The orbits and intraorbital contents are within normal limits. The craniocervical junction is unremarkable.  The midline structures are within normal limits.  There are no extra-axial fluid collections.  There is no evidence of intracranial hemorrhage.  The ventricles and sulci are within normal limits.  The cisterns are unremarkable.  The gray-white differentiation is maintained.  There is no evidence of mass effect.    Assessment/Plan:     * Acute febrile illness  - Febrile illness with unclear etiology. Reported 9 day history with generalized symptoms; fever, chills, fatigue, nausea, vomiting, loose stool, headache, and dizziness.  - Differential broad at this time: influenza neg, strep neg. Low suspicion for C diff given frequency does not align and no prior history, CXR unremarkable, UA relatively unremarkable and prior urine culture no growth. No recent travel or significant sick contacts though older child is in . Had completed course of treatment for episiotomy dehiscence 5 weeks prior to presentation for Prevotella spp. With neurologic symptoms, meningitis/encephalitis possible, though bacterial seems unlikely given would expect much more severe without treatment for this duration.   - Discussed with ED physician, Dr. Barnhart; greatly appreciate assistance with obtaining LP. Will check CSF cell count and diff, protein, glucose, HSV as starters and freeze/hold CSF fluid.  - Post-LP likely initiate broad spectrum antibiotic therapy with ceftriaxone 2g IV q12hr, vancomycin IV; if LP labs suggestive of infection, may add ampicillin, acyclovir in addition.  - Will consult ID for further evaluation/management recommendations.      VTE Risk Mitigation (From admission, onward)         Ordered     enoxaparin injection 40 mg  Daily      03/03/20 8945      IP VTE HIGH RISK PATIENT  Once      03/03/20 1645     Place sequential compression device  Until discontinued      03/03/20 1645                   FAUSTO Saenz MD  Department of Hospital Medicine   [facility]

## 2020-03-03 NOTE — SUBJECTIVE & OBJECTIVE
Past Medical History:   Diagnosis Date    H/O elbow surgery     Oral contraceptive use        Past Surgical History:   Procedure Laterality Date    left elbow Left 03/30/2017    torn ligament       Review of patient's allergies indicates:   Allergen Reactions    No known drug allergies        No current facility-administered medications on file prior to encounter.      Current Outpatient Medications on File Prior to Encounter   Medication Sig    norethindrone-ethinyl estradiol-iron (ESTROSTEP FE) 1-20(5)/1-30(7) /1mg-35mcg (9) Tab Take by mouth once daily.    ondansetron (ZOFRAN-ODT) 4 MG TbDL Take 1 tablet (4 mg total) by mouth every 8 (eight) hours as needed.     Family History     Problem Relation (Age of Onset)    Breast cancer Other    Heart attack Maternal Grandfather    No Known Problems Maternal Grandmother, Sister, Paternal Grandmother, Paternal Grandfather    Skin cancer Other        Tobacco Use    Smoking status: Never Smoker    Smokeless tobacco: Never Used   Substance and Sexual Activity    Alcohol use: Yes    Drug use: No    Sexual activity: Yes     Partners: Male     Review of Systems   Constitutional: Positive for chills, diaphoresis, fatigue and fever.   HENT: Negative for sore throat and trouble swallowing.    Eyes: Negative for pain and visual disturbance.   Respiratory: Positive for cough. Negative for shortness of breath.    Cardiovascular: Negative for chest pain and palpitations.   Gastrointestinal: Positive for diarrhea, nausea and vomiting. Negative for abdominal pain.   Genitourinary: Negative for difficulty urinating and dysuria.   Musculoskeletal: Negative for arthralgias and myalgias.   Skin: Positive for rash. Negative for wound.   Neurological: Positive for dizziness and headaches. Negative for weakness and numbness.     Objective:     Vital Signs (Most Recent):  Temp: 99.7 °F (37.6 °C) (03/03/20 1430)  Pulse: 110 (03/03/20 1531)  Resp: (!) 8 (03/03/20 1531)  BP: 111/70  (03/03/20 1531)  SpO2: 98 % (03/03/20 1531) Vital Signs (24h Range):  Temp:  [99.7 °F (37.6 °C)-100.4 °F (38 °C)] 99.7 °F (37.6 °C)  Pulse:  [110-128] 110  Resp:  [8-26] 8  SpO2:  [98 %-100 %] 98 %  BP: (111-138)/(70-85) 111/70     Weight: 57.2 kg (126 lb)  Body mass index is 22.32 kg/m².    Physical Exam   Constitutional: She is oriented to person, place, and time. She appears well-developed. No distress.   HENT:   Head: Normocephalic and atraumatic.   No nuchal rigidity. No nystagmus noted on exam.   Eyes: Conjunctivae are normal. Right eye exhibits no discharge. Left eye exhibits no discharge.   Cardiovascular: Regular rhythm and intact distal pulses. Tachycardia present.   Pulmonary/Chest: Effort normal and breath sounds normal. No respiratory distress.   Abdominal: Soft. There is no tenderness.   Musculoskeletal: Normal range of motion. She exhibits no edema.   Neurological: She is alert and oriented to person, place, and time.   Skin: Skin is warm and dry. No rash noted.   Nursing note and vitals reviewed.    Significant Labs:   CBC:  Recent Labs   Lab 03/03/20  1206   WBC 14.50*   HGB 10.3*   HCT 29.6*      GRAN 91.0*   LYMPH 6.0*  CANCELED   MONO 3.0*  CANCELED   EOS CANCELED   BASO CANCELED      CMP:  Recent Labs   Lab 03/03/20  1206      K 3.9      CO2 24   BUN 8   CREATININE 0.8   GLU 83   CALCIUM 9.5   ALKPHOS 161*   AST 45*   ALT 37   BILITOT 0.6   PROT 7.2   ALBUMIN 2.4*   ANIONGAP 14     UA:  Recent Labs   Lab 03/03/20  1245   PHUR 7.0   SPECGRAV 1.010   PROTEINUA 1+*   GLUCUA Negative   KETONESU Negative   BILIRUBINUA Negative   NITRITE Negative   RBCUA 3   WBCUA 8*   BACTERIA Few*   SQUAMEPITHEL 12   HYALINECASTS 0     Significant Imaging:   CXR 03/03/20:  Cardiac size is normal.  Interstitial markings in the lungs are mildly increased and this could represent acute or chronic interstitial infiltrate.    CT Head WO Contrast 03/03/20:  The subcutaneous tissues are unremarkable.   The bony calvarium is intact.  The paranasal sinuses are unremarkable.  The mastoid air cells are clear.  The orbits and intraorbital contents are within normal limits. The craniocervical junction is unremarkable.  The midline structures are within normal limits.  There are no extra-axial fluid collections.  There is no evidence of intracranial hemorrhage.  The ventricles and sulci are within normal limits.  The cisterns are unremarkable.  The gray-white differentiation is maintained.  There is no evidence of mass effect.

## 2020-03-03 NOTE — ED PROVIDER NOTES
"Encounter Date: 3/3/2020    SCRIBE #1 NOTE: Karina PACHECO am scribing for, and in the presence of, Dr. Barnhart.       History     Chief Complaint   Patient presents with    Dizziness     Pt c/o intermittent fever, joint aches, V/D. Pt reports dizziness, fatigue, frontal H/A & spinning sensation this am. Pt evaluated earlier this week (-) flu & strep. Pt is 8 weeks post pardum, had infection at 2 weeks post pardum.     Time seen by provider: 11:23 AM    This is a 29 y.o. female who presents with complaint of headache that began last night. Headache is described as a pounding sensation with associated nausea and dizziness for which patient states "it feels like the room is spinning." She reports cough that began 2 days ago and neck pain. She denies any improvement of symptoms with use of tylenol, ibuprofen or aleve. She last took tylenol at 3 AM this morning. Patient has been experiencing flu-like symptoms over the last 9 days including fever, chills, diaphoresis, myalgias, nausea, vomiting, and diarrhea for which she has been evaluated multiple times with negative flu and strep swaps. She was last evaluated here in the ED 5 days ago, but denies having headache, dizziness, or cough at that time. She denies having any imaging done. Pt is 2 months post partum and had complications due to episiotomy which was treated with antibiotics. She finished the course of antibiotics over 5 weeks ago. She denies any redness or pain to site of episiotomy. Pt was started on birth control 1 month ago. Pt denies chest pain, abdominal pain, SOB, dysuria, hematuria, or blood in stool.    The history is provided by the patient.     Review of patient's allergies indicates:   Allergen Reactions    No known drug allergies      Past Medical History:   Diagnosis Date    H/O elbow surgery     Oral contraceptive use      Past Surgical History:   Procedure Laterality Date    left elbow Left 03/30/2017    torn ligament     Family History "   Problem Relation Age of Onset    No Known Problems Maternal Grandmother     No Known Problems Sister     Heart attack Maternal Grandfather     No Known Problems Paternal Grandmother     No Known Problems Paternal Grandfather     Skin cancer Other     Breast cancer Other     Colon cancer Neg Hx     Ovarian cancer Neg Hx      Social History     Tobacco Use    Smoking status: Never Smoker    Smokeless tobacco: Never Used   Substance Use Topics    Alcohol use: Yes    Drug use: No     Review of Systems   Constitutional: Positive for chills, diaphoresis and fever.   HENT: Negative for congestion, sore throat and trouble swallowing.    Eyes: Negative for photophobia and visual disturbance.   Respiratory: Positive for cough. Negative for shortness of breath.    Cardiovascular: Negative for chest pain.   Gastrointestinal: Positive for diarrhea, nausea and vomiting. Negative for abdominal pain and blood in stool.   Genitourinary: Negative for dysuria, hematuria and vaginal pain.   Musculoskeletal: Positive for myalgias and neck pain. Negative for back pain.   Skin: Negative for color change, rash and wound.   Neurological: Positive for dizziness and headaches. Negative for weakness.   Psychiatric/Behavioral: Negative.        Physical Exam     Initial Vitals [03/03/20 1022]   BP Pulse Resp Temp SpO2   124/85 (!) 128 18 (!) 100.4 °F (38 °C) 100 %      MAP       --         Physical Exam    Nursing note and vitals reviewed.  Constitutional: She appears well-developed and well-nourished. No distress.   Uncomfortable appearance. Warm to touch but not diaphoretic.   HENT:   Head: Normocephalic and atraumatic.   Right Ear: Tympanic membrane normal.   Left Ear: Tympanic membrane normal.   Mouth/Throat: Oropharynx is clear and moist.   Nose: Bilateral TM normal.  Mouth/Throat: Mucous membranes are dry. No oropharynx erythema or exudates.     Eyes: Conjunctivae and EOM are normal. Pupils are equal, round, and reactive to  light.   No pallor or icterus. No photophobia.   Neck: Normal range of motion. Neck supple.   No cervical lymphadenopathy. No meningismus.   Cardiovascular: Regular rhythm and normal heart sounds. Tachycardia present.  Exam reveals no gallop and no friction rub.    No murmur heard.  Pulmonary/Chest: Breath sounds normal. No respiratory distress. She has no wheezes. She has no rhonchi. She has no rales.   Abdominal: Soft. There is no tenderness. There is no rebound and no guarding.   Genitourinary:   Genitourinary Comments: External pelvic inspection performed with chaperone in room.  No erythema soft tissue swelling or other signs of infectious process in the perineal region.   Musculoskeletal: Normal range of motion. She exhibits no edema or tenderness.   Lymphadenopathy:     She has no cervical adenopathy.   Neurological: She is alert and oriented to person, place, and time. She has normal strength. No sensory deficit.   Skin: Skin is warm and dry. No rash noted.   Psychiatric: She has a normal mood and affect. Her behavior is normal. Judgment and thought content normal.         ED Course   Lumbar Puncture  Date/Time: 3/3/2020 4:59 PM  Location procedure was performed: [facility] [place] EMERGENCY DEPARTMENT  Performed by: Wiley Barnhart II, MD  Authorized by: Wiley Barnhart II, MD   Indications: evaluation for infection  Anesthesia: local infiltration    Anesthesia:  Local Anesthetic: lidocaine 1% without epinephrine  Patient sedated: no  Lumbar space: L4-L5 interspace  Patient's position: sitting  Needle gauge: 20  Tubes of fluid: 2  Post-procedure: site cleaned  Complications: No  Specimens: No  Implants: No  Patient tolerance: Patient tolerated the procedure well with no immediate complications  Comments: Fluid appearance: CSF obtained on 2nd attempt. Initial tube blood tinged. Blood cleared but fluid remained slightly cloudy, but not turbid.     Critical Care  Date/Time: 3/3/2020 8:24 PM  Performed by: Wiley ENRIQUE  Obey WELLINGTON MD  Authorized by: EMY Saenz MD   Direct patient critical care time: 40 minutes  Additional history critical care time: 5 minutes  Ordering / reviewing critical care time: 15 minutes  Documentation critical care time: 15 minutes  Consulting other physicians critical care time: 10 minutes  Total critical care time (exclusive of procedural time) : 85 minutes  Critical care time was exclusive of separately billable procedures and treating other patients.  Critical care was necessary to treat or prevent imminent or life-threatening deterioration of the following conditions: circulatory failure, sepsis, shock, dehydration and CNS failure or compromise.        Labs Reviewed   CBC W/ AUTO DIFFERENTIAL - Abnormal; Notable for the following components:       Result Value    WBC 14.50 (*)     RBC 3.60 (*)     Hemoglobin 10.3 (*)     Hematocrit 29.6 (*)     Gran% 91.0 (*)     Lymph% 6.0 (*)     Mono% 3.0 (*)     All other components within normal limits   COMPREHENSIVE METABOLIC PANEL - Abnormal; Notable for the following components:    Albumin 2.4 (*)     Alkaline Phosphatase 161 (*)     AST 45 (*)     All other components within normal limits   URINALYSIS, REFLEX TO URINE CULTURE - Abnormal; Notable for the following components:    Protein, UA 1+ (*)     Occult Blood UA Trace (*)     All other components within normal limits    Narrative:     Preferred Collection Type->Urine, Clean Catch   URINALYSIS MICROSCOPIC - Abnormal; Notable for the following components:    WBC, UA 8 (*)     Bacteria Few (*)     All other components within normal limits    Narrative:     Preferred Collection Type->Urine, Clean Catch   RESPIRATORY VIRAL PANEL BY PCR   LACTIC ACID, PLASMA   HERPES SIMPLEX (HSV) BY RAPID PCR, CSF   FREEZE AND HOLD -    POCT INFLUENZA A/B MOLECULAR     EKG Readings: (Independently Interpreted)   Initial Reading: No STEMI.   Sinus tachycardia at a rate of 125 bpm. No ischemia or arrhythmia.        Imaging Results          CT Head Without Contrast (Final result)  Result time 03/03/20 15:30:04    Final result by Rogelio Rizo MD (03/03/20 15:30:04)                 Impression:      No acute intracranial process.  MRI may be obtained for further evaluation.      Electronically signed by: Rogelio Rizo MD  Date:    03/03/2020  Time:    15:30             Narrative:    EXAMINATION:  CT HEAD WITHOUT CONTRAST    CLINICAL HISTORY:  Dizziness;Headache, acute, norm neuro exam;    TECHNIQUE:  Low dose axial images were obtained through the head.  Coronal and sagittal reformations were also performed. Contrast was not administered.    COMPARISON:  None.    FINDINGS:  The subcutaneous tissues are unremarkable.  The bony calvarium is intact.  The paranasal sinuses are unremarkable.  The mastoid air cells are clear.  The orbits and intraorbital contents are within normal limits.    The craniocervical junction is unremarkable.  The midline structures are within normal limits.  There are no extra-axial fluid collections.  There is no evidence of intracranial hemorrhage.  The ventricles and sulci are within normal limits.  The cisterns are unremarkable.  The gray-white differentiation is maintained.  There is no evidence of mass effect.                               X-Ray Chest PA And Lateral (Final result)  Result time 03/03/20 12:22:01    Final result by Stephen Keys MD (03/03/20 12:22:01)                 Impression:      See above      Electronically signed by: Stephen Keys MD  Date:    03/03/2020  Time:    12:22             Narrative:    EXAMINATION:  XR CHEST PA AND LATERAL    CLINICAL HISTORY:  Chest Pain;    TECHNIQUE:  PA and lateral views of the chest were performed.    COMPARISON:  None    FINDINGS:  Cardiac size is normal.  Interstitial markings in the lungs are mildly increased and this could represent acute or chronic interstitial infiltrate.                              X-Rays:   Independently  Interpreted Readings:   Chest X-Ray: No acute abnormalities. No infiltrate or effusions.     Medical Decision Making:   History:   Old Medical Records: I decided to obtain old medical records.  Independently Interpreted Test(s):   I have ordered and independently interpreted X-rays - see prior notes.  I have ordered and independently interpreted EKG Reading(s) - see prior notes  Clinical Tests:   Lab Tests: Ordered and Reviewed  Radiological Study: Ordered and Reviewed  Medical Tests: Ordered and Reviewed            Scribe Attestation:   Scribe #1: I performed the above scribed service and the documentation accurately describes the services I performed. I attest to the accuracy of the note.    Attending Attestation:           Physician Attestation for Scribe:  Physician Attestation Statement for Scribe #1: I, Dr. Barnhart, reviewed documentation, as scribed by Karina Rankin in my presence, and it is both accurate and complete.                 ED Course as of Mar 03 2025   Tue Mar 03, 2020   1403 Discussed case with Dr. Bowens or hospital service, will admit to Dr. Saenz.    [LT]   1404 Dr. Smith of OBGYN called as she had heard the patient was in the ER. Discussed current symptoms, recent delivery with episiotomy. Does not think GYN related, agrees with plan for admission to hospital service.    [LT]   1612 Patient feels and appears better. Heart rate 105 to 110. States headache nearly resolved. Hospital service request LP, risks and benefits discussed with patient and family members.    [LT]      ED Course User Index  [LT] Karina Rankin          Otherwise healthy female presents complaining of persistent symptoms for the past week and a half, including headaches fever chills myalgias malaise vomiting diarrhea etc.  This is her 4th evaluation by various providers, reports multiple strep and flu swabs negative. On initial exam she is dehydrated appearing, tachycardic and uncomfortable appearing but she does not have  meningismus or photophobia.  She did have some rhonchi at the lung bases howeverver chest x-ray does not show evidence of pneumonia.  Laboratory studies do show elevated white count with left shift, normal electrolytes and renal function. Urinalysis does not suggest UTI. because of the current high prevalence of influenza we did retest her for this, remains negative.  Despite over 2 L of fluids, antipyretics the patient remains tachycardic 120s and still appears uncomfortable I therefore feel that she requires admission to the hospitalist's service for further observation for fever control fluids and monitoring for bacteremia. lactic acid is negative. Blood cultures pending.  after evaluation by the hospitalist service they are requesting a lumbar puncture to evaluate for the possibility of viral meningitis.  Clinically bacterial meningitis felt to be unlikely given the time course of greater than a week, lack of meningismus, altered mental status or other neurologic symptoms.  The hospitalist service will initiate antibiotic coverage after the LP.  Of note at the time of the LP was performed of the patient's heart rate is now significantly improved (105) and the patient is now comfortable appearing, smiling, and feels much better.  Updated the patient and family members with findings and plan of care to this date.  Discussed risks/benefits of LP.  Patient asked for  to sign the consent for her.  Will be transferred to inpatient unit after LP is performed      Clinical Impression:     1. Acute febrile illness    2. Dizziness    3. Dehydration              ED Disposition Condition    Admit                           Wiley Barnhart II, MD  03/03/20 2032       Wiley Barnhart II, MD  03/03/20 2103

## 2020-03-03 NOTE — ED NOTES
30y/o F to ED for dizziness. Pt has been having N/V/D off and on x9 days with fevers and headache. Pt 2 months post partum. No breastfeeding. Pt has been seen here 5 days ago for possible flu. Results negative. Pt has not improved. Today dizziness and weakness are significantly worse.

## 2020-03-04 PROBLEM — G03.9 MENINGITIS: Status: ACTIVE | Noted: 2020-03-03

## 2020-03-04 LAB
ALBUMIN SERPL BCP-MCNC: 1.7 G/DL (ref 3.5–5.2)
ALP SERPL-CCNC: 98 U/L (ref 55–135)
ALT SERPL W/O P-5'-P-CCNC: 23 U/L (ref 10–44)
ANION GAP SERPL CALC-SCNC: 9 MMOL/L (ref 8–16)
AST SERPL-CCNC: 20 U/L (ref 10–40)
BASOPHILS # BLD AUTO: 0.02 K/UL (ref 0–0.2)
BASOPHILS NFR BLD: 0.2 % (ref 0–1.9)
BILIRUB SERPL-MCNC: 0.2 MG/DL (ref 0.1–1)
BUN SERPL-MCNC: 6 MG/DL (ref 6–20)
CALCIUM SERPL-MCNC: 7.9 MG/DL (ref 8.7–10.5)
CHLORIDE SERPL-SCNC: 110 MMOL/L (ref 95–110)
CO2 SERPL-SCNC: 23 MMOL/L (ref 23–29)
CREAT SERPL-MCNC: 0.7 MG/DL (ref 0.5–1.4)
DIFFERENTIAL METHOD: ABNORMAL
EOSINOPHIL # BLD AUTO: 0 K/UL (ref 0–0.5)
EOSINOPHIL NFR BLD: 0.3 % (ref 0–8)
ERYTHROCYTE [DISTWIDTH] IN BLOOD BY AUTOMATED COUNT: 12 % (ref 11.5–14.5)
EST. GFR  (AFRICAN AMERICAN): >60 ML/MIN/1.73 M^2
EST. GFR  (NON AFRICAN AMERICAN): >60 ML/MIN/1.73 M^2
GLUCOSE SERPL-MCNC: 101 MG/DL (ref 70–110)
HCT VFR BLD AUTO: 24.5 % (ref 37–48.5)
HGB BLD-MCNC: 8.5 G/DL (ref 12–16)
IMM GRANULOCYTES # BLD AUTO: 0.16 K/UL (ref 0–0.04)
IMM GRANULOCYTES NFR BLD AUTO: 1.6 % (ref 0–0.5)
LYMPHOCYTES # BLD AUTO: 0.9 K/UL (ref 1–4.8)
LYMPHOCYTES NFR BLD: 8.8 % (ref 18–48)
MAGNESIUM SERPL-MCNC: 1.6 MG/DL (ref 1.6–2.6)
MCH RBC QN AUTO: 28.5 PG (ref 27–31)
MCHC RBC AUTO-ENTMCNC: 34.7 G/DL (ref 32–36)
MCV RBC AUTO: 82 FL (ref 82–98)
MONOCYTES # BLD AUTO: 0.4 K/UL (ref 0.3–1)
MONOCYTES NFR BLD: 3.9 % (ref 4–15)
NEUTROPHILS # BLD AUTO: 8.3 K/UL (ref 1.8–7.7)
NEUTROPHILS NFR BLD: 85.2 % (ref 38–73)
NRBC BLD-RTO: 0 /100 WBC
PHOSPHATE SERPL-MCNC: 3 MG/DL (ref 2.7–4.5)
PLATELET # BLD AUTO: 221 K/UL (ref 150–350)
PMV BLD AUTO: 9.7 FL (ref 9.2–12.9)
POTASSIUM SERPL-SCNC: 2.8 MMOL/L (ref 3.5–5.1)
PROCALCITONIN SERPL IA-MCNC: 0.3 NG/ML
PROT SERPL-MCNC: 4.9 G/DL (ref 6–8.4)
RBC # BLD AUTO: 2.98 M/UL (ref 4–5.4)
SODIUM SERPL-SCNC: 142 MMOL/L (ref 136–145)
WBC # BLD AUTO: 9.79 K/UL (ref 3.9–12.7)

## 2020-03-04 PROCEDURE — 80053 COMPREHEN METABOLIC PANEL: CPT

## 2020-03-04 PROCEDURE — 94761 N-INVAS EAR/PLS OXIMETRY MLT: CPT

## 2020-03-04 PROCEDURE — 25000003 PHARM REV CODE 250: Performed by: PHYSICIAN ASSISTANT

## 2020-03-04 PROCEDURE — 85025 COMPLETE CBC W/AUTO DIFF WBC: CPT

## 2020-03-04 PROCEDURE — 25000003 PHARM REV CODE 250: Performed by: INTERNAL MEDICINE

## 2020-03-04 PROCEDURE — 99233 PR SUBSEQUENT HOSPITAL CARE,LEVL III: ICD-10-PCS | Mod: ,,, | Performed by: INTERNAL MEDICINE

## 2020-03-04 PROCEDURE — 11000001 HC ACUTE MED/SURG PRIVATE ROOM

## 2020-03-04 PROCEDURE — 36415 COLL VENOUS BLD VENIPUNCTURE: CPT

## 2020-03-04 PROCEDURE — 99233 SBSQ HOSP IP/OBS HIGH 50: CPT | Mod: ,,, | Performed by: INTERNAL MEDICINE

## 2020-03-04 PROCEDURE — 87899 AGENT NOS ASSAY W/OPTIC: CPT

## 2020-03-04 PROCEDURE — 99223 1ST HOSP IP/OBS HIGH 75: CPT | Mod: ,,, | Performed by: INTERNAL MEDICINE

## 2020-03-04 PROCEDURE — 63600175 PHARM REV CODE 636 W HCPCS: Performed by: PHYSICIAN ASSISTANT

## 2020-03-04 PROCEDURE — 83735 ASSAY OF MAGNESIUM: CPT

## 2020-03-04 PROCEDURE — 84145 PROCALCITONIN (PCT): CPT

## 2020-03-04 PROCEDURE — 99223 PR INITIAL HOSPITAL CARE,LEVL III: ICD-10-PCS | Mod: ,,, | Performed by: INTERNAL MEDICINE

## 2020-03-04 PROCEDURE — 84100 ASSAY OF PHOSPHORUS: CPT

## 2020-03-04 PROCEDURE — 63600175 PHARM REV CODE 636 W HCPCS: Performed by: INTERNAL MEDICINE

## 2020-03-04 RX ORDER — ONDANSETRON 4 MG/1
4 TABLET, ORALLY DISINTEGRATING ORAL EVERY 8 HOURS PRN
Status: DISCONTINUED | OUTPATIENT
Start: 2020-03-04 | End: 2020-03-11 | Stop reason: HOSPADM

## 2020-03-04 RX ORDER — BUTALBITAL, ACETAMINOPHEN AND CAFFEINE 50; 325; 40 MG/1; MG/1; MG/1
1 TABLET ORAL EVERY 6 HOURS PRN
Status: DISCONTINUED | OUTPATIENT
Start: 2020-03-04 | End: 2020-03-11 | Stop reason: HOSPADM

## 2020-03-04 RX ORDER — MAGNESIUM SULFATE HEPTAHYDRATE 40 MG/ML
2 INJECTION, SOLUTION INTRAVENOUS ONCE
Status: COMPLETED | OUTPATIENT
Start: 2020-03-04 | End: 2020-03-04

## 2020-03-04 RX ORDER — POTASSIUM CHLORIDE 20 MEQ/15ML
40 SOLUTION ORAL
Status: COMPLETED | OUTPATIENT
Start: 2020-03-04 | End: 2020-03-04

## 2020-03-04 RX ORDER — BUTALBITAL, ACETAMINOPHEN AND CAFFEINE 50; 325; 40 MG/1; MG/1; MG/1
2 TABLET ORAL ONCE
Status: COMPLETED | OUTPATIENT
Start: 2020-03-04 | End: 2020-03-04

## 2020-03-04 RX ORDER — ONDANSETRON 2 MG/ML
4 INJECTION INTRAMUSCULAR; INTRAVENOUS EVERY 8 HOURS PRN
Status: DISCONTINUED | OUTPATIENT
Start: 2020-03-04 | End: 2020-03-11 | Stop reason: HOSPADM

## 2020-03-04 RX ADMIN — POTASSIUM CHLORIDE 40 MEQ: 40 SOLUTION ORAL at 08:03

## 2020-03-04 RX ADMIN — ACYCLOVIR SODIUM 520 MG: 1000 INJECTION, SOLUTION INTRAVENOUS at 12:03

## 2020-03-04 RX ADMIN — BUTALBITAL, ACETAMINOPHEN AND CAFFEINE 1 TABLET: 50; 325; 40 TABLET ORAL at 03:03

## 2020-03-04 RX ADMIN — AMPICILLIN SODIUM 2 G: 2 INJECTION, POWDER, FOR SOLUTION INTRAVENOUS at 05:03

## 2020-03-04 RX ADMIN — PROMETHAZINE HYDROCHLORIDE 25 MG: 25 INJECTION INTRAMUSCULAR; INTRAVENOUS at 12:03

## 2020-03-04 RX ADMIN — ACYCLOVIR SODIUM 520 MG: 1000 INJECTION, SOLUTION INTRAVENOUS at 03:03

## 2020-03-04 RX ADMIN — CEFTRIAXONE 2 G: 2 INJECTION, SOLUTION INTRAVENOUS at 08:03

## 2020-03-04 RX ADMIN — ACETAMINOPHEN 650 MG: 325 TABLET ORAL at 12:03

## 2020-03-04 RX ADMIN — AMPICILLIN SODIUM 2 G: 2 INJECTION, POWDER, FOR SOLUTION INTRAVENOUS at 02:03

## 2020-03-04 RX ADMIN — AMPICILLIN SODIUM 2 G: 2 INJECTION, POWDER, FOR SOLUTION INTRAVENOUS at 10:03

## 2020-03-04 RX ADMIN — VANCOMYCIN HYDROCHLORIDE 750 MG: 750 INJECTION, POWDER, LYOPHILIZED, FOR SOLUTION INTRAVENOUS at 07:03

## 2020-03-04 RX ADMIN — ENOXAPARIN SODIUM 40 MG: 100 INJECTION SUBCUTANEOUS at 05:03

## 2020-03-04 RX ADMIN — CEFTRIAXONE 2 G: 2 INJECTION, SOLUTION INTRAVENOUS at 10:03

## 2020-03-04 RX ADMIN — VANCOMYCIN HYDROCHLORIDE 750 MG: 750 INJECTION, POWDER, LYOPHILIZED, FOR SOLUTION INTRAVENOUS at 05:03

## 2020-03-04 RX ADMIN — AMPICILLIN SODIUM 2 G: 2 INJECTION, POWDER, FOR SOLUTION INTRAVENOUS at 09:03

## 2020-03-04 RX ADMIN — POTASSIUM CHLORIDE 40 MEQ: 40 SOLUTION ORAL at 07:03

## 2020-03-04 RX ADMIN — ACYCLOVIR SODIUM 520 MG: 1000 INJECTION, SOLUTION INTRAVENOUS at 09:03

## 2020-03-04 RX ADMIN — MAGNESIUM SULFATE IN WATER 2 G: 40 INJECTION, SOLUTION INTRAVENOUS at 02:03

## 2020-03-04 RX ADMIN — BUTALBITAL, ACETAMINOPHEN AND CAFFEINE 2 TABLET: 50; 325; 40 TABLET ORAL at 03:03

## 2020-03-04 NOTE — ASSESSMENT & PLAN NOTE
- Mild; unclear etiology. Improved with IVFs. Some reported abdominal discomfort 03/04.  - Check U/S Abd Limited to further evaluate.

## 2020-03-04 NOTE — HPI
29F with h/o vaginal delivery on 1/03/20 c/b episiotomy admitted overnight with 9 days of fever tmax 102, bodyaches/shaking chills. Says she ultimately came to hospital because of severe headache and sensation of dizziness and blurry vision. Also reports loose stools and abdominal pain during this time period. Over past 3d reports dry cough and feeling short winded. Says she noticed fine rash on bilatearl thighs and abdomen when she took shower prior to arrival. Reports having taken 2 rounds of antibiotics recently for what she says was a skin infection from her episiotomy (thinks she was given clindamycin; also note in chart for metronidazole and augmentin), but denies recent abx use and says surgical scar now healed. Denies sick contacts.

## 2020-03-04 NOTE — SUBJECTIVE & OBJECTIVE
Review of Systems   Constitutional: Positive for fever. Negative for chills.   Respiratory: Negative for cough and shortness of breath.    Cardiovascular: Negative for chest pain and palpitations.   Gastrointestinal: Positive for abdominal distention and abdominal pain. Negative for nausea and vomiting.     Objective:     Vital Signs (Most Recent):  Temp: 98.7 °F (37.1 °C) (03/04/20 1146)  Pulse: (!) 113 (03/04/20 1400)  Resp: 17 (03/04/20 1146)  BP: 113/70 (03/04/20 1146)  SpO2: 98 % (03/04/20 1146) Vital Signs (24h Range):  Temp:  [97.8 °F (36.6 °C)-100.3 °F (37.9 °C)] 98.7 °F (37.1 °C)  Pulse:  [] 113  Resp:  [17-24] 17  SpO2:  [94 %-100 %] 98 %  BP: (110-124)/(70-80) 113/70     Weight: 57.2 kg (126 lb)  Body mass index is 22.32 kg/m².  No intake or output data in the 24 hours ending 03/04/20 1607     Physical Exam   Constitutional: She is oriented to person, place, and time. She appears well-developed. No distress.   HENT:   Head: Normocephalic and atraumatic.   Eyes: Conjunctivae are normal. Right eye exhibits no discharge. Left eye exhibits no discharge.   Cardiovascular: Normal rate and intact distal pulses.   Pulmonary/Chest: Effort normal. No respiratory distress.   Abdominal: Soft. Bowel sounds are normal. She exhibits distension. There is tenderness (RUQ).   Musculoskeletal: Normal range of motion. She exhibits no edema.   Neurological: She is alert and oriented to person, place, and time.   Skin: Skin is warm and dry.   Nursing note and vitals reviewed.    Significant Labs:   CBC:  Recent Labs   Lab 03/03/20  1206 03/04/20  0452   WBC 14.50* 9.79   HGB 10.3* 8.5*   HCT 29.6* 24.5*    221   GRAN 91.0* 85.2*  8.3*   LYMPH 6.0*  CANCELED 8.8*  0.9*   MONO 3.0*  CANCELED 3.9*  0.4   EOS CANCELED 0.0   BASO CANCELED 0.02      CMP:  Recent Labs   Lab 03/03/20  1206 03/04/20  0452    142   K 3.9 2.8*    110   CO2 24 23   BUN 8 6   CREATININE 0.8 0.7   GLU 83 101   CALCIUM 9.5  7.9*   MG  --  1.6   PHOS  --  3.0   ALKPHOS 161* 98   AST 45* 20   ALT 37 23   BILITOT 0.6 0.2   PROT 7.2 4.9*   ALBUMIN 2.4* 1.7*   ANIONGAP 14 9     Recent Labs   Lab 03/03/20  1655   APPEARCSF Slightly hazy*   CSFWBC 225*   CSFRBC 24*   SEG 68*   LYMPHS 4*   MONOMACCSF 28   GLUCCSF 39*   PROTEINCSF 174*     Significant Imaging:   No new imaging this morning.

## 2020-03-04 NOTE — PROGRESS NOTES
Pt transferred from ED via stretcher AAOX4. VSS on RA. Pt denies pain at this time. NaCl infusing at 100ml/hr to 20g R AC. Cardiac monitoring and SCDs applied. Room orientation given. Regular diet meal tray ordered. Pt ambulates and repositions self independently.  Pt has call light in reach, side rails up X2, bed in low position and nonskid socks on. Pt lying in bed in no distress w/ spouse at the bedside. Will continue to monitor.

## 2020-03-04 NOTE — ASSESSMENT & PLAN NOTE
29F with h/o vaginal delivery on 1/03/20 c/b episiotomy admitted overnight with 9d of fever/rigors and acute severe headache/dizziness. Also reporting rash, diarrhea. She has a picture on her phone of a peticial rash from several days ago, but it has resolved on exam.  Episiotomy wound reportedly healing. She is not breast feeding. Labs with leukocytosis and mild transaminitis and elevated procalcitonin on arrival. Flu neg.  LP done and elevated  (68% segs, 4% lymph), elevated protein 174, low glucose 39 (ratio 0.47). Cultures and hsv and wnv pending. hiv negative. Currently on vanc/ceftriaxone/ampicillin/acyclovir    Csf studies c/f bacterial cause of meningitis, but time course and concurrent gastroenteritis also raises suspicion for viral process. Agree with current antibiotics. Doubt HSV and if HSV PCR negative would stop acyclovir. Added on CSF PCR panel and would stop amp if listeria neg on panel. Sent stool studies. F/u resp viral pcr panel. If cultures and pcr and work up neg, my tentative plan is for 10 days total of IV vanc/ceftriaxone. Final recs pending work up. F/u abdominal imaging for w/u of abd pain

## 2020-03-04 NOTE — ASSESSMENT & PLAN NOTE
- Hgb mildly low since 10/2019. No evidence of acute bleed.  - Check iron studies, retic, B12, folate to further evaluate.

## 2020-03-04 NOTE — SUBJECTIVE & OBJECTIVE
Interval History: Tmax 100.3F overnight. Had headache and reports some abdominal discomfort/distention this morning. Feeling slightly improved from prior.    Review of Systems   Constitutional: Positive for fever. Negative for chills.   Respiratory: Negative for cough and shortness of breath.    Cardiovascular: Negative for chest pain and palpitations.   Gastrointestinal: Positive for abdominal distention and abdominal pain. Negative for nausea and vomiting.     Objective:     Vital Signs (Most Recent):  Temp: 98.7 °F (37.1 °C) (03/04/20 1146)  Pulse: (!) 113 (03/04/20 1400)  Resp: 17 (03/04/20 1146)  BP: 113/70 (03/04/20 1146)  SpO2: 98 % (03/04/20 1146) Vital Signs (24h Range):  Temp:  [97.8 °F (36.6 °C)-100.3 °F (37.9 °C)] 98.7 °F (37.1 °C)  Pulse:  [] 113  Resp:  [8-24] 17  SpO2:  [94 %-100 %] 98 %  BP: (110-124)/(70-80) 113/70     Weight: 57.2 kg (126 lb)  Body mass index is 22.32 kg/m².  No intake or output data in the 24 hours ending 03/04/20 1458     Physical Exam   Constitutional: She is oriented to person, place, and time. She appears well-developed. No distress.   HENT:   Head: Normocephalic and atraumatic.   Eyes: Conjunctivae are normal. Right eye exhibits no discharge. Left eye exhibits no discharge.   Cardiovascular: Normal rate and intact distal pulses.   Pulmonary/Chest: Effort normal. No respiratory distress.   Abdominal: Soft. Bowel sounds are normal. She exhibits distension. There is tenderness (RUQ).   Musculoskeletal: Normal range of motion. She exhibits no edema.   Neurological: She is alert and oriented to person, place, and time.   Skin: Skin is warm and dry.   Nursing note and vitals reviewed.    Significant Labs:   CBC:  Recent Labs   Lab 03/03/20  1206 03/04/20  0452   WBC 14.50* 9.79   HGB 10.3* 8.5*   HCT 29.6* 24.5*    221   GRAN 91.0* 85.2*  8.3*   LYMPH 6.0*  CANCELED 8.8*  0.9*   MONO 3.0*  CANCELED 3.9*  0.4   EOS CANCELED 0.0   BASO CANCELED 0.02       CMP:  Recent Labs   Lab 03/03/20  1206 03/04/20  0452    142   K 3.9 2.8*    110   CO2 24 23   BUN 8 6   CREATININE 0.8 0.7   GLU 83 101   CALCIUM 9.5 7.9*   MG  --  1.6   PHOS  --  3.0   ALKPHOS 161* 98   AST 45* 20   ALT 37 23   BILITOT 0.6 0.2   PROT 7.2 4.9*   ALBUMIN 2.4* 1.7*   ANIONGAP 14 9     Recent Labs   Lab 03/03/20  1655   APPEARCSF Slightly hazy*   CSFWBC 225*   CSFRBC 24*   SEG 68*   LYMPHS 4*   MONOMACCSF 28   GLUCCSF 39*   PROTEINCSF 174*     Significant Imaging:   No new imaging this morning.

## 2020-03-04 NOTE — PLAN OF CARE
SW met with pt to complete discharge assessment, verified PCP and pt uses [facility] in [facility] and would like bedside delivery.  Pt lives with spouse and children, ages 5 and 8weeks.  Pt's spouse, Ryan [phone] will provide transportation home.  Pt doesn't have a POA or LW.  No needs identified at this time.     03/04/20 0949   Discharge Assessment   Assessment Type Discharge Planning Assessment   Confirmed/corrected address and phone number on facesheet? Yes   Assessment information obtained from? Patient;Caregiver   Communicated expected length of stay with patient/caregiver no   Prior to hospitilization cognitive status: Alert/Oriented   Prior to hospitalization functional status: Independent   Current cognitive status: Alert/Oriented   Current Functional Status: Independent   Lives With spouse;child(luciano), dependent   Able to Return to Prior Arrangements yes   Is patient able to care for self after discharge? Yes   Readmission Within the Last 30 Days no previous admission in last 30 days   Patient currently being followed by outpatient case management? No   Patient currently receives any other outside agency services? No   Equipment Currently Used at Home none   Do you have any problems affording any of your prescribed medications? No   Is the patient taking medications as prescribed? yes   Does the patient have transportation home? Yes   Transportation Anticipated family or friend will provide   Does the patient receive services at the [facility]? No   Discharge Plan A Home   DME Needed Upon Discharge  none   Patient/Family in Agreement with Plan yes

## 2020-03-04 NOTE — ASSESSMENT & PLAN NOTE
- Meningitis with LP 03/03 demonstrating significant elevation in WBCs, low glucose, and elevated protein.  - Continuing broad coverage with acyclovir 10mg/kg IV q8hr, ampicillin 2g IV q4hr, ceftriaxone 2g IV q12hr, vancomycin IV with pharmacy dosing assistance.  - Labs more consistent with bacterial though given duration, feel that viral may be more likely; will discuss with ID.  - CSF culture, HSV pending; patient/family live in [place], potentially WNV?  - ID consulted; appreciate assistance.

## 2020-03-04 NOTE — PLAN OF CARE
Pt remains free from falls. Vitals were stable throughout the night on room air. Positions self independently. Pain managed with PO medications,complaints of nausea managed with IV medication. Bed in low position and call light within reach. Will continue to monitor.

## 2020-03-04 NOTE — CONSULTS
[facility]  Infectious Disease  Consult Note    Patient Name: Melinda Parekh  MRN: [id number]  Admission Date: 3/3/2020  Hospital Length of Stay: 1 days  Attending Physician: EMY Saenz MD  Primary Care Provider: Maicol Jensen MD     Isolation Status: No active isolations    Patient information was obtained from patient and ER records.      Inpatient consult to Infectious Diseases  Consult performed by: Denise Duke MD  Consult ordered by: EMY Saenz MD        Assessment/Plan:     * Meningitis  29F with h/o vaginal delivery on 1/03/20 c/b episiotomy admitted overnight with 9d of fever/rigors and acute severe headache/dizziness. Also reporting rash, diarrhea. She has a picture on her phone of a peticial rash from several days ago, but it has resolved on exam.  Episiotomy wound reportedly healing. She is not breast feeding. Labs with leukocytosis and mild transaminitis and elevated procalcitonin on arrival. Flu neg.  LP done and elevated  (68% segs, 4% lymph), elevated protein 174, low glucose 39 (ratio 0.47). Cultures and hsv and wnv pending. hiv negative. Currently on vanc/ceftriaxone/ampicillin/acyclovir    Csf studies c/f bacterial cause of meningitis, but time course and concurrent gastroenteritis also raises suspicion for viral process. Agree with current antibiotics. Doubt HSV and if HSV PCR negative would stop acyclovir. Added on CSF PCR panel and would stop amp if listeria neg on panel. Sent stool studies. F/u resp viral pcr panel. If cultures and pcr and work up neg, my tentative plan is for 10 days total of IV vanc/ceftriaxone. Final recs pending work up. F/u abdominal imaging for w/u of abd pain            Thank you for your consult. I will follow-up with patient. Please contact us if you have any additional questions.    Denise uDke MD  Infectious Disease  [facility]    Subjective:     Principal Problem: Meningitis    HPI: 29F with h/o  vaginal delivery on 1/03/20 c/b episiotomy admitted overnight with 9 days of fever tmax 102, bodyaches/shaking chills. Says she ultimately came to hospital because of severe headache and sensation of dizziness and blurry vision. Also reports loose stools and abdominal pain during this time period. Over past 3d reports dry cough and feeling short winded. Says she noticed fine rash on bilatearl thighs and abdomen when she took shower prior to arrival. Reports having taken 2 rounds of antibiotics recently for what she says was a skin infection from her episiotomy (thinks she was given clindamycin; also note in chart for metronidazole and augmentin), but denies recent abx use and says surgical scar now healed. Denies sick contacts.               Review of Systems   Constitutional: Positive for fever. Negative for chills.   Respiratory: Negative for cough and shortness of breath.    Cardiovascular: Negative for chest pain and palpitations.   Gastrointestinal: Positive for abdominal distention and abdominal pain. Negative for nausea and vomiting.     Objective:     Vital Signs (Most Recent):  Temp: 98.7 °F (37.1 °C) (03/04/20 1146)  Pulse: (!) 113 (03/04/20 1400)  Resp: 17 (03/04/20 1146)  BP: 113/70 (03/04/20 1146)  SpO2: 98 % (03/04/20 1146) Vital Signs (24h Range):  Temp:  [97.8 °F (36.6 °C)-100.3 °F (37.9 °C)] 98.7 °F (37.1 °C)  Pulse:  [] 113  Resp:  [17-24] 17  SpO2:  [94 %-100 %] 98 %  BP: (110-124)/(70-80) 113/70     Weight: 57.2 kg (126 lb)  Body mass index is 22.32 kg/m².  No intake or output data in the 24 hours ending 03/04/20 1607     Physical Exam   Constitutional: She is oriented to person, place, and time. She appears well-developed. No distress.   HENT:   Head: Normocephalic and atraumatic.   Eyes: Conjunctivae are normal. Right eye exhibits no discharge. Left eye exhibits no discharge.   Cardiovascular: Normal rate and intact distal pulses.   Pulmonary/Chest: Effort normal. No respiratory distress.    Abdominal: Soft. Bowel sounds are normal. She exhibits distension. There is tenderness (RUQ).   Musculoskeletal: Normal range of motion. She exhibits no edema.   Neurological: She is alert and oriented to person, place, and time.   Skin: Skin is warm and dry.   Nursing note and vitals reviewed.    Significant Labs:   CBC:  Recent Labs   Lab 03/03/20  1206 03/04/20  0452   WBC 14.50* 9.79   HGB 10.3* 8.5*   HCT 29.6* 24.5*    221   GRAN 91.0* 85.2*  8.3*   LYMPH 6.0*  CANCELED 8.8*  0.9*   MONO 3.0*  CANCELED 3.9*  0.4   EOS CANCELED 0.0   BASO CANCELED 0.02      CMP:  Recent Labs   Lab 03/03/20  1206 03/04/20  0452    142   K 3.9 2.8*    110   CO2 24 23   BUN 8 6   CREATININE 0.8 0.7   GLU 83 101   CALCIUM 9.5 7.9*   MG  --  1.6   PHOS  --  3.0   ALKPHOS 161* 98   AST 45* 20   ALT 37 23   BILITOT 0.6 0.2   PROT 7.2 4.9*   ALBUMIN 2.4* 1.7*   ANIONGAP 14 9     Recent Labs   Lab 03/03/20  1655   APPEARCSF Slightly hazy*   CSFWBC 225*   CSFRBC 24*   SEG 68*   LYMPHS 4*   MONOMACCSF 28   GLUCCSF 39*   PROTEINCSF 174*     Significant Imaging:   No new imaging this morning.

## 2020-03-04 NOTE — PROGRESS NOTES
[facility] Hospital Medicine  Progress Note    Patient Name: Melinda Parekh  MRN: [id number]  Patient Class: IP- Inpatient   Admission Date: 3/3/2020  Length of Stay: 1 days  Attending Physician: EMY Saenz MD  Primary Care Provider: Maicol Jensen MD    Subjective:     Principal Problem:Meningitis    HPI:  Ms. Parekh is a 29/F with PMH significant for recent vaginal delivery 01/03/20 with episiotomy, subsequent infection with prevotella spp. s/p treatment with amoxicillin-clavulanate and metronidazole who presented to ED 03/03 with a reported 9 day history of illness. She reports symptoms began with diffuse body aches, fever, chills, and fatigue. Tmax at home has been up to 102F. Subsequently developed diarrhea with two episodes daily. As symptoms persisted she visited her PCP 02/26; rapid flu and strep were negative and she was presumed to have gastroenteritis and was treated symptomatically. With progressive body aches presented to ED 02/27 and had nausea/vomiting in addition. Evaluation at that time was notable for no significant leukocytosis, equivocal UA (urine culture no growth), no CK elevation, tachycardia that resolved with fluids and she was subsequently discharged home; empiric antibiotic therapy was held at that time. Upon return home her symptoms continued to worsen. She then developed dizziness, visual disturbance and headache; denied neck pain or stiffness. She subsequently presented to ED 03/03 for further evaluation. ED workup was notable for leukocytosis, Tmax 100.4F, mild transaminitis. Hospital medicine was contacted for admission.    Overview/Hospital Course:  No notes on file    Interval History: Tmax 100.3F overnight. Had headache and reports some abdominal discomfort/distention this morning. Feeling slightly improved from prior.    Review of Systems   Constitutional: Positive for fever. Negative for chills.   Respiratory: Negative for cough and shortness of breath.     Cardiovascular: Negative for chest pain and palpitations.   Gastrointestinal: Positive for abdominal distention and abdominal pain. Negative for nausea and vomiting.     Objective:     Vital Signs (Most Recent):  Temp: 98.7 °F (37.1 °C) (03/04/20 1146)  Pulse: (!) 113 (03/04/20 1400)  Resp: 17 (03/04/20 1146)  BP: 113/70 (03/04/20 1146)  SpO2: 98 % (03/04/20 1146) Vital Signs (24h Range):  Temp:  [97.8 °F (36.6 °C)-100.3 °F (37.9 °C)] 98.7 °F (37.1 °C)  Pulse:  [] 113  Resp:  [8-24] 17  SpO2:  [94 %-100 %] 98 %  BP: (110-124)/(70-80) 113/70     Weight: 57.2 kg (126 lb)  Body mass index is 22.32 kg/m².  No intake or output data in the 24 hours ending 03/04/20 1458     Physical Exam   Constitutional: She is oriented to person, place, and time. She appears well-developed. No distress.   HENT:   Head: Normocephalic and atraumatic.   Eyes: Conjunctivae are normal. Right eye exhibits no discharge. Left eye exhibits no discharge.   Cardiovascular: Normal rate and intact distal pulses.   Pulmonary/Chest: Effort normal. No respiratory distress.   Abdominal: Soft. Bowel sounds are normal. She exhibits distension. There is tenderness (RUQ).   Musculoskeletal: Normal range of motion. She exhibits no edema.   Neurological: She is alert and oriented to person, place, and time.   Skin: Skin is warm and dry.   Nursing note and vitals reviewed.    Significant Labs:   CBC:  Recent Labs   Lab 03/03/20  1206 03/04/20  0452   WBC 14.50* 9.79   HGB 10.3* 8.5*   HCT 29.6* 24.5*    221   GRAN 91.0* 85.2*  8.3*   LYMPH 6.0*  CANCELED 8.8*  0.9*   MONO 3.0*  CANCELED 3.9*  0.4   EOS CANCELED 0.0   BASO CANCELED 0.02      CMP:  Recent Labs   Lab 03/03/20  1206 03/04/20  0452    142   K 3.9 2.8*    110   CO2 24 23   BUN 8 6   CREATININE 0.8 0.7   GLU 83 101   CALCIUM 9.5 7.9*   MG  --  1.6   PHOS  --  3.0   ALKPHOS 161* 98   AST 45* 20   ALT 37 23   BILITOT 0.6 0.2   PROT 7.2 4.9*   ALBUMIN 2.4* 1.7*   ANIONGAP  14 9     Recent Labs   Lab 03/03/20  1655   APPEARCSF Slightly hazy*   CSFWBC 225*   CSFRBC 24*   SEG 68*   LYMPHS 4*   MONOMACCSF 28   GLUCCSF 39*   PROTEINCSF 174*     Significant Imaging:   No new imaging this morning.      Assessment/Plan:      * Meningitis  - Meningitis with LP 03/03 demonstrating significant elevation in WBCs, low glucose, and elevated protein.  - Continuing broad coverage with acyclovir 10mg/kg IV q8hr, ampicillin 2g IV q4hr, ceftriaxone 2g IV q12hr, vancomycin IV with pharmacy dosing assistance.  - Labs more consistent with bacterial though given duration, feel that viral may be more likely; will discuss with ID.  - CSF culture, HSV pending; patient/family live in [place], potentially WNV?  - ID consulted; appreciate assistance.    Anemia  - Hgb mildly low since 10/2019. No evidence of acute bleed.  - Check iron studies, retic, B12, folate to further evaluate.    Transaminitis  - Mild; unclear etiology. Improved with IVFs. Some reported abdominal discomfort 03/04.  - Check U/S Abd Limited to further evaluate.    Leukocytosis  - As under meningitis.    VTE Risk Mitigation (From admission, onward)         Ordered     enoxaparin injection 40 mg  Daily      03/03/20 1645     IP VTE HIGH RISK PATIENT  Once      03/03/20 1645     Place sequential compression device  Until discontinued      03/03/20 1645              FAUSTO Saenz MD  Department of Hospital Medicine   [facility]

## 2020-03-05 LAB
ALBUMIN SERPL BCP-MCNC: 1.8 G/DL (ref 3.5–5.2)
ALP SERPL-CCNC: 107 U/L (ref 55–135)
ALT SERPL W/O P-5'-P-CCNC: 21 U/L (ref 10–44)
ANION GAP SERPL CALC-SCNC: 6 MMOL/L (ref 8–16)
AST SERPL-CCNC: 19 U/L (ref 10–40)
BASOPHILS # BLD AUTO: 0.02 K/UL (ref 0–0.2)
BASOPHILS NFR BLD: 0.2 % (ref 0–1.9)
BILIRUB SERPL-MCNC: 0.3 MG/DL (ref 0.1–1)
BUN SERPL-MCNC: 3 MG/DL (ref 6–20)
CALCIUM SERPL-MCNC: 7.9 MG/DL (ref 8.7–10.5)
CHLORIDE SERPL-SCNC: 107 MMOL/L (ref 95–110)
CO2 SERPL-SCNC: 28 MMOL/L (ref 23–29)
CREAT SERPL-MCNC: 0.7 MG/DL (ref 0.5–1.4)
DIFFERENTIAL METHOD: ABNORMAL
EOSINOPHIL # BLD AUTO: 0 K/UL (ref 0–0.5)
EOSINOPHIL NFR BLD: 0.4 % (ref 0–8)
ERYTHROCYTE [DISTWIDTH] IN BLOOD BY AUTOMATED COUNT: 12.2 % (ref 11.5–14.5)
EST. GFR  (AFRICAN AMERICAN): >60 ML/MIN/1.73 M^2
EST. GFR  (NON AFRICAN AMERICAN): >60 ML/MIN/1.73 M^2
FERRITIN SERPL-MCNC: 130 NG/ML (ref 20–300)
FOLATE SERPL-MCNC: 11.1 NG/ML (ref 4–24)
GLUCOSE SERPL-MCNC: 109 MG/DL (ref 70–110)
HAPTOGLOB SERPL-MCNC: 288 MG/DL (ref 30–250)
HAV IGM SERPL QL IA: NEGATIVE
HBV CORE IGM SERPL QL IA: NEGATIVE
HBV SURFACE AG SERPL QL IA: NEGATIVE
HCT VFR BLD AUTO: 25.3 % (ref 37–48.5)
HCV AB SERPL QL IA: NEGATIVE
HGB BLD-MCNC: 8.5 G/DL (ref 12–16)
HSV1, PCR, CSF: NEGATIVE
HSV2, PCR, CSF: NEGATIVE
IMM GRANULOCYTES # BLD AUTO: 0.16 K/UL (ref 0–0.04)
IMM GRANULOCYTES NFR BLD AUTO: 1.5 % (ref 0–0.5)
IRON SERPL-MCNC: 14 UG/DL (ref 30–160)
LDH SERPL L TO P-CCNC: 202 U/L (ref 110–260)
LYMPHOCYTES # BLD AUTO: 0.9 K/UL (ref 1–4.8)
LYMPHOCYTES NFR BLD: 8.6 % (ref 18–48)
MAGNESIUM SERPL-MCNC: 1.8 MG/DL (ref 1.6–2.6)
MCH RBC QN AUTO: 28.1 PG (ref 27–31)
MCHC RBC AUTO-ENTMCNC: 33.6 G/DL (ref 32–36)
MCV RBC AUTO: 84 FL (ref 82–98)
MONOCYTES # BLD AUTO: 0.4 K/UL (ref 0.3–1)
MONOCYTES NFR BLD: 3.8 % (ref 4–15)
NEUTROPHILS # BLD AUTO: 9.3 K/UL (ref 1.8–7.7)
NEUTROPHILS NFR BLD: 85.5 % (ref 38–73)
NRBC BLD-RTO: 0 /100 WBC
PHOSPHATE SERPL-MCNC: 2.7 MG/DL (ref 2.7–4.5)
PLATELET # BLD AUTO: 253 K/UL (ref 150–350)
PMV BLD AUTO: 9.8 FL (ref 9.2–12.9)
POTASSIUM SERPL-SCNC: 3.3 MMOL/L (ref 3.5–5.1)
PROCALCITONIN SERPL IA-MCNC: 0.21 NG/ML
PROT SERPL-MCNC: 5.4 G/DL (ref 6–8.4)
RBC # BLD AUTO: 3.03 M/UL (ref 4–5.4)
RETICS/RBC NFR AUTO: 0.4 % (ref 0.5–2.5)
SATURATED IRON: 6 % (ref 20–50)
SODIUM SERPL-SCNC: 141 MMOL/L (ref 136–145)
TOTAL IRON BINDING CAPACITY: 221 UG/DL (ref 250–450)
TRANSFERRIN SERPL-MCNC: 149 MG/DL (ref 200–375)
VANCOMYCIN TROUGH SERPL-MCNC: 5.7 UG/ML (ref 10–22)
VIT B12 SERPL-MCNC: 1076 PG/ML (ref 210–950)
WBC # BLD AUTO: 10.85 K/UL (ref 3.9–12.7)

## 2020-03-05 PROCEDURE — 25000003 PHARM REV CODE 250: Performed by: PHYSICIAN ASSISTANT

## 2020-03-05 PROCEDURE — 80202 ASSAY OF VANCOMYCIN: CPT

## 2020-03-05 PROCEDURE — 84145 PROCALCITONIN (PCT): CPT

## 2020-03-05 PROCEDURE — 83010 ASSAY OF HAPTOGLOBIN QUANT: CPT

## 2020-03-05 PROCEDURE — 82728 ASSAY OF FERRITIN: CPT

## 2020-03-05 PROCEDURE — 99233 SBSQ HOSP IP/OBS HIGH 50: CPT | Mod: ,,, | Performed by: INTERNAL MEDICINE

## 2020-03-05 PROCEDURE — 82746 ASSAY OF FOLIC ACID SERUM: CPT

## 2020-03-05 PROCEDURE — 11000001 HC ACUTE MED/SURG PRIVATE ROOM

## 2020-03-05 PROCEDURE — 83615 LACTATE (LD) (LDH) ENZYME: CPT

## 2020-03-05 PROCEDURE — 83540 ASSAY OF IRON: CPT

## 2020-03-05 PROCEDURE — 82607 VITAMIN B-12: CPT

## 2020-03-05 PROCEDURE — 36415 COLL VENOUS BLD VENIPUNCTURE: CPT

## 2020-03-05 PROCEDURE — 87632 RESP VIRUS 6-11 TARGETS: CPT

## 2020-03-05 PROCEDURE — 85045 AUTOMATED RETICULOCYTE COUNT: CPT

## 2020-03-05 PROCEDURE — 94761 N-INVAS EAR/PLS OXIMETRY MLT: CPT

## 2020-03-05 PROCEDURE — 99233 PR SUBSEQUENT HOSPITAL CARE,LEVL III: ICD-10-PCS | Mod: ,,, | Performed by: INTERNAL MEDICINE

## 2020-03-05 PROCEDURE — 80074 ACUTE HEPATITIS PANEL: CPT

## 2020-03-05 PROCEDURE — 63600175 PHARM REV CODE 636 W HCPCS: Performed by: INTERNAL MEDICINE

## 2020-03-05 PROCEDURE — 25000003 PHARM REV CODE 250: Performed by: INTERNAL MEDICINE

## 2020-03-05 PROCEDURE — 85025 COMPLETE CBC W/AUTO DIFF WBC: CPT

## 2020-03-05 PROCEDURE — 80053 COMPREHEN METABOLIC PANEL: CPT

## 2020-03-05 PROCEDURE — 84100 ASSAY OF PHOSPHORUS: CPT

## 2020-03-05 PROCEDURE — 83735 ASSAY OF MAGNESIUM: CPT

## 2020-03-05 RX ORDER — AMOXICILLIN 250 MG
1 CAPSULE ORAL 2 TIMES DAILY PRN
Status: DISCONTINUED | OUTPATIENT
Start: 2020-03-05 | End: 2020-03-11 | Stop reason: HOSPADM

## 2020-03-05 RX ORDER — ASCORBIC ACID 250 MG
250 TABLET ORAL EVERY OTHER DAY
Status: DISCONTINUED | OUTPATIENT
Start: 2020-03-05 | End: 2020-03-11 | Stop reason: HOSPADM

## 2020-03-05 RX ORDER — POTASSIUM CHLORIDE 20 MEQ/1
40 TABLET, EXTENDED RELEASE ORAL ONCE
Status: DISCONTINUED | OUTPATIENT
Start: 2020-03-05 | End: 2020-03-05

## 2020-03-05 RX ORDER — AMOXICILLIN 250 MG
1 CAPSULE ORAL ONCE
Status: COMPLETED | OUTPATIENT
Start: 2020-03-05 | End: 2020-03-05

## 2020-03-05 RX ORDER — FERROUS SULFATE 325(65) MG
325 TABLET, DELAYED RELEASE (ENTERIC COATED) ORAL EVERY OTHER DAY
Status: DISCONTINUED | OUTPATIENT
Start: 2020-03-05 | End: 2020-03-11 | Stop reason: HOSPADM

## 2020-03-05 RX ORDER — POTASSIUM CHLORIDE 20 MEQ/1
40 TABLET, EXTENDED RELEASE ORAL ONCE
Status: COMPLETED | OUTPATIENT
Start: 2020-03-05 | End: 2020-03-05

## 2020-03-05 RX ORDER — POLYETHYLENE GLYCOL 3350 17 G/17G
17 POWDER, FOR SOLUTION ORAL DAILY
Status: DISCONTINUED | OUTPATIENT
Start: 2020-03-05 | End: 2020-03-11 | Stop reason: HOSPADM

## 2020-03-05 RX ADMIN — AMPICILLIN SODIUM 2 G: 2 INJECTION, POWDER, FOR SOLUTION INTRAVENOUS at 09:03

## 2020-03-05 RX ADMIN — AMPICILLIN SODIUM 2 G: 2 INJECTION, POWDER, FOR SOLUTION INTRAVENOUS at 05:03

## 2020-03-05 RX ADMIN — ACYCLOVIR SODIUM 520 MG: 1000 INJECTION, SOLUTION INTRAVENOUS at 08:03

## 2020-03-05 RX ADMIN — SODIUM CHLORIDE: 0.9 INJECTION, SOLUTION INTRAVENOUS at 01:03

## 2020-03-05 RX ADMIN — POLYETHYLENE GLYCOL 3350 17 G: 17 POWDER, FOR SOLUTION ORAL at 03:03

## 2020-03-05 RX ADMIN — AMPICILLIN SODIUM 2 G: 2 INJECTION, POWDER, FOR SOLUTION INTRAVENOUS at 02:03

## 2020-03-05 RX ADMIN — VANCOMYCIN HYDROCHLORIDE 750 MG: 750 INJECTION, POWDER, LYOPHILIZED, FOR SOLUTION INTRAVENOUS at 11:03

## 2020-03-05 RX ADMIN — VANCOMYCIN HYDROCHLORIDE 750 MG: 750 INJECTION, POWDER, LYOPHILIZED, FOR SOLUTION INTRAVENOUS at 03:03

## 2020-03-05 RX ADMIN — ACYCLOVIR SODIUM 520 MG: 1000 INJECTION, SOLUTION INTRAVENOUS at 11:03

## 2020-03-05 RX ADMIN — AMPICILLIN SODIUM 2 G: 2 INJECTION, POWDER, FOR SOLUTION INTRAVENOUS at 10:03

## 2020-03-05 RX ADMIN — DOCUSATE SODIUM 50MG AND SENNOSIDES 8.6MG 1 TABLET: 8.6; 5 TABLET, FILM COATED ORAL at 06:03

## 2020-03-05 RX ADMIN — ACYCLOVIR SODIUM 520 MG: 1000 INJECTION, SOLUTION INTRAVENOUS at 04:03

## 2020-03-05 RX ADMIN — CEFTRIAXONE 2 G: 2 INJECTION, SOLUTION INTRAVENOUS at 09:03

## 2020-03-05 RX ADMIN — ACETAMINOPHEN 650 MG: 325 TABLET ORAL at 11:03

## 2020-03-05 RX ADMIN — ENOXAPARIN SODIUM 40 MG: 100 INJECTION SUBCUTANEOUS at 04:03

## 2020-03-05 RX ADMIN — FERROUS SULFATE TAB EC 325 MG (65 MG FE EQUIVALENT) 325 MG: 325 (65 FE) TABLET DELAYED RESPONSE at 03:03

## 2020-03-05 RX ADMIN — ACYCLOVIR SODIUM 520 MG: 1000 INJECTION, SOLUTION INTRAVENOUS at 12:03

## 2020-03-05 RX ADMIN — DOCUSATE SODIUM 50MG AND SENNOSIDES 8.6MG 1 TABLET: 8.6; 5 TABLET, FILM COATED ORAL at 08:03

## 2020-03-05 RX ADMIN — CEFTRIAXONE 2 G: 2 INJECTION, SOLUTION INTRAVENOUS at 08:03

## 2020-03-05 RX ADMIN — POTASSIUM CHLORIDE 40 MEQ: 1500 TABLET, EXTENDED RELEASE ORAL at 09:03

## 2020-03-05 RX ADMIN — VANCOMYCIN HYDROCHLORIDE 750 MG: 750 INJECTION, POWDER, LYOPHILIZED, FOR SOLUTION INTRAVENOUS at 06:03

## 2020-03-05 RX ADMIN — Medication 250 MG: at 03:03

## 2020-03-05 RX ADMIN — AMPICILLIN SODIUM 2 G: 2 INJECTION, POWDER, FOR SOLUTION INTRAVENOUS at 01:03

## 2020-03-05 RX ADMIN — BUTALBITAL, ACETAMINOPHEN AND CAFFEINE 1 TABLET: 50; 325; 40 TABLET ORAL at 03:03

## 2020-03-05 NOTE — NURSING
AAOX4. VSS. Pt did spike a 102.2 temp.  Dr Holliday notified. Chest xray done at bedside. Pt free of trauma, falls, injury and skin breakdown. Pt denies pain at this time. Pt given Fiorcet x1; HA resolved. Nasal swab collected and sent to lab. No BM this shift. Pt has been eating and voiding adequately throughout shift. Pt ambulates and repositions self independently. Purposeful hourly rounding. Pt has call light in reach, side rails up X2, bed in low position,SCDs and nonskid socks on. Pt lying in bed in no distress. Will continue to monitor.

## 2020-03-05 NOTE — PLAN OF CARE
AAOX4. VSS.Pt free of trauma, falls, injury and skin breakdown. Pt denies pain at this time. Pt given Fiorcet x1; HA resolved. NaCl infusing @100ml/hr. Treated w/ IV Abx. Urine sample collected and sent to lab. No BM this shift. Pt has been eating and voiding adequately throughout shift. Pt ambulates and repositions self independently. Purposeful hourly rounding. Pt has call light in reach, side rails up X2, bed in low position,SCDs and nonskid socks on. Pt lying in bed in no distress. Will continue to monitor. US of abdomen completed.

## 2020-03-05 NOTE — PLAN OF CARE
Informed by [facility] that there was not enough fluid to complete encephalitis panel on fluid.  Shona with medicine notified.    Pt awake throughout evening.  Pt denies headache.      Pt's T max 99.4.  Pt's HR 90s-110s on telemetry.  Pt's HR up to 120s with repositioning.    NS infusing at 100 mls/hr.  IV antibiotics administered per MAR.  Vanc troph to be drawn at 0530 before 0600 dose.    Pt aware of stool sample to be collected.  Pt states she has not had BM since 3/3 AM.  Pt requesting stool softener this AM, Shona with medicine notified, one time dose senokot ordered.     Pt ambulating independently.

## 2020-03-05 NOTE — PROGRESS NOTES
[facility] Hospital Medicine  Progress Note    Patient Name: Melinda Parekh  MRN: [id number]  Patient Class: IP- Inpatient   Admission Date: 3/3/2020  Length of Stay: 2 days  Attending Physician: EMY Saenz MD  Primary Care Provider: Maicol Jensen MD        Subjective:     Principal Problem:Meningitis        HPI:  Ms. Parekh is a 29/F with PMH significant for recent vaginal delivery 01/03/20 with episiotomy, subsequent infection with prevotella spp. s/p treatment with amoxicillin-clavulanate and metronidazole who presented to ED 03/03 with a reported 9 day history of illness. She reports symptoms began with diffuse body aches, fever, chills, and fatigue. Tmax at home has been up to 102F. Subsequently developed diarrhea with two episodes daily. As symptoms persisted she visited her PCP 02/26; rapid flu and strep were negative and she was presumed to have gastroenteritis and was treated symptomatically. With progressive body aches presented to ED 02/27 and had nausea/vomiting in addition. Evaluation at that time was notable for no significant leukocytosis, equivocal UA (urine culture no growth), no CK elevation, tachycardia that resolved with fluids and she was subsequently discharged home; empiric antibiotic therapy was held at that time. Upon return home her symptoms continued to worsen. She then developed dizziness, visual disturbance and headache; denied neck pain or stiffness. She subsequently presented to ED 03/03 for further evaluation. ED workup was notable for leukocytosis, Tmax 100.4F, mild transaminitis. Hospital medicine was contacted for admission.    Overview/Hospital Course:  No notes on file    Interval History: Tmax 100.0F overnight. Feeling gradually better.    Review of Systems   Constitutional: Positive for fever. Negative for chills.   Respiratory: Negative for cough and shortness of breath.    Cardiovascular: Negative for chest pain and palpitations.   Gastrointestinal:  Positive for abdominal distention. Negative for abdominal pain, nausea and vomiting.     Objective:     Vital Signs (Most Recent):  Temp: (!) 102.2 °F (39 °C) (03/05/20 1559)  Pulse: 96 (03/05/20 1948)  Resp: 18 (03/05/20 1948)  BP: (!) 143/89 (03/05/20 1559)  SpO2: 99 % (03/05/20 1948) Vital Signs (24h Range):  Temp:  [98.4 °F (36.9 °C)-102.2 °F (39 °C)] 102.2 °F (39 °C)  Pulse:  [] 96  Resp:  [16-20] 18  SpO2:  [92 %-99 %] 99 %  BP: (117-143)/(72-89) 143/89     Weight: 57.2 kg (126 lb)  Body mass index is 22.32 kg/m².    Intake/Output Summary (Last 24 hours) at 3/5/2020 2005  Last data filed at 3/5/2020 0643  Gross per 24 hour   Intake 1300 ml   Output --   Net 1300 ml        Physical Exam   Constitutional: She is oriented to person, place, and time. She appears well-developed. No distress.   HENT:   Head: Normocephalic and atraumatic.   Eyes: Conjunctivae are normal. Right eye exhibits no discharge. Left eye exhibits no discharge.   Cardiovascular: Normal rate and intact distal pulses.   Pulmonary/Chest: Effort normal. No respiratory distress.   Abdominal: Soft. Bowel sounds are normal. She exhibits distension. There is no tenderness.   Musculoskeletal: Normal range of motion. She exhibits no edema.   Neurological: She is alert and oriented to person, place, and time.   Skin: Skin is warm and dry.   Nursing note and vitals reviewed.    Significant Labs:   CBC:  Recent Labs   Lab 03/03/20  1206 03/04/20  0452 03/05/20  0508   WBC 14.50* 9.79 10.85   HGB 10.3* 8.5* 8.5*   HCT 29.6* 24.5* 25.3*    221 253   GRAN 91.0* 85.2*  8.3* 85.5*  9.3*   LYMPH 6.0*  CANCELED 8.8*  0.9* 8.6*  0.9*   MONO 3.0*  CANCELED 3.9*  0.4 3.8*  0.4   EOS CANCELED 0.0 0.0   BASO CANCELED 0.02 0.02      CMP:  Recent Labs   Lab 03/03/20  1206 03/04/20  0452 03/05/20  0508    142 141   K 3.9 2.8* 3.3*    110 107   CO2 24 23 28   BUN 8 6 3*   CREATININE 0.8 0.7 0.7   GLU 83 101 109   CALCIUM 9.5 7.9* 7.9*   MG   --  1.6 1.8   PHOS  --  3.0 2.7   ALKPHOS 161* 98 107   AST 45* 20 19   ALT 37 23 21   BILITOT 0.6 0.2 0.3   PROT 7.2 4.9* 5.4*   ALBUMIN 2.4* 1.7* 1.8*   ANIONGAP 14 9 6*     Recent Labs   Lab 03/03/20  1655   APPEARCSF Slightly hazy*   CSFWBC 225*   CSFRBC 24*   SEG 68*   LYMPHS 4*   MONOMACCSF 28   GLUCCSF 39*   PROTEINCSF 174*     Significant Imaging:   No new imaging this morning.      Assessment/Plan:      * Meningitis  - Meningitis with LP 03/03 demonstrating significant elevation in WBCs, low glucose, and elevated protein.  - Continuing broad coverage with acyclovir 10mg/kg IV q8hr, ampicillin 2g IV q4hr, ceftriaxone 2g IV q12hr, vancomycin IV with pharmacy dosing assistance.  - Labs more consistent with bacterial though given duration, feel that viral may be more likely; will discuss with ID.  - CSF culture, HSV pending; patient/family live in [place], potentially WNV?  - ID consulted; appreciate assistance.    Anemia  - Hgb mildly low since 10/2019. No evidence of acute bleed.  - Iron studies most consistent with iron deficiency; initiate ferrous sulfate 325mg PO every other day, ascorbic acid 250mg PO every other day.    Transaminitis  - Mild; unclear etiology. Improved with IVFs. Some reported abdominal discomfort 03/04.  - U/S Abd with mild hepatomegaly; acute hepatitis panel in process.    Leukocytosis  - As under meningitis.    VTE Risk Mitigation (From admission, onward)         Ordered     enoxaparin injection 40 mg  Daily      03/03/20 1645     IP VTE HIGH RISK PATIENT  Once      03/03/20 1645     Place sequential compression device  Until discontinued      03/03/20 1645                      FAUSTO Saenz MD  Department of Hospital Medicine   [facility]

## 2020-03-05 NOTE — PROGRESS NOTES
Pharmacokinetic Assessment Follow Up: IV Vancomycin    Vancomycin serum concentration assessment(s):    The trough level was drawn correctly and can be used to guide therapy at this time. The measurement is below the desired definitive target range of 15 to 20 mcg/mL.    Vancomycin Regimen Plan:    Change regimen to Vancomycin 750 mg IV every 8 hours with next serum trough concentration measured at 0615 prior to 4th dose on 3/6/2020     Drug levels (last 3 results):  Recent Labs   Lab Result Units 03/05/20  0508   Vancomycin-Trough ug/mL 5.7*       Pharmacy will continue to follow and monitor vancomycin.    Please contact pharmacy at [phone] for questions regarding this assessment.    Thank you for the consult,   Ana Wiley       Patient brief summary:  Melinda Parekh is a 29 y.o. female initiated on antimicrobial therapy with IV Vancomycin for treatment of bacteremia    The patient's current regimen is 750 mg q12h, subtherapeutic trough, change as described above    Drug Allergies:   Review of patient's allergies indicates:   Allergen Reactions    No known drug allergies        Actual Body Weight:   57.2 kg    Renal Function:   Estimated Creatinine Clearance: 96.3 mL/min (based on SCr of 0.7 mg/dL).,     Dialysis Method (if applicable):  N/A    CBC (last 72 hours):  Recent Labs   Lab Result Units 03/03/20  1206 03/04/20  0452 03/05/20  0508   WBC K/uL 14.50* 9.79 10.85   Hemoglobin g/dL 10.3* 8.5* 8.5*   Hematocrit % 29.6* 24.5* 25.3*   Platelets K/uL 235 221 253   Gran% % 91.0* 85.2* 85.5*   Lymph% % 6.0* 8.8* 8.6*   Mono% % 3.0* 3.9* 3.8*   Eosinophil% % 0.0 0.3 0.4   Basophil% % 0.0 0.2 0.2   Differential Method  Manual Automated Automated       Metabolic Panel (last 72 hours):  Recent Labs   Lab Result Units 03/03/20  1206 03/03/20  1245 03/03/20  1655 03/04/20  0452 03/05/20  0508   Sodium mmol/L 139  --   --  142 141   Potassium mmol/L 3.9  --   --  2.8* 3.3*   Chloride mmol/L 101  --   --  110 107   CO2  mmol/L 24  --   --  23 28   Glucose mg/dL 83  --   --  101 109   Glucose, CSF mg/dL  --   --  39*  --   --    Glucose, UA   --  Negative  --   --   --    BUN, Bld mg/dL 8  --   --  6 3*   Creatinine mg/dL 0.8  --   --  0.7 0.7   Albumin g/dL 2.4*  --   --  1.7* 1.8*   Total Bilirubin mg/dL 0.6  --   --  0.2 0.3   Alkaline Phosphatase U/L 161*  --   --  98 107   AST U/L 45*  --   --  20 19   ALT U/L 37  --   --  23 21   Magnesium mg/dL  --   --   --  1.6 1.8   Phosphorus mg/dL  --   --   --  3.0 2.7       Vancomycin Administrations:  vancomycin given in the last 96 hours                     vancomycin 750 mg in dextrose 5 % 250 mL IVPB (ready to mix system) (mg) 750 mg New Bag 03/05/20 0643     750 mg New Bag 03/04/20 1735     750 mg New Bag  0759    vancomycin 1.5 g in 5 % dextrose 250 mL IVPB (mg) 1,500 mg New Bag 03/03/20 1825                      Microbiologic Results:  Microbiology Results (last 7 days)       Procedure Component Value Units Date/Time    CSF culture [[id number]] Collected:  03/03/20 1658    Order Status:  Completed Specimen:  CSF (Spinal Fluid) from CSF Tap, Tube 2 Updated:  03/05/20 0716     CSF CULTURE No Growth to date     Gram Stain Result No epithelial cells      Moderate WBC's      No organisms seen    Blood culture #1 [[id number]] Collected:  03/03/20 1204    Order Status:  Completed Specimen:  Blood from Peripheral, Antecubital, Right Updated:  03/04/20 2012     Blood Culture, Routine No Growth to date      No Growth to date    Narrative:       Blood Culture #1    Blood culture #2 [[id number]] Collected:  03/03/20 1330    Order Status:  Completed Specimen:  Blood Updated:  03/04/20 2012     Blood Culture, Routine No Growth to date      No Growth to date    Narrative:       Blood Culture #2    Respiratory Viral Panel by PCR [facility]; Sputum [[id number]]     Order Status:  No result Specimen:  Respiratory     AFB Culture & Smear [[id number]]     Order Status:  Canceled Specimen:  CSF (Spinal  Fluid) from CSF Tap, Tube 2

## 2020-03-06 PROBLEM — D50.9 IRON DEFICIENCY ANEMIA: Status: ACTIVE | Noted: 2020-03-03

## 2020-03-06 LAB
ALBUMIN SERPL BCP-MCNC: 1.9 G/DL (ref 3.5–5.2)
ALP SERPL-CCNC: 101 U/L (ref 55–135)
ALT SERPL W/O P-5'-P-CCNC: 22 U/L (ref 10–44)
ANION GAP SERPL CALC-SCNC: 8 MMOL/L (ref 8–16)
AST SERPL-CCNC: 19 U/L (ref 10–40)
B-HCG UR QL: NEGATIVE
BASOPHILS # BLD AUTO: 0.03 K/UL (ref 0–0.2)
BASOPHILS NFR BLD: 0.2 % (ref 0–1.9)
BILIRUB SERPL-MCNC: 0.4 MG/DL (ref 0.1–1)
BUN SERPL-MCNC: 4 MG/DL (ref 6–20)
CALCIUM SERPL-MCNC: 8.5 MG/DL (ref 8.7–10.5)
CHLORIDE SERPL-SCNC: 105 MMOL/L (ref 95–110)
CO2 SERPL-SCNC: 28 MMOL/L (ref 23–29)
CREAT SERPL-MCNC: 0.7 MG/DL (ref 0.5–1.4)
CTP QC/QA: YES
DIFFERENTIAL METHOD: ABNORMAL
EOSINOPHIL # BLD AUTO: 0 K/UL (ref 0–0.5)
EOSINOPHIL NFR BLD: 0.3 % (ref 0–8)
ERYTHROCYTE [DISTWIDTH] IN BLOOD BY AUTOMATED COUNT: 12 % (ref 11.5–14.5)
EST. GFR  (AFRICAN AMERICAN): >60 ML/MIN/1.73 M^2
EST. GFR  (NON AFRICAN AMERICAN): >60 ML/MIN/1.73 M^2
GLUCOSE SERPL-MCNC: 100 MG/DL (ref 70–110)
HCT VFR BLD AUTO: 27.5 % (ref 37–48.5)
HGB BLD-MCNC: 9.3 G/DL (ref 12–16)
IMM GRANULOCYTES # BLD AUTO: 0.17 K/UL (ref 0–0.04)
IMM GRANULOCYTES NFR BLD AUTO: 1.1 % (ref 0–0.5)
LYMPHOCYTES # BLD AUTO: 0.8 K/UL (ref 1–4.8)
LYMPHOCYTES NFR BLD: 5.1 % (ref 18–48)
MAGNESIUM SERPL-MCNC: 1.7 MG/DL (ref 1.6–2.6)
MCH RBC QN AUTO: 28.4 PG (ref 27–31)
MCHC RBC AUTO-ENTMCNC: 33.8 G/DL (ref 32–36)
MCV RBC AUTO: 84 FL (ref 82–98)
MONOCYTES # BLD AUTO: 0.2 K/UL (ref 0.3–1)
MONOCYTES NFR BLD: 1.2 % (ref 4–15)
NEUTROPHILS # BLD AUTO: 14.6 K/UL (ref 1.8–7.7)
NEUTROPHILS NFR BLD: 92.1 % (ref 38–73)
NRBC BLD-RTO: 0 /100 WBC
PHOSPHATE SERPL-MCNC: 3.3 MG/DL (ref 2.7–4.5)
PLATELET # BLD AUTO: 272 K/UL (ref 150–350)
PMV BLD AUTO: 9.5 FL (ref 9.2–12.9)
POTASSIUM SERPL-SCNC: 4 MMOL/L (ref 3.5–5.1)
PROT SERPL-MCNC: 5.6 G/DL (ref 6–8.4)
RBC # BLD AUTO: 3.28 M/UL (ref 4–5.4)
SODIUM SERPL-SCNC: 141 MMOL/L (ref 136–145)
VANCOMYCIN TROUGH SERPL-MCNC: 17.2 UG/ML (ref 10–22)
WBC # BLD AUTO: 15.78 K/UL (ref 3.9–12.7)
WBC #/AREA STL HPF: NORMAL /[HPF]
WEST NILE VIRUS CSF INTERP: NORMAL
WNV IGG CSF QL: NEGATIVE
WNV IGM CSF QL IA: NEGATIVE

## 2020-03-06 PROCEDURE — 85025 COMPLETE CBC W/AUTO DIFF WBC: CPT

## 2020-03-06 PROCEDURE — 25500020 PHARM REV CODE 255: Performed by: INTERNAL MEDICINE

## 2020-03-06 PROCEDURE — 84100 ASSAY OF PHOSPHORUS: CPT

## 2020-03-06 PROCEDURE — 63600175 PHARM REV CODE 636 W HCPCS: Performed by: INTERNAL MEDICINE

## 2020-03-06 PROCEDURE — 99233 SBSQ HOSP IP/OBS HIGH 50: CPT | Mod: ,,, | Performed by: INTERNAL MEDICINE

## 2020-03-06 PROCEDURE — 99233 PR SUBSEQUENT HOSPITAL CARE,LEVL III: ICD-10-PCS | Mod: ,,, | Performed by: INTERNAL MEDICINE

## 2020-03-06 PROCEDURE — 87040 BLOOD CULTURE FOR BACTERIA: CPT

## 2020-03-06 PROCEDURE — 83735 ASSAY OF MAGNESIUM: CPT

## 2020-03-06 PROCEDURE — 81025 URINE PREGNANCY TEST: CPT | Performed by: INTERNAL MEDICINE

## 2020-03-06 PROCEDURE — 94761 N-INVAS EAR/PLS OXIMETRY MLT: CPT

## 2020-03-06 PROCEDURE — 25000003 PHARM REV CODE 250: Performed by: PHYSICIAN ASSISTANT

## 2020-03-06 PROCEDURE — 80053 COMPREHEN METABOLIC PANEL: CPT

## 2020-03-06 PROCEDURE — 25000003 PHARM REV CODE 250: Performed by: INTERNAL MEDICINE

## 2020-03-06 PROCEDURE — 87507 IADNA-DNA/RNA PROBE TQ 12-25: CPT

## 2020-03-06 PROCEDURE — 11000001 HC ACUTE MED/SURG PRIVATE ROOM

## 2020-03-06 PROCEDURE — 89055 LEUKOCYTE ASSESSMENT FECAL: CPT

## 2020-03-06 PROCEDURE — 80202 ASSAY OF VANCOMYCIN: CPT

## 2020-03-06 PROCEDURE — 36415 COLL VENOUS BLD VENIPUNCTURE: CPT

## 2020-03-06 RX ADMIN — AMPICILLIN SODIUM 2 G: 2 INJECTION, POWDER, FOR SOLUTION INTRAVENOUS at 01:03

## 2020-03-06 RX ADMIN — AMPICILLIN SODIUM 2 G: 2 INJECTION, POWDER, FOR SOLUTION INTRAVENOUS at 11:03

## 2020-03-06 RX ADMIN — AMPICILLIN SODIUM 2 G: 2 INJECTION, POWDER, FOR SOLUTION INTRAVENOUS at 05:03

## 2020-03-06 RX ADMIN — ENOXAPARIN SODIUM 40 MG: 100 INJECTION SUBCUTANEOUS at 04:03

## 2020-03-06 RX ADMIN — ACYCLOVIR SODIUM 520 MG: 1000 INJECTION, SOLUTION INTRAVENOUS at 07:03

## 2020-03-06 RX ADMIN — AMPICILLIN SODIUM 2 G: 2 INJECTION, POWDER, FOR SOLUTION INTRAVENOUS at 09:03

## 2020-03-06 RX ADMIN — CEFTRIAXONE 2 G: 2 INJECTION, SOLUTION INTRAVENOUS at 09:03

## 2020-03-06 RX ADMIN — ACETAMINOPHEN 650 MG: 325 TABLET ORAL at 02:03

## 2020-03-06 RX ADMIN — IOHEXOL 75 ML: 350 INJECTION, SOLUTION INTRAVENOUS at 08:03

## 2020-03-06 RX ADMIN — ACETAMINOPHEN 650 MG: 325 TABLET ORAL at 06:03

## 2020-03-06 RX ADMIN — VANCOMYCIN HYDROCHLORIDE 750 MG: 750 INJECTION, POWDER, LYOPHILIZED, FOR SOLUTION INTRAVENOUS at 04:03

## 2020-03-06 RX ADMIN — BUTALBITAL, ACETAMINOPHEN AND CAFFEINE 1 TABLET: 50; 325; 40 TABLET ORAL at 10:03

## 2020-03-06 RX ADMIN — AMPICILLIN SODIUM 2 G: 2 INJECTION, POWDER, FOR SOLUTION INTRAVENOUS at 03:03

## 2020-03-06 RX ADMIN — IOHEXOL 1000 ML: 9 SOLUTION ORAL at 04:03

## 2020-03-06 NOTE — NURSING
Pt to have CT with oral and IV contrast.  Oral contrast x2 bottles.  Pt is unable to drink 2nd bottle of contrast - making her feel sick.  Dr. Holliday notified and he stated that one was ok and that the IV was more important. Pt stop drinking at 1730-- ct scan notified and they will put her in for transport for 1830.  Pt and family notified.

## 2020-03-06 NOTE — SUBJECTIVE & OBJECTIVE
Interval History:  Patient continues to have fevers, chills  Headache at 5/10, down from 10/10  Blurry vision has resolved  Notes dry cough  Reports abdominal distention      Review of Systems   Constitutional: Positive for chills and fever. Negative for diaphoresis.   HENT: Negative for rhinorrhea and sore throat.    Respiratory: Positive for cough. Negative for shortness of breath.    Cardiovascular: Negative for chest pain, palpitations and leg swelling.   Gastrointestinal: Positive for abdominal distention and abdominal pain. Negative for diarrhea, nausea and vomiting.   Genitourinary: Negative for dysuria and hematuria.   Musculoskeletal: Positive for arthralgias and myalgias.   Skin: Negative for rash.   Neurological: Positive for headaches.     Objective:     Vital Signs (Most Recent):  Temp: 99.1 °F (37.3 °C) (03/06/20 1540)  Pulse: (!) 119 (03/06/20 1600)  Resp: 18 (03/06/20 1540)  BP: 137/81 (03/06/20 1540)  SpO2: 99 % (03/06/20 1540) Vital Signs (24h Range):  Temp:  [98.2 °F (36.8 °C)-102.9 °F (39.4 °C)] 99.1 °F (37.3 °C)  Pulse:  [] 119  Resp:  [16-20] 18  SpO2:  [94 %-99 %] 99 %  BP: (129-137)/(67-84) 137/81     Weight: 57.2 kg (126 lb)  Body mass index is 22.32 kg/m².    Intake/Output Summary (Last 24 hours) at 3/6/2020 1705  Last data filed at 3/6/2020 1000  Gross per 24 hour   Intake 2110 ml   Output --   Net 2110 ml        Physical Exam   Constitutional: She is oriented to person, place, and time. She appears well-developed and well-nourished. No distress.   HENT:   Head: Normocephalic and atraumatic.   Eyes: Conjunctivae and EOM are normal.   Neck: Normal range of motion. Neck supple.   Cardiovascular: Normal rate, regular rhythm and normal heart sounds.   Pulmonary/Chest: Effort normal and breath sounds normal. No respiratory distress. She has no wheezes. She has no rales.   Abdominal: Soft. Bowel sounds are normal. She exhibits distension. There is no tenderness. There is no guarding.    Musculoskeletal: Normal range of motion. She exhibits no edema.   Neurological: She is alert and oriented to person, place, and time.   Skin: Skin is warm and dry. No rash noted. She is not diaphoretic. No erythema.   Psychiatric: She has a normal mood and affect. Her behavior is normal.   Vitals reviewed.    Significant Labs:   All labs within the last 24 hours reviewed    Significant Imaging:   All imaging within the last 24 hours reviewed

## 2020-03-06 NOTE — ASSESSMENT & PLAN NOTE
- Mild; unclear etiology. Improved with IVFs. Some reported abdominal discomfort 03/04.  - U/S Abd with mild hepatomegaly; acute hepatitis panel in process.

## 2020-03-06 NOTE — PROGRESS NOTES
[facility]  Infectious Disease  Progress Note    Patient Name: Melinda Parekh  MRN: [id number]  Admission Date: 3/3/2020  Length of Stay: 3 days  Attending Physician: EMY Saenz MD  Primary Care Provider: Maicol Jensen MD    Isolation Status: No active isolations  Assessment/Plan:      * Meningitis  29F with history of vaginal delivery on 1/03/20 requiring episiotomy and repair presents with a week of fevers, chills, myalgias, arthalgias, nausea, vomiting, blanching rash, followed by acute onset of headache, neck stiffness, and blurry vision.      Patient reports onset of illness started after going [facility].  Denied any known sick contacts, no recent history of eating unpasturized cheese or deli meat.  Lives near [facility] with possible mosquito bite.  Two outdoor dogs at home, no recent travel, no outdoor hobbies.    CSF studies notable for  (68% segs, 4% lymph), elevated protein 174, low glucose 39. HSV PCR negative.  WNV pending.  No additional CSF to perform arbovirus panel.      Patient now with ongoing fevers - more suggestive of ongoing viral process given broad empiric antibiotic coverage.      Recommendations:  - Discontinue vancomycin IV given low suspicion for b-lactam resistant S pneumoniae  - Continue ceftriaxone IV and ampicillin IV  - CT abd/pelvis to rule out any intraabdominal process to explain recurrent fevers  - EBV antibody panel sent for AM        Anticipated Disposition: Resolution of fevers    Thank you for your consult. I will follow-up with patient. Please contact us if you have any additional questions.    Irma Lopez MD  Infectious Disease  [facility]    Subjective:     Principal Problem:Meningitis    HPI: 29F with h/o vaginal delivery on 1/03/20 c/b episiotomy admitted overnight with 9 days of fever tmax 102, bodyaches/shaking chills. Says she ultimately came to hospital because of severe headache and sensation of dizziness and  blurry vision. Also reports loose stools and abdominal pain during this time period. Over past 3d reports dry cough and feeling short winded. Says she noticed fine rash on bilatearl thighs and abdomen when she took shower prior to arrival. Reports having taken 2 rounds of antibiotics recently for what she says was a skin infection from her episiotomy (thinks she was given clindamycin; also note in chart for metronidazole and augmentin), but denies recent abx use and says surgical scar now healed. Denies sick contacts.         Interval History:  Patient continues to have fevers, chills  Headache at 5/10, down from 10/10  Blurry vision has resolved  Notes dry cough  Reports abdominal distention      Review of Systems   Constitutional: Positive for chills and fever. Negative for diaphoresis.   HENT: Negative for rhinorrhea and sore throat.    Respiratory: Positive for cough. Negative for shortness of breath.    Cardiovascular: Negative for chest pain, palpitations and leg swelling.   Gastrointestinal: Positive for abdominal distention and abdominal pain. Negative for diarrhea, nausea and vomiting.   Genitourinary: Negative for dysuria and hematuria.   Musculoskeletal: Positive for arthralgias and myalgias.   Skin: Negative for rash.   Neurological: Positive for headaches.     Objective:     Vital Signs (Most Recent):  Temp: 99.1 °F (37.3 °C) (03/06/20 1540)  Pulse: (!) 119 (03/06/20 1600)  Resp: 18 (03/06/20 1540)  BP: 137/81 (03/06/20 1540)  SpO2: 99 % (03/06/20 1540) Vital Signs (24h Range):  Temp:  [98.2 °F (36.8 °C)-102.9 °F (39.4 °C)] 99.1 °F (37.3 °C)  Pulse:  [] 119  Resp:  [16-20] 18  SpO2:  [94 %-99 %] 99 %  BP: (129-137)/(67-84) 137/81     Weight: 57.2 kg (126 lb)  Body mass index is 22.32 kg/m².    Intake/Output Summary (Last 24 hours) at 3/6/2020 1705  Last data filed at 3/6/2020 1000  Gross per 24 hour   Intake 2110 ml   Output --   Net 2110 ml        Physical Exam   Constitutional: She is oriented  to person, place, and time. She appears well-developed and well-nourished. No distress.   HENT:   Head: Normocephalic and atraumatic.   Eyes: Conjunctivae and EOM are normal.   Neck: Normal range of motion. Neck supple.   Cardiovascular: Normal rate, regular rhythm and normal heart sounds.   Pulmonary/Chest: Effort normal and breath sounds normal. No respiratory distress. She has no wheezes. She has no rales.   Abdominal: Soft. Bowel sounds are normal. She exhibits distension. There is no tenderness. There is no guarding.   Musculoskeletal: Normal range of motion. She exhibits no edema.   Neurological: She is alert and oriented to person, place, and time.   Skin: Skin is warm and dry. No rash noted. She is not diaphoretic. No erythema.   Psychiatric: She has a normal mood and affect. Her behavior is normal.   Vitals reviewed.    Significant Labs:   All labs within the last 24 hours reviewed    Significant Imaging:   All imaging within the last 24 hours reviewed

## 2020-03-06 NOTE — SUBJECTIVE & OBJECTIVE
Interval History: Tmax 100.0F overnight. Feeling gradually better.    Review of Systems   Constitutional: Positive for fever. Negative for chills.   Respiratory: Negative for cough and shortness of breath.    Cardiovascular: Negative for chest pain and palpitations.   Gastrointestinal: Positive for abdominal distention. Negative for abdominal pain, nausea and vomiting.     Objective:     Vital Signs (Most Recent):  Temp: (!) 102.2 °F (39 °C) (03/05/20 1559)  Pulse: 96 (03/05/20 1948)  Resp: 18 (03/05/20 1948)  BP: (!) 143/89 (03/05/20 1559)  SpO2: 99 % (03/05/20 1948) Vital Signs (24h Range):  Temp:  [98.4 °F (36.9 °C)-102.2 °F (39 °C)] 102.2 °F (39 °C)  Pulse:  [] 96  Resp:  [16-20] 18  SpO2:  [92 %-99 %] 99 %  BP: (117-143)/(72-89) 143/89     Weight: 57.2 kg (126 lb)  Body mass index is 22.32 kg/m².    Intake/Output Summary (Last 24 hours) at 3/5/2020 2005  Last data filed at 3/5/2020 0643  Gross per 24 hour   Intake 1300 ml   Output --   Net 1300 ml        Physical Exam   Constitutional: She is oriented to person, place, and time. She appears well-developed. No distress.   HENT:   Head: Normocephalic and atraumatic.   Eyes: Conjunctivae are normal. Right eye exhibits no discharge. Left eye exhibits no discharge.   Cardiovascular: Normal rate and intact distal pulses.   Pulmonary/Chest: Effort normal. No respiratory distress.   Abdominal: Soft. Bowel sounds are normal. She exhibits distension. There is no tenderness.   Musculoskeletal: Normal range of motion. She exhibits no edema.   Neurological: She is alert and oriented to person, place, and time.   Skin: Skin is warm and dry.   Nursing note and vitals reviewed.    Significant Labs:   CBC:  Recent Labs   Lab 03/03/20  1206 03/04/20  0452 03/05/20  0508   WBC 14.50* 9.79 10.85   HGB 10.3* 8.5* 8.5*   HCT 29.6* 24.5* 25.3*    221 253   GRAN 91.0* 85.2*  8.3* 85.5*  9.3*   LYMPH 6.0*  CANCELED 8.8*  0.9* 8.6*  0.9*   MONO 3.0*  CANCELED 3.9*   0.4 3.8*  0.4   EOS CANCELED 0.0 0.0   BASO CANCELED 0.02 0.02      CMP:  Recent Labs   Lab 03/03/20  1206 03/04/20  0452 03/05/20  0508    142 141   K 3.9 2.8* 3.3*    110 107   CO2 24 23 28   BUN 8 6 3*   CREATININE 0.8 0.7 0.7   GLU 83 101 109   CALCIUM 9.5 7.9* 7.9*   MG  --  1.6 1.8   PHOS  --  3.0 2.7   ALKPHOS 161* 98 107   AST 45* 20 19   ALT 37 23 21   BILITOT 0.6 0.2 0.3   PROT 7.2 4.9* 5.4*   ALBUMIN 2.4* 1.7* 1.8*   ANIONGAP 14 9 6*     Recent Labs   Lab 03/03/20  1655   APPEARCSF Slightly hazy*   CSFWBC 225*   CSFRBC 24*   SEG 68*   LYMPHS 4*   MONOMACCSF 28   GLUCCSF 39*   PROTEINCSF 174*     Significant Imaging:   No new imaging this morning.

## 2020-03-06 NOTE — ASSESSMENT & PLAN NOTE
- Hgb mildly low since 10/2019. No evidence of acute bleed.  - Iron studies most consistent with iron deficiency; initiate ferrous sulfate 325mg PO every other day, ascorbic acid 250mg PO every other day.

## 2020-03-06 NOTE — PROGRESS NOTES
[facility] Hospital Medicine  Progress Note    Patient Name: Melinda Parekh  MRN: [id number]  Patient Class: IP- Inpatient   Admission Date: 3/3/2020  Length of Stay: 3 days  Attending Physician: EMY Saenz MD  Primary Care Provider: Maicol Jensen MD      Subjective:     Principal Problem:Meningitis    HPI:  Ms. Parekh is a 29/F with PMH significant for recent vaginal delivery 01/03/20 with episiotomy, subsequent infection with prevotella spp. s/p treatment with amoxicillin-clavulanate and metronidazole who presented to ED 03/03 with a reported 9 day history of illness. She reports symptoms began with diffuse body aches, fever, chills, and fatigue. Tmax at home has been up to 102F. Subsequently developed diarrhea with two episodes daily. As symptoms persisted she visited her PCP 02/26; rapid flu and strep were negative and she was presumed to have gastroenteritis and was treated symptomatically. With progressive body aches presented to ED 02/27 and had nausea/vomiting in addition. Evaluation at that time was notable for no significant leukocytosis, equivocal UA (urine culture no growth), no CK elevation, tachycardia that resolved with fluids and she was subsequently discharged home; empiric antibiotic therapy was held at that time. Upon return home her symptoms continued to worsen. She then developed dizziness, visual disturbance and headache; denied neck pain or stiffness. She subsequently presented to ED 03/03 for further evaluation. ED workup was notable for leukocytosis, Tmax 100.4F, mild transaminitis. Hospital medicine was contacted for admission.    Overview/Hospital Course:  No notes on file    Interval History: Febrile overnight with elevated Tmax. Feeling well at time of exam; reports mild headache, but no other major symptoms.    Review of Systems   Constitutional: Positive for chills and fever.   Respiratory: Negative for cough and shortness of breath.    Cardiovascular:  Negative for chest pain and palpitations.   Gastrointestinal: Negative for abdominal pain, nausea and vomiting.     Objective:     Vital Signs (Most Recent):  Temp: 99 °F (37.2 °C) (03/06/20 1745)  Pulse: 99 (03/06/20 1800)  Resp: 18 (03/06/20 1540)  BP: 137/81 (03/06/20 1540)  SpO2: 99 % (03/06/20 1540) Vital Signs (24h Range):  Temp:  [98.2 °F (36.8 °C)-102.9 °F (39.4 °C)] 99 °F (37.2 °C)  Pulse:  [] 99  Resp:  [16-20] 18  SpO2:  [94 %-99 %] 99 %  BP: (129-137)/(67-84) 137/81     Weight: 57.2 kg (126 lb)  Body mass index is 22.32 kg/m².    Intake/Output Summary (Last 24 hours) at 3/6/2020 1903  Last data filed at 3/6/2020 1800  Gross per 24 hour   Intake 3100 ml   Output --   Net 3100 ml      Physical Exam   Constitutional: She is oriented to person, place, and time. She appears well-developed. No distress.   HENT:   Head: Normocephalic and atraumatic.   Eyes: Conjunctivae are normal. Right eye exhibits no discharge. Left eye exhibits no discharge.   Cardiovascular: Normal rate and intact distal pulses.   Pulmonary/Chest: Effort normal. No respiratory distress.   Abdominal: Soft. Bowel sounds are normal. She exhibits no distension. There is no tenderness.   Musculoskeletal: Normal range of motion. She exhibits no edema.   Neurological: She is alert and oriented to person, place, and time.   Skin: Skin is warm and dry.   Nursing note and vitals reviewed.    Significant Labs:   CBC:  Recent Labs   Lab 03/04/20  0452 03/05/20  0508 03/06/20  0439   WBC 9.79 10.85 15.78*   HGB 8.5* 8.5* 9.3*   HCT 24.5* 25.3* 27.5*    253 272   GRAN 85.2*  8.3* 85.5*  9.3* 92.1*  14.6*   LYMPH 8.8*  0.9* 8.6*  0.9* 5.1*  0.8*   MONO 3.9*  0.4 3.8*  0.4 1.2*  0.2*   EOS 0.0 0.0 0.0   BASO 0.02 0.02 0.03      CMP:  Recent Labs   Lab 03/04/20  0452 03/05/20  0508 03/06/20  0439    141 141   K 2.8* 3.3* 4.0    107 105   CO2 23 28 28   BUN 6 3* 4*   CREATININE 0.7 0.7 0.7    109 100   CALCIUM 7.9*  7.9* 8.5*   MG 1.6 1.8 1.7   PHOS 3.0 2.7 3.3   ALKPHOS 98 107 101   AST 20 19 19   ALT 23 21 22   BILITOT 0.2 0.3 0.4   PROT 4.9* 5.4* 5.6*   ALBUMIN 1.7* 1.8* 1.9*   ANIONGAP 9 6* 8      Significant Imaging:   No new imaging this morning.      Assessment/Plan:      * Meningitis  - Meningitis with LP 03/03 demonstrating significant elevation in WBCs, low glucose, and elevated protein.  - Continuing broad coverage with ampicillin 2g IV q4hr, ceftriaxone 2g IV q12hr, vancomycin IV with pharmacy dosing assistance.  - Labs more consistent with bacterial though given duration, feel that viral may be more likely; will discuss with ID.  - CSF culture no growth to date. Blood cultures negative. Repeat culture today. HSV negative; discontinue acyclovir.  - ID consulted; appreciate assistance.    Iron deficiency anemia  - Hgb mildly low since 10/2019. No evidence of acute bleed.  - Continuing ferrous sulfate 325mg PO every other day, ascorbic acid 250mg PO every other day.    Transaminitis  - Mild; unclear etiology. Improved with IVFs. Some reported abdominal discomfort.  - U/S Abd with mild hepatomegaly; acute hepatitis panel in process.  - Resolved.    Leukocytosis  - As under meningitis.    VTE Risk Mitigation (From admission, onward)         Ordered     enoxaparin injection 40 mg  Daily      03/03/20 1645     IP VTE HIGH RISK PATIENT  Once      03/03/20 1645     Place sequential compression device  Until discontinued      03/03/20 1645                      FAUSTO Saenz MD  Department of Hospital Medicine   [facility]

## 2020-03-06 NOTE — ASSESSMENT & PLAN NOTE
29F with history of vaginal delivery on 1/03/20 requiring episiotomy and repair presents with a week of fevers, chills, myalgias, arthalgias, nausea, vomiting, blanching rash, followed by acute onset of headache, neck stiffness, and blurry vision.      Patient reports onset of illness started after going to [facility].  Denied any known sick contacts, no recent history of eating unpasturized cheese or deli meat.  Lives near [facility] with possible mosquito bite.  Two outdoor dogs at home, no recent travel, no outdoor hobbies.    CSF studies notable for  (68% segs, 4% lymph), elevated protein 174, low glucose 39. HSV PCR negative.  WNV pending.  No additional CSF to perform arbovirus panel.      Patient now with ongoing fevers - more suggestive of ongoing viral process given broad empiric antibiotic coverage.      Recommendations:  - Discontinue vancomycin IV given low suspicion for b-lactam resistant S pneumoniae  - Continue ceftriaxone IV and ampicillin IV  - CT abd/pelvis to rule out any intraabdominal process to explain recurrent fevers  - EBV antibody panel sent for AM

## 2020-03-06 NOTE — PROGRESS NOTES
Pharmacokinetic Assessment Follow Up: IV Vancomycin    Vancomycin serum concentration assessment(s):    The trough level was drawn correctly and can be used to guide therapy at this time. The measurement is within the desired definitive target range of 15 to 20 mcg/mL.    Vancomycin Regimen Plan:    Continue regimen of Vancomycin 750 mg IV every 8 hours with next serum trough concentration measured at 0615 prior to 0645 dose on 3/7/2020. If next trough remains within goal can schedule next trough in 5-7 days.     Drug levels (last 3 results):  Recent Labs   Lab Result Units 03/05/20  0508 03/06/20  0638   Vancomycin-Trough ug/mL 5.7* 17.2       Pharmacy will continue to follow and monitor vancomycin.    Please contact pharmacy at [phone] for questions regarding this assessment.    Thank you for the consult,   Ana Wiley       Patient brief summary:  Melinda Parekh is a 29 y.o. female initiated on antimicrobial therapy with IV Vancomycin for treatment of bacteremia    The patient's current regimen is 750 mg q8h    Drug Allergies:   Review of patient's allergies indicates:   Allergen Reactions    No known drug allergies        Actual Body Weight:   57.2 kg    Renal Function:   Estimated Creatinine Clearance: 96.3 mL/min (based on SCr of 0.7 mg/dL).,     Dialysis Method (if applicable):  N/A    CBC (last 72 hours):  Recent Labs   Lab Result Units 03/03/20  1206 03/04/20  0452 03/05/20  0508 03/06/20  0439   WBC K/uL 14.50* 9.79 10.85 15.78*   Hemoglobin g/dL 10.3* 8.5* 8.5* 9.3*   Hematocrit % 29.6* 24.5* 25.3* 27.5*   Platelets K/uL 235 221 253 272   Gran% % 91.0* 85.2* 85.5* 92.1*   Lymph% % 6.0* 8.8* 8.6* 5.1*   Mono% % 3.0* 3.9* 3.8* 1.2*   Eosinophil% % 0.0 0.3 0.4 0.3   Basophil% % 0.0 0.2 0.2 0.2   Differential Method  Manual Automated Automated Automated       Metabolic Panel (last 72 hours):  Recent Labs   Lab Result Units 03/03/20  1206 03/03/20  1245 03/03/20  1655 03/04/20  0452 03/05/20  0504  03/06/20  0439   Sodium mmol/L 139  --   --  142 141 141   Potassium mmol/L 3.9  --   --  2.8* 3.3* 4.0   Chloride mmol/L 101  --   --  110 107 105   CO2 mmol/L 24  --   --  23 28 28   Glucose mg/dL 83  --   --  101 109 100   Glucose, CSF mg/dL  --   --  39*  --   --   --    Glucose, UA   --  Negative  --   --   --   --    BUN, Bld mg/dL 8  --   --  6 3* 4*   Creatinine mg/dL 0.8  --   --  0.7 0.7 0.7   Albumin g/dL 2.4*  --   --  1.7* 1.8* 1.9*   Total Bilirubin mg/dL 0.6  --   --  0.2 0.3 0.4   Alkaline Phosphatase U/L 161*  --   --  98 107 101   AST U/L 45*  --   --  20 19 19   ALT U/L 37  --   --  23 21 22   Magnesium mg/dL  --   --   --  1.6 1.8 1.7   Phosphorus mg/dL  --   --   --  3.0 2.7 3.3       Vancomycin Administrations:  vancomycin given in the last 96 hours                     vancomycin 750 mg in dextrose 5 % 250 mL IVPB (ready to mix system) ()  Restarted 03/06/20 0726     750 mg New Bag 03/05/20 2317     750 mg New Bag  1518    vancomycin 750 mg in dextrose 5 % 250 mL IVPB (ready to mix system) (mg) 750 mg New Bag 03/05/20 0643     750 mg New Bag 03/04/20 1735     750 mg New Bag  0759                      Microbiologic Results:  Microbiology Results (last 7 days)       Procedure Component Value Units Date/Time    Blood culture [[id number]] Collected:  03/06/20 0638    Order Status:  Sent Specimen:  Blood Updated:  03/06/20 0811    CSF culture [[id number]] Collected:  03/03/20 1658    Order Status:  Completed Specimen:  CSF (Spinal Fluid) from CSF Tap, Tube 2 Updated:  03/06/20 0734     CSF CULTURE No Growth to date     Gram Stain Result No epithelial cells      Moderate WBC's      No organisms seen    Blood culture #1 [[id number]] Collected:  03/03/20 1204    Order Status:  Completed Specimen:  Blood from Peripheral, Antecubital, Right Updated:  03/05/20 2012     Blood Culture, Routine No Growth to date      No Growth to date      No Growth to date    Narrative:       Blood Culture #1    Blood culture  #2 [[id number]] Collected:  03/03/20 1330    Order Status:  Completed Specimen:  Blood Updated:  03/05/20 2012     Blood Culture, Routine No Growth to date      No Growth to date      No Growth to date    Narrative:       Blood Culture #2    Respiratory Viral Panel by PCR [facility]; Nasal Swab [[id number]] Collected:  03/05/20 1724    Order Status:  Sent Specimen:  Respiratory Updated:  03/05/20 1734    Respiratory Viral Panel by PCR [facility]; Sputum [[id number]]     Order Status:  Canceled Specimen:  Respiratory     AFB Culture & Smear [[id number]]     Order Status:  Canceled Specimen:  CSF (Spinal Fluid) from CSF Tap, Tube 2

## 2020-03-06 NOTE — NURSING
Pt temp 100.2; offered PRN tylenol, pt refuses at this time. Ice packs applied under arms, cool cloth to forehead. Will continue to monitor.

## 2020-03-06 NOTE — PLAN OF CARE
Remains free from fall, injury, and skin breakdown. Voiding without difficulty. Ambulates independently in room. VSS on RA, except runs tachy and elevated temps at beginning of shift, treated w/ PRN tylenol, last oral temp 99.8. Positions self independently. Denies pain/HA/N/V. Tele maintained, sinus tachy; all alarms active and audible. Peripheral IVs/dressings CDI. SCDs in place. Plan of care reviewed with patient/spouse and all questions answered. Bed low, locked. Call light within reach. Purposeful rounding performed. Resting comfortably in bed, no other complaints at this time.

## 2020-03-06 NOTE — NURSING
Pt temp 102.9 orally. PRN tylenol given, ice packs placed under both armpits. Reported to Dr. Saenz, ordered 1 set of blood cultures and will speak w/ ID today. No further orders. Will continue to monitor.

## 2020-03-07 LAB
ALBUMIN SERPL BCP-MCNC: 1.8 G/DL (ref 3.5–5.2)
ALP SERPL-CCNC: 94 U/L (ref 55–135)
ALT SERPL W/O P-5'-P-CCNC: 24 U/L (ref 10–44)
ANION GAP SERPL CALC-SCNC: 9 MMOL/L (ref 8–16)
AST SERPL-CCNC: 23 U/L (ref 10–40)
BASOPHILS # BLD AUTO: 0.03 K/UL (ref 0–0.2)
BASOPHILS NFR BLD: 0.2 % (ref 0–1.9)
BILIRUB SERPL-MCNC: 0.3 MG/DL (ref 0.1–1)
BUN SERPL-MCNC: 6 MG/DL (ref 6–20)
CALCIUM SERPL-MCNC: 8 MG/DL (ref 8.7–10.5)
CHLORIDE SERPL-SCNC: 104 MMOL/L (ref 95–110)
CO2 SERPL-SCNC: 27 MMOL/L (ref 23–29)
CREAT SERPL-MCNC: 0.7 MG/DL (ref 0.5–1.4)
DIFFERENTIAL METHOD: ABNORMAL
EOSINOPHIL # BLD AUTO: 0.1 K/UL (ref 0–0.5)
EOSINOPHIL NFR BLD: 0.4 % (ref 0–8)
ERYTHROCYTE [DISTWIDTH] IN BLOOD BY AUTOMATED COUNT: 12.2 % (ref 11.5–14.5)
EST. GFR  (AFRICAN AMERICAN): >60 ML/MIN/1.73 M^2
EST. GFR  (NON AFRICAN AMERICAN): >60 ML/MIN/1.73 M^2
GLUCOSE SERPL-MCNC: 92 MG/DL (ref 70–110)
HCT VFR BLD AUTO: 25 % (ref 37–48.5)
HGB BLD-MCNC: 8.4 G/DL (ref 12–16)
IMM GRANULOCYTES # BLD AUTO: 0.19 K/UL (ref 0–0.04)
IMM GRANULOCYTES NFR BLD AUTO: 1.3 % (ref 0–0.5)
LYMPHOCYTES # BLD AUTO: 1 K/UL (ref 1–4.8)
LYMPHOCYTES NFR BLD: 6.7 % (ref 18–48)
MAGNESIUM SERPL-MCNC: 1.7 MG/DL (ref 1.6–2.6)
MCH RBC QN AUTO: 28.2 PG (ref 27–31)
MCHC RBC AUTO-ENTMCNC: 33.6 G/DL (ref 32–36)
MCV RBC AUTO: 84 FL (ref 82–98)
MONOCYTES # BLD AUTO: 0.4 K/UL (ref 0.3–1)
MONOCYTES NFR BLD: 2.4 % (ref 4–15)
NEUTROPHILS # BLD AUTO: 12.8 K/UL (ref 1.8–7.7)
NEUTROPHILS NFR BLD: 89 % (ref 38–73)
NRBC BLD-RTO: 0 /100 WBC
PHOSPHATE SERPL-MCNC: 3.8 MG/DL (ref 2.7–4.5)
PLATELET # BLD AUTO: 248 K/UL (ref 150–350)
PMV BLD AUTO: 9.6 FL (ref 9.2–12.9)
POTASSIUM SERPL-SCNC: 3.3 MMOL/L (ref 3.5–5.1)
PROT SERPL-MCNC: 5.4 G/DL (ref 6–8.4)
RBC # BLD AUTO: 2.98 M/UL (ref 4–5.4)
SODIUM SERPL-SCNC: 140 MMOL/L (ref 136–145)
WBC # BLD AUTO: 14.39 K/UL (ref 3.9–12.7)

## 2020-03-07 PROCEDURE — 94761 N-INVAS EAR/PLS OXIMETRY MLT: CPT

## 2020-03-07 PROCEDURE — 25000003 PHARM REV CODE 250: Performed by: INTERNAL MEDICINE

## 2020-03-07 PROCEDURE — 63600175 PHARM REV CODE 636 W HCPCS: Performed by: INTERNAL MEDICINE

## 2020-03-07 PROCEDURE — 84100 ASSAY OF PHOSPHORUS: CPT

## 2020-03-07 PROCEDURE — 86664 EPSTEIN-BARR NUCLEAR ANTIGEN: CPT

## 2020-03-07 PROCEDURE — 99232 SBSQ HOSP IP/OBS MODERATE 35: CPT | Mod: ,,, | Performed by: INTERNAL MEDICINE

## 2020-03-07 PROCEDURE — 85025 COMPLETE CBC W/AUTO DIFF WBC: CPT

## 2020-03-07 PROCEDURE — 36415 COLL VENOUS BLD VENIPUNCTURE: CPT

## 2020-03-07 PROCEDURE — 99232 PR SUBSEQUENT HOSPITAL CARE,LEVL II: ICD-10-PCS | Mod: ,,, | Performed by: INTERNAL MEDICINE

## 2020-03-07 PROCEDURE — 25000003 PHARM REV CODE 250: Performed by: PHYSICIAN ASSISTANT

## 2020-03-07 PROCEDURE — 80053 COMPREHEN METABOLIC PANEL: CPT

## 2020-03-07 PROCEDURE — 11000001 HC ACUTE MED/SURG PRIVATE ROOM

## 2020-03-07 PROCEDURE — 83735 ASSAY OF MAGNESIUM: CPT

## 2020-03-07 RX ORDER — POTASSIUM CHLORIDE 20 MEQ/1
40 TABLET, EXTENDED RELEASE ORAL ONCE
Status: COMPLETED | OUTPATIENT
Start: 2020-03-07 | End: 2020-03-07

## 2020-03-07 RX ORDER — MAGNESIUM SULFATE HEPTAHYDRATE 40 MG/ML
2 INJECTION, SOLUTION INTRAVENOUS ONCE
Status: COMPLETED | OUTPATIENT
Start: 2020-03-07 | End: 2020-03-07

## 2020-03-07 RX ADMIN — AMPICILLIN SODIUM 2 G: 2 INJECTION, POWDER, FOR SOLUTION INTRAVENOUS at 10:03

## 2020-03-07 RX ADMIN — FERROUS SULFATE TAB EC 325 MG (65 MG FE EQUIVALENT) 325 MG: 325 (65 FE) TABLET DELAYED RESPONSE at 09:03

## 2020-03-07 RX ADMIN — MAGNESIUM SULFATE IN WATER 2 G: 40 INJECTION, SOLUTION INTRAVENOUS at 10:03

## 2020-03-07 RX ADMIN — Medication 250 MG: at 09:03

## 2020-03-07 RX ADMIN — CEFTRIAXONE 2 G: 2 INJECTION, SOLUTION INTRAVENOUS at 09:03

## 2020-03-07 RX ADMIN — AMPICILLIN SODIUM 2 G: 2 INJECTION, POWDER, FOR SOLUTION INTRAVENOUS at 11:03

## 2020-03-07 RX ADMIN — AMPICILLIN SODIUM 2 G: 2 INJECTION, POWDER, FOR SOLUTION INTRAVENOUS at 01:03

## 2020-03-07 RX ADMIN — AMPICILLIN SODIUM 2 G: 2 INJECTION, POWDER, FOR SOLUTION INTRAVENOUS at 06:03

## 2020-03-07 RX ADMIN — ACETAMINOPHEN 650 MG: 325 TABLET ORAL at 01:03

## 2020-03-07 RX ADMIN — AMPICILLIN SODIUM 2 G: 2 INJECTION, POWDER, FOR SOLUTION INTRAVENOUS at 03:03

## 2020-03-07 RX ADMIN — ACETAMINOPHEN 650 MG: 325 TABLET ORAL at 06:03

## 2020-03-07 RX ADMIN — ENOXAPARIN SODIUM 40 MG: 100 INJECTION SUBCUTANEOUS at 06:03

## 2020-03-07 RX ADMIN — POTASSIUM CHLORIDE 40 MEQ: 1500 TABLET, EXTENDED RELEASE ORAL at 10:03

## 2020-03-07 NOTE — ASSESSMENT & PLAN NOTE
- Hgb mildly low since 10/2019. No evidence of acute bleed.  - Continuing ferrous sulfate 325mg PO every other day, ascorbic acid 250mg PO every other day.

## 2020-03-07 NOTE — ASSESSMENT & PLAN NOTE
- Meningitis with LP 03/03 demonstrating significant elevation in WBCs, low glucose, and elevated protein.  - Continuing broad coverage with ampicillin 2g IV q4hr, ceftriaxone 2g IV q12hr, vancomycin IV with pharmacy dosing assistance.  - Labs more consistent with bacterial though given duration, feel that viral may be more likely; will discuss with ID.  - CSF culture no growth to date. Blood cultures negative. Repeat culture today. HSV negative; discontinue acyclovir.  - ID consulted; appreciate assistance.

## 2020-03-07 NOTE — CONSULTS
Gastroenterology Consult    3/7/2020  9:10 AM    Consulting Physician:  Zain Lowery MD    Primary Care Provider: Maicol Jensen MD    Reason for consultation: Enlarged liver    HPI:  Melinda Parekh is a 29 y.o. female who presented to the emergency department on March 3, 2020 with a 9 day history of diffuse body aches, fever, chills, and fatigue.  She reports fever at home without a T max of 102° F. as symptoms developed she visited her PCP with negative rapid flu and strep.  She was treated symptomatically for viral illness.  Progressive body aches necessitated an ED visit on 02/27 where she was not noted to have significant lab abnormalities and was discharged home with empiric antibiotic therapy.  Her symptoms continued to worsen with progressive visual disturbances and headaches.  She presented back to the emergency department on 03/03 for further evaluation.  Workup in the ED was noted for a leukocytosis and mild transaminitis.  Hospital medicine was consulted for admission.  See if the studies were done after lumbar puncture showing an elevated white blood cell count, elevated protein, and a low glucose.  Cultures of the CSF fluid including HSV and WNV were performed.  During her hospitalization ultrasound of the abdomen was performed showing hepatosplenomegaly with trace pericholecystic fluid.  CT of the abdomen was done on 03/06/2020.  I personally reviewed this with Radiology this morning where she again was noted to have hepatomegaly, a contracted gallbladder with mild pericholecystic fluid, bibasilar atelectasis, and borderline splenic enlargement.  No acute intra-abdominal process was seen.  Her CSF cultures have shown no growth today.  Blood cultures have all been negative.  Acute viral panel was negative.  HSV testing of CSF fluid was negative.  No West Nile virus was detected on CSF exam.    Of note the patient had a recent vaginal delivery on January 3, 2020 with a an episiotomy and  subsequent infection status post treatment with Augmentin and Flagyl.  No prior known liver disease.    Past Medical History:  Past Medical History:   Diagnosis Date    H/O elbow surgery     Oral contraceptive use        Allergies:   Review of patient's allergies indicates:   Allergen Reactions    No known drug allergies        Current Medications:  Medications Prior to Admission   Medication Sig Dispense Refill Last Dose    norethindrone-ethinyl estradiol-iron (ESTROSTEP FE) 1-20(5)/1-30(7) /1mg-35mcg (9) Tab Take by mouth once daily.       ondansetron (ZOFRAN-ODT) 4 MG TbDL Take 1 tablet (4 mg total) by mouth every 8 (eight) hours as needed. 15 tablet 0          Social History:  Social History     Socioeconomic History    Marital status:      Spouse name: Not on file    Number of children: Not on file    Years of education: Not on file    Highest education level: Not on file   Occupational History     Employer: [employer]   Social Needs    Financial resource strain: Not on file    Food insecurity:     Worry: Not on file     Inability: Not on file    Transportation needs:     Medical: Not on file     Non-medical: Not on file   Tobacco Use    Smoking status: Never Smoker    Smokeless tobacco: Never Used   Substance and Sexual Activity    Alcohol use: Yes    Drug use: No    Sexual activity: Yes     Partners: Male   Lifestyle    Physical activity:     Days per week: Not on file     Minutes per session: Not on file    Stress: Not on file   Relationships    Social connections:     Talks on phone: Not on file     Gets together: Not on file     Attends [employer] service: Not on file     Active member of club or organization: Not on file     Attends meetings of clubs or organizations: Not on file     Relationship status: Not on file   Other Topics Concern    Are you pregnant or think you may be? Not Asked    Breast-feeding Not Asked   Social History Narrative    Not on file        Surgical History:  Past Surgical History:   Procedure Laterality Date    left elbow Left 03/30/2017    torn ligament         Family History:  Family History   Problem Relation Age of Onset    No Known Problems Maternal Grandmother     No Known Problems Sister     Heart attack Maternal Grandfather     No Known Problems Paternal Grandmother     No Known Problems Paternal Grandfather     Skin cancer Other     Breast cancer Other     Colon cancer Neg Hx     Ovarian cancer Neg Hx        Review of systems:     CONSTITUTIONAL: Negative for fever, chills, weakness, weight loss, weight gain.  HEENT: Negative for blurred vision, hearing loss, nasal congestion, dry mouth, sore throat.  CARDIOVASCULAR: Negative for chest pain or palpitations.  RESPIRATORY: Negative for SOB or cough.  GASTROINTESTINAL: See HPI  GENITOURINARY: Negative for dysuria or hematuria.  MUSCULOSKELETAL: Negative for osteoarthritis or muscle pain.  SKIN: Negative for rashes/lesions.  NEUROLOGIC: Negative for headaches, numbness/tingling.  ENDOCRINE: Negative for diabetes or thyroid abnormalities.  HEMATOLOGIC: Negative for anemia or blood dyscrasias.  Aside from above positives, complete 10 point review of systems negative.    Physical Exam:  Vital Signs (Most Recent):  Temp: 99.4 °F (37.4 °C) (03/07/20 0722)  Pulse: (!) 112 (03/07/20 0722)  Resp: 18 (03/07/20 0722)  BP: 122/78 (03/07/20 0722)  SpO2: 98 % (03/07/20 0722) Vital Signs (24h Range):  Temp:  [98.2 °F (36.8 °C)-102.5 °F (39.2 °C)] 99.4 °F (37.4 °C)  Pulse:  [] 112  Resp:  [18-20] 18  SpO2:  [96 %-99 %] 98 %  BP: (122-137)/(76-86) 122/78       General: Well developed, well nourished, female in no acute distress.    Eyes:  Anicteric sclera, PERRLA  ENT:  Moist mucous membranes, no drainage from ears or nose, hearing grossly intact  Lymph:  No cervical, supraclavicular or axillary lymphadenopathy  Neck:  Supple, no nodes or masses felt, no thyromegaly  Cardiovascular:   Regular rate and rhythm without murmur  Lungs:  Clear to auscultation with normal effort; no wheezes or rales noted  GI:  Soft, hepatomegaly, not tender, normal bowel sounds  Musculoskeletal:  5/5 strength bilaterally  Extremities: No clubbing, cyanosis, or edema, 2+ dorsalis pedis bilaterally  Neurologic:  No focal deficits, alert and oriented x 3  Psych:  Appropriate mood and affect  Skin:  No rash, no pallor, no lesions       Labs:  Results for EWELINA LAURENT (MRN [id number]) as of 3/7/2020 09:41   Ref. Range 3/3/2020 12:06 3/4/2020 04:52 3/5/2020 05:08 3/6/2020 04:39 3/7/2020 05:03   WBC Latest Ref Range: 3.90 - 12.70 K/uL 14.50 (H) 9.79 10.85 15.78 (H) 14.39 (H)   RBC Latest Ref Range: 4.00 - 5.40 M/uL 3.60 (L) 2.98 (L) 3.03 (L) 3.28 (L) 2.98 (L)   Hemoglobin Latest Ref Range: 12.0 - 16.0 g/dL 10.3 (L) 8.5 (L) 8.5 (L) 9.3 (L) 8.4 (L)   Hematocrit Latest Ref Range: 37.0 - 48.5 % 29.6 (L) 24.5 (L) 25.3 (L) 27.5 (L) 25.0 (L)   MCV Latest Ref Range: 82 - 98 fL 82 82 84 84 84   MCH Latest Ref Range: 27.0 - 31.0 pg 28.6 28.5 28.1 28.4 28.2   MCHC Latest Ref Range: 32.0 - 36.0 g/dL 34.8 34.7 33.6 33.8 33.6   RDW Latest Ref Range: 11.5 - 14.5 % 11.9 12.0 12.2 12.0 12.2   Platelets Latest Ref Range: 150 - 350 K/uL 235 221 253 272 248   Results for EWELINA LAURENT (MRN [id number]) as of 3/7/2020 09:41   Ref. Range 3/5/2020 05:08   Iron Latest Ref Range: 30 - 160 ug/dL 14 (L)   TIBC Latest Ref Range: 250 - 450 ug/dL 221 (L)   Saturated Iron Latest Ref Range: 20 - 50 % 6 (L)   Transferrin Latest Ref Range: 200 - 375 mg/dL 149 (L)   Ferritin Latest Ref Range: 20.0 - 300.0 ng/mL 130   Folate Latest Ref Range: 4.0 - 24.0 ng/mL 11.1   Vitamin B-12 Latest Ref Range: 210 - 950 pg/mL 1076 (H)   Results for EWELINA LAURENT (MRN [id number]) as of 3/7/2020 09:41   Ref. Range 3/5/2020 05:08   Haptoglobin Latest Ref Range: 30 - 250 mg/dL 288 (H)     Results for EWELINA LAURENT (MRN [id number]) as of 3/7/2020 09:41   Ref. Range 3/5/2020 05:08   Hep A IgM  Latest Ref Range: Negative  Negative   Hep B C IgM Latest Ref Range: Negative  Negative   Hepatitis B Surface Ag Latest Ref Range: Negative  Negative   Haptoglobin Latest Ref Range: 30 - 250 mg/dL 288 (H)   Hepatitis C Ab Latest Ref Range: Negative  Negative   Results for EWELINA LAURENT (MRN [id number]) as of 3/7/2020 09:41   Ref. Range 3/3/2020 16:55   HSV1, PCR, CSF Latest Ref Range: Negative  Negative   HSV2, PCR, CSF Latest Ref Range: Negative  Negative     Imaging and Other Studies:  Personally reviewed  US ABDOMEN LIMITED   Impression       Hepatomegaly.    Borderline splenomegaly.    Trace pericholecystic fluid without evidence for gallstones, gallbladder wall thickening, or sonographic Moran sign.  This is a nonspecific finding.    Official CT report pending, review with radiologist in [facility]    Assessment:  Patient is a 28 yo who presented with 9 days of progressive fever, chills, myalgias with eventual headaches and visual disturbances.  Admitted with meningitis, unclear etiology.  Mild transaminitis noted at admission with hepatomegaly.    Plan:  1.  Agree with ID and submission of EBV studies today.  2.  CT without acute intraabdominal pathology, no abscess.  Hepatomegaly again present, but no prior history of liver disease.  Work up to proceed as outpatient once acute issues resolved  3.  Will follow.    Case discussed with Dr. Saenz.    Zain Lowery

## 2020-03-07 NOTE — SUBJECTIVE & OBJECTIVE
Interval History: Febrile overnight with elevated Tmax. Feeling well at time of exam; reports mild headache, but no other major symptoms.    Review of Systems   Constitutional: Positive for chills and fever.   Respiratory: Negative for cough and shortness of breath.    Cardiovascular: Negative for chest pain and palpitations.   Gastrointestinal: Negative for abdominal pain, nausea and vomiting.     Objective:     Vital Signs (Most Recent):  Temp: 99 °F (37.2 °C) (03/06/20 1745)  Pulse: 99 (03/06/20 1800)  Resp: 18 (03/06/20 1540)  BP: 137/81 (03/06/20 1540)  SpO2: 99 % (03/06/20 1540) Vital Signs (24h Range):  Temp:  [98.2 °F (36.8 °C)-102.9 °F (39.4 °C)] 99 °F (37.2 °C)  Pulse:  [] 99  Resp:  [16-20] 18  SpO2:  [94 %-99 %] 99 %  BP: (129-137)/(67-84) 137/81     Weight: 57.2 kg (126 lb)  Body mass index is 22.32 kg/m².    Intake/Output Summary (Last 24 hours) at 3/6/2020 1903  Last data filed at 3/6/2020 1800  Gross per 24 hour   Intake 3100 ml   Output --   Net 3100 ml      Physical Exam   Constitutional: She is oriented to person, place, and time. She appears well-developed. No distress.   HENT:   Head: Normocephalic and atraumatic.   Eyes: Conjunctivae are normal. Right eye exhibits no discharge. Left eye exhibits no discharge.   Cardiovascular: Normal rate and intact distal pulses.   Pulmonary/Chest: Effort normal. No respiratory distress.   Abdominal: Soft. Bowel sounds are normal. She exhibits no distension. There is no tenderness.   Musculoskeletal: Normal range of motion. She exhibits no edema.   Neurological: She is alert and oriented to person, place, and time.   Skin: Skin is warm and dry.   Nursing note and vitals reviewed.    Significant Labs:   CBC:  Recent Labs   Lab 03/04/20  0452 03/05/20  0508 03/06/20  0439   WBC 9.79 10.85 15.78*   HGB 8.5* 8.5* 9.3*   HCT 24.5* 25.3* 27.5*    253 272   GRAN 85.2*  8.3* 85.5*  9.3* 92.1*  14.6*   LYMPH 8.8*  0.9* 8.6*  0.9* 5.1*  0.8*   MONO  3.9*  0.4 3.8*  0.4 1.2*  0.2*   EOS 0.0 0.0 0.0   BASO 0.02 0.02 0.03      CMP:  Recent Labs   Lab 03/04/20  0452 03/05/20  0508 03/06/20  0439    141 141   K 2.8* 3.3* 4.0    107 105   CO2 23 28 28   BUN 6 3* 4*   CREATININE 0.7 0.7 0.7    109 100   CALCIUM 7.9* 7.9* 8.5*   MG 1.6 1.8 1.7   PHOS 3.0 2.7 3.3   ALKPHOS 98 107 101   AST 20 19 19   ALT 23 21 22   BILITOT 0.2 0.3 0.4   PROT 4.9* 5.4* 5.6*   ALBUMIN 1.7* 1.8* 1.9*   ANIONGAP 9 6* 8      Significant Imaging:   No new imaging this morning.

## 2020-03-07 NOTE — ASSESSMENT & PLAN NOTE
- Mild; unclear etiology. Improved with IVFs. Some reported abdominal discomfort.  - U/S Abd with mild hepatomegaly; acute hepatitis panel in process.  - Resolved.

## 2020-03-07 NOTE — PLAN OF CARE
AAOx4, VSS on RA. Pt free from falls, injury, and skin breakdown. T max 99.4. Continuous cardiac monitoring maintained. Tolerating ordered diet. Voiding spontaneously without difficulty. Ambulating without difficulty. Pt received all scheduled IV antibiotics this shift. Pt took shower today - ok per Dr. Saenz. POC reviewed with pt and all questions answered.  Spouse present at bedside, attentive to pt. Pt has call light in reach, bed brakes on, side rails up x2, bed in low position, SCDs on, IS at bedside, and nonskid socks on.   Purposeful hourly rounding and safety maintained. Cont to monitor.

## 2020-03-07 NOTE — PLAN OF CARE
Pt AAOx4. Afebrile and VSS throughout shift. Poc reviewed with pt and spouse and questions answered. Repositions self independently. Pt is eating and tolerating diet well. Pt is voiding accurately up to toilet. Clear and yellow drainage noted. Cardiac monitoring maintained. Pt c/o of headache this shift. Prn Fioricet given with full relief. Remains free from falls, injury, and skin breakdown. All needs and concerns met. Purposeful rounding done. SCDs in place. Bed is locked and in lowest position, side rails up x2, call light in reach. Pt is resting well and without discomfort at present. Will continue to monitor.

## 2020-03-08 LAB
ALBUMIN SERPL BCP-MCNC: 1.7 G/DL (ref 3.5–5.2)
ALP SERPL-CCNC: 77 U/L (ref 55–135)
ALT SERPL W/O P-5'-P-CCNC: 24 U/L (ref 10–44)
ANION GAP SERPL CALC-SCNC: 11 MMOL/L (ref 8–16)
AST SERPL-CCNC: 24 U/L (ref 10–40)
BACTERIA BLD CULT: NORMAL
BACTERIA BLD CULT: NORMAL
BASOPHILS # BLD AUTO: 0.03 K/UL (ref 0–0.2)
BASOPHILS NFR BLD: 0.2 % (ref 0–1.9)
BILIRUB SERPL-MCNC: 0.2 MG/DL (ref 0.1–1)
BUN SERPL-MCNC: 6 MG/DL (ref 6–20)
CALCIUM SERPL-MCNC: 7.9 MG/DL (ref 8.7–10.5)
CHLORIDE SERPL-SCNC: 104 MMOL/L (ref 95–110)
CO2 SERPL-SCNC: 24 MMOL/L (ref 23–29)
CREAT SERPL-MCNC: 0.6 MG/DL (ref 0.5–1.4)
DIFFERENTIAL METHOD: ABNORMAL
EOSINOPHIL # BLD AUTO: 0.1 K/UL (ref 0–0.5)
EOSINOPHIL NFR BLD: 0.6 % (ref 0–8)
ERYTHROCYTE [DISTWIDTH] IN BLOOD BY AUTOMATED COUNT: 12.1 % (ref 11.5–14.5)
EST. GFR  (AFRICAN AMERICAN): >60 ML/MIN/1.73 M^2
EST. GFR  (NON AFRICAN AMERICAN): >60 ML/MIN/1.73 M^2
GLUCOSE SERPL-MCNC: 94 MG/DL (ref 70–110)
HCT VFR BLD AUTO: 24.7 % (ref 37–48.5)
HGB BLD-MCNC: 8.1 G/DL (ref 12–16)
IMM GRANULOCYTES # BLD AUTO: 0.27 K/UL (ref 0–0.04)
IMM GRANULOCYTES NFR BLD AUTO: 2.1 % (ref 0–0.5)
LYMPHOCYTES # BLD AUTO: 0.9 K/UL (ref 1–4.8)
LYMPHOCYTES NFR BLD: 7.2 % (ref 18–48)
MAGNESIUM SERPL-MCNC: 2.1 MG/DL (ref 1.6–2.6)
MCH RBC QN AUTO: 27.6 PG (ref 27–31)
MCHC RBC AUTO-ENTMCNC: 32.8 G/DL (ref 32–36)
MCV RBC AUTO: 84 FL (ref 82–98)
MONOCYTES # BLD AUTO: 0.3 K/UL (ref 0.3–1)
MONOCYTES NFR BLD: 2.1 % (ref 4–15)
NEUTROPHILS # BLD AUTO: 11.2 K/UL (ref 1.8–7.7)
NEUTROPHILS NFR BLD: 87.8 % (ref 38–73)
NRBC BLD-RTO: 0 /100 WBC
PHOSPHATE SERPL-MCNC: 4.2 MG/DL (ref 2.7–4.5)
PLATELET # BLD AUTO: 272 K/UL (ref 150–350)
PMV BLD AUTO: 9.5 FL (ref 9.2–12.9)
POTASSIUM SERPL-SCNC: 3.6 MMOL/L (ref 3.5–5.1)
PROT SERPL-MCNC: 5.3 G/DL (ref 6–8.4)
RBC # BLD AUTO: 2.93 M/UL (ref 4–5.4)
S PNEUM AG UR QL: NOT DETECTED
SODIUM SERPL-SCNC: 139 MMOL/L (ref 136–145)
WBC # BLD AUTO: 12.7 K/UL (ref 3.9–12.7)

## 2020-03-08 PROCEDURE — 99900035 HC TECH TIME PER 15 MIN (STAT)

## 2020-03-08 PROCEDURE — 85025 COMPLETE CBC W/AUTO DIFF WBC: CPT

## 2020-03-08 PROCEDURE — 11000001 HC ACUTE MED/SURG PRIVATE ROOM

## 2020-03-08 PROCEDURE — 99232 PR SUBSEQUENT HOSPITAL CARE,LEVL II: ICD-10-PCS | Mod: ,,, | Performed by: INTERNAL MEDICINE

## 2020-03-08 PROCEDURE — 63600175 PHARM REV CODE 636 W HCPCS: Performed by: INTERNAL MEDICINE

## 2020-03-08 PROCEDURE — 84100 ASSAY OF PHOSPHORUS: CPT

## 2020-03-08 PROCEDURE — 94761 N-INVAS EAR/PLS OXIMETRY MLT: CPT

## 2020-03-08 PROCEDURE — 80053 COMPREHEN METABOLIC PANEL: CPT

## 2020-03-08 PROCEDURE — 36415 COLL VENOUS BLD VENIPUNCTURE: CPT

## 2020-03-08 PROCEDURE — 25000003 PHARM REV CODE 250: Performed by: PHYSICIAN ASSISTANT

## 2020-03-08 PROCEDURE — 99232 SBSQ HOSP IP/OBS MODERATE 35: CPT | Mod: ,,, | Performed by: INTERNAL MEDICINE

## 2020-03-08 PROCEDURE — 83735 ASSAY OF MAGNESIUM: CPT

## 2020-03-08 RX ADMIN — AMPICILLIN SODIUM 2 G: 2 INJECTION, POWDER, FOR SOLUTION INTRAVENOUS at 06:03

## 2020-03-08 RX ADMIN — CEFTRIAXONE 2 G: 2 INJECTION, SOLUTION INTRAVENOUS at 09:03

## 2020-03-08 RX ADMIN — ACETAMINOPHEN 650 MG: 325 TABLET ORAL at 09:03

## 2020-03-08 RX ADMIN — ACETAMINOPHEN 650 MG: 325 TABLET ORAL at 03:03

## 2020-03-08 RX ADMIN — AMPICILLIN SODIUM 2 G: 2 INJECTION, POWDER, FOR SOLUTION INTRAVENOUS at 05:03

## 2020-03-08 RX ADMIN — ENOXAPARIN SODIUM 40 MG: 100 INJECTION SUBCUTANEOUS at 05:03

## 2020-03-08 RX ADMIN — AMPICILLIN SODIUM 2 G: 2 INJECTION, POWDER, FOR SOLUTION INTRAVENOUS at 10:03

## 2020-03-08 RX ADMIN — AMPICILLIN SODIUM 2 G: 2 INJECTION, POWDER, FOR SOLUTION INTRAVENOUS at 02:03

## 2020-03-08 NOTE — PROGRESS NOTES
[facility] Hospital Medicine  Progress Note    Patient Name: Melinda Parekh  MRN: [id number]  Patient Class: IP- Inpatient   Admission Date: 3/3/2020  Length of Stay: 5 days  Attending Physician: EMY Saenz MD  Primary Care Provider: Maicol Jensen MD        Subjective:     Principal Problem:Meningitis        HPI:  Ms. Parekh is a 29/F with PMH significant for recent vaginal delivery 01/03/20 with episiotomy, subsequent infection with prevotella spp. s/p treatment with amoxicillin-clavulanate and metronidazole who presented to ED 03/03 with a reported 9 day history of illness. She reports symptoms began with diffuse body aches, fever, chills, and fatigue. Tmax at home has been up to 102F. Subsequently developed diarrhea with two episodes daily. As symptoms persisted she visited her PCP 02/26; rapid flu and strep were negative and she was presumed to have gastroenteritis and was treated symptomatically. With progressive body aches presented to ED 02/27 and had nausea/vomiting in addition. Evaluation at that time was notable for no significant leukocytosis, equivocal UA (urine culture no growth), no CK elevation, tachycardia that resolved with fluids and she was subsequently discharged home; empiric antibiotic therapy was held at that time. Upon return home her symptoms continued to worsen. She then developed dizziness, visual disturbance and headache; denied neck pain or stiffness. She subsequently presented to ED 03/03 for further evaluation. ED workup was notable for leukocytosis, Tmax 100.4F, mild transaminitis. Hospital medicine was contacted for admission.    Overview/Hospital Course:  Admitted and CSF studies most concerning for bacterial meningitis. 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 CSF culture no growth and blood cultures negative. Had repeat fevers on 03/06; discussed with ID, suspect likely viral given broad spectrum antibiotic use, but  will continue empiric treatment for bacterial for a total 10 day course. Had some abdominal pain and mild transaminitis on presentation. U/S with hepatosplenomegaly. Discussed with GI and will follow-up in clinic.    Interval History: No acute events overnight. Temperature mildly elevated; feels warm, but no true fever. Has been using some tylenol for headaches though. Has some cough with incentive spirometry.    Review of Systems   Constitutional: Positive for fever (subjective). Negative for chills.   Respiratory: Positive for cough (with IS use). Negative for shortness of breath.    Cardiovascular: Negative for chest pain and palpitations.   Gastrointestinal: Negative for abdominal pain, nausea and vomiting.     Objective:     Vital Signs (Most Recent):  Temp: 99.4 °F (37.4 °C) (03/08/20 0906)  Pulse: 110 (03/08/20 0906)  Resp: 18 (03/08/20 0906)  BP: 131/86 (03/08/20 0906)  SpO2: 96 % (03/08/20 0906) Vital Signs (24h Range):  Temp:  [98.4 °F (36.9 °C)-99.6 °F (37.6 °C)] 99.4 °F (37.4 °C)  Pulse:  [] 110  Resp:  [16-20] 18  SpO2:  [88 %-99 %] 96 %  BP: (121-131)/(74-87) 131/86     Weight: 57.2 kg (126 lb)  Body mass index is 22.32 kg/m².    Intake/Output Summary (Last 24 hours) at 3/8/2020 1203  Last data filed at 3/8/2020 0430  Gross per 24 hour   Intake 120 ml   Output 1300 ml   Net -1180 ml      Physical Exam   Constitutional: She is oriented to person, place, and time. She appears well-developed. No distress.   HENT:   Head: Normocephalic and atraumatic.   Eyes: Conjunctivae are normal. Right eye exhibits no discharge. Left eye exhibits no discharge.   Cardiovascular: Normal rate and intact distal pulses.   Pulmonary/Chest: Effort normal. No respiratory distress.   Trace bibasilar crackles consistent with atelectasis.   Abdominal: Soft. Bowel sounds are normal. She exhibits no distension. There is no tenderness.   Musculoskeletal: Normal range of motion. She exhibits no edema.   Neurological: She is  alert and oriented to person, place, and time.   Skin: Skin is warm and dry.   Nursing note and vitals reviewed.    Significant Labs:   CBC:  Recent Labs   Lab 03/06/20  0439 03/07/20  0503 03/08/20  0612   WBC 15.78* 14.39* 12.70   HGB 9.3* 8.4* 8.1*   HCT 27.5* 25.0* 24.7*    248 272   GRAN 92.1*  14.6* 89.0*  12.8* 87.8*  11.2*   LYMPH 5.1*  0.8* 6.7*  1.0 7.2*  0.9*   MONO 1.2*  0.2* 2.4*  0.4 2.1*  0.3   EOS 0.0 0.1 0.1   BASO 0.03 0.03 0.03      CMP:  Recent Labs   Lab 03/06/20 0439 03/07/20  0504 03/08/20  0612    140 139   K 4.0 3.3* 3.6    104 104   CO2 28 27 24   BUN 4* 6 6   CREATININE 0.7 0.7 0.6    92 94   CALCIUM 8.5* 8.0* 7.9*   MG 1.7 1.7 2.1   PHOS 3.3 3.8 4.2   ALKPHOS 101 94 77   AST 19 23 24   ALT 22 24 24   BILITOT 0.4 0.3 0.2   PROT 5.6* 5.4* 5.3*   ALBUMIN 1.9* 1.8* 1.7*   ANIONGAP 8 9 11      Significant Imaging:   No new imaging this morning.      Assessment/Plan:      * Meningitis  - Meningitis with LP 03/03 demonstrating significant elevation in WBCs, low glucose, and elevated protein.  - Continuing broad coverage with ampicillin 2g IV q4hr, ceftriaxone 2g IV q12hr.  - Labs more consistent with bacterial though given duration, feel that viral may be more likely. Will treat empirically for bacterial for a 10 day total course.  - CSF culture no growth to date. Blood cultures negative. Repeat cultures in process, no growth to date.  - ID consulted; appreciate assistance.    Iron deficiency anemia  - Hgb mildly low since 10/2019. No evidence of acute bleed.  - Continuing ferrous sulfate 325mg PO every other day, ascorbic acid 250mg PO every other day.    Transaminitis  - Mild; unclear etiology. Improved with IVFs. Some reported abdominal discomfort.  - U/S Abd with mild hepatomegaly; acute hepatitis panel negative.  - EBV pending.  - Will follow-up with GI in the outpatient setting.    Leukocytosis  - As under meningitis.    VTE Risk Mitigation (From  admission, onward)         Ordered     enoxaparin injection 40 mg  Daily      03/03/20 1645     IP VTE HIGH RISK PATIENT  Once      03/03/20 1645     Place sequential compression device  Until discontinued      03/03/20 1645                      FAUSTO Saenz MD  Department of Hospital Medicine   [facility]

## 2020-03-08 NOTE — NURSING
Dr. Saenz notified via phone of temp of 102.4 . Stated, give ice packs and take temp again an hour. Will continue to assess.

## 2020-03-08 NOTE — ASSESSMENT & PLAN NOTE
- Meningitis with LP 03/03 demonstrating significant elevation in WBCs, low glucose, and elevated protein.  - Continuing broad coverage with ampicillin 2g IV q4hr, ceftriaxone 2g IV q12hr.  - Labs more consistent with bacterial though given duration, feel that viral may be more likely; will discuss with ID.  - CSF culture no growth to date. Blood cultures negative. Repeat cultures in process. EBV pending.  - ID consulted; appreciate assistance.

## 2020-03-08 NOTE — ASSESSMENT & PLAN NOTE
- Meningitis with LP 03/03 demonstrating significant elevation in WBCs, low glucose, and elevated protein.  - Continuing broad coverage with ampicillin 2g IV q4hr, ceftriaxone 2g IV q12hr.  - Labs more consistent with bacterial though given duration, feel that viral may be more likely. Will treat empirically for bacterial for a 10 day total course.  - CSF culture no growth to date. Blood cultures negative. Repeat cultures in process, no growth to date.  - ID consulted; appreciate assistance.

## 2020-03-08 NOTE — SUBJECTIVE & OBJECTIVE
Interval History: No acute events overnight. Temperature mildly elevated; feels warm, but no true fever. Has been using some tylenol for headaches though. Has some cough with incentive spirometry.    Review of Systems   Constitutional: Positive for fever (subjective). Negative for chills.   Respiratory: Positive for cough (with IS use). Negative for shortness of breath.    Cardiovascular: Negative for chest pain and palpitations.   Gastrointestinal: Negative for abdominal pain, nausea and vomiting.     Objective:     Vital Signs (Most Recent):  Temp: 99.4 °F (37.4 °C) (03/08/20 0906)  Pulse: 110 (03/08/20 0906)  Resp: 18 (03/08/20 0906)  BP: 131/86 (03/08/20 0906)  SpO2: 96 % (03/08/20 0906) Vital Signs (24h Range):  Temp:  [98.4 °F (36.9 °C)-99.6 °F (37.6 °C)] 99.4 °F (37.4 °C)  Pulse:  [] 110  Resp:  [16-20] 18  SpO2:  [88 %-99 %] 96 %  BP: (121-131)/(74-87) 131/86     Weight: 57.2 kg (126 lb)  Body mass index is 22.32 kg/m².    Intake/Output Summary (Last 24 hours) at 3/8/2020 1203  Last data filed at 3/8/2020 0430  Gross per 24 hour   Intake 120 ml   Output 1300 ml   Net -1180 ml      Physical Exam   Constitutional: She is oriented to person, place, and time. She appears well-developed. No distress.   HENT:   Head: Normocephalic and atraumatic.   Eyes: Conjunctivae are normal. Right eye exhibits no discharge. Left eye exhibits no discharge.   Cardiovascular: Normal rate and intact distal pulses.   Pulmonary/Chest: Effort normal. No respiratory distress.   Trace bibasilar crackles consistent with atelectasis.   Abdominal: Soft. Bowel sounds are normal. She exhibits no distension. There is no tenderness.   Musculoskeletal: Normal range of motion. She exhibits no edema.   Neurological: She is alert and oriented to person, place, and time.   Skin: Skin is warm and dry.   Nursing note and vitals reviewed.    Significant Labs:   CBC:  Recent Labs   Lab 03/06/20  0439 03/07/20  0503 03/08/20  0612   WBC 15.78*  14.39* 12.70   HGB 9.3* 8.4* 8.1*   HCT 27.5* 25.0* 24.7*    248 272   GRAN 92.1*  14.6* 89.0*  12.8* 87.8*  11.2*   LYMPH 5.1*  0.8* 6.7*  1.0 7.2*  0.9*   MONO 1.2*  0.2* 2.4*  0.4 2.1*  0.3   EOS 0.0 0.1 0.1   BASO 0.03 0.03 0.03      CMP:  Recent Labs   Lab 03/06/20  0439 03/07/20  0504 03/08/20  0612    140 139   K 4.0 3.3* 3.6    104 104   CO2 28 27 24   BUN 4* 6 6   CREATININE 0.7 0.7 0.6    92 94   CALCIUM 8.5* 8.0* 7.9*   MG 1.7 1.7 2.1   PHOS 3.3 3.8 4.2   ALKPHOS 101 94 77   AST 19 23 24   ALT 22 24 24   BILITOT 0.4 0.3 0.2   PROT 5.6* 5.4* 5.3*   ALBUMIN 1.9* 1.8* 1.7*   ANIONGAP 8 9 11      Significant Imaging:   No new imaging this morning.

## 2020-03-08 NOTE — SUBJECTIVE & OBJECTIVE
Interval History: Feeling well this morning. No new concerns.    Review of Systems   Constitutional: Positive for fever. Negative for chills.   Respiratory: Negative for cough and shortness of breath.    Cardiovascular: Negative for chest pain and palpitations.   Gastrointestinal: Negative for abdominal pain, nausea and vomiting.     Objective:     Vital Signs (Most Recent):  Temp: 98.4 °F (36.9 °C) (03/07/20 1607)  Pulse: (!) 126 (03/07/20 1610)  Resp: 18 (03/07/20 1607)  BP: 126/74 (03/07/20 1607)  SpO2: 98 % (03/07/20 1607) Vital Signs (24h Range):  Temp:  [98.2 °F (36.8 °C)-102.5 °F (39.2 °C)] 98.4 °F (36.9 °C)  Pulse:  [] 126  Resp:  [18-20] 18  SpO2:  [96 %-99 %] 98 %  BP: (122-134)/(72-86) 126/74     Weight: 57.2 kg (126 lb)  Body mass index is 22.32 kg/m².    Intake/Output Summary (Last 24 hours) at 3/7/2020 1825  Last data filed at 3/7/2020 1450  Gross per 24 hour   Intake 1560 ml   Output 2375 ml   Net -815 ml      Physical Exam   Constitutional: She is oriented to person, place, and time. She appears well-developed. No distress.   HENT:   Head: Normocephalic and atraumatic.   Eyes: Conjunctivae are normal. Right eye exhibits no discharge. Left eye exhibits no discharge.   Cardiovascular: Normal rate and intact distal pulses.   Pulmonary/Chest: Effort normal. No respiratory distress.   Abdominal: Soft. Bowel sounds are normal. She exhibits no distension. There is no tenderness.   Musculoskeletal: Normal range of motion. She exhibits no edema.   Neurological: She is alert and oriented to person, place, and time.   Skin: Skin is warm and dry.   Nursing note and vitals reviewed.    Significant Labs:   CBC:  Recent Labs   Lab 03/05/20  0508 03/06/20  0439 03/07/20  0503   WBC 10.85 15.78* 14.39*   HGB 8.5* 9.3* 8.4*   HCT 25.3* 27.5* 25.0*    272 248   GRAN 85.5*  9.3* 92.1*  14.6* 89.0*  12.8*   LYMPH 8.6*  0.9* 5.1*  0.8* 6.7*  1.0   MONO 3.8*  0.4 1.2*  0.2* 2.4*  0.4   EOS 0.0 0.0 0.1    BASO 0.02 0.03 0.03      CMP:  Recent Labs   Lab 03/05/20  0508 03/06/20  0439 03/07/20  0504    141 140   K 3.3* 4.0 3.3*    105 104   CO2 28 28 27   BUN 3* 4* 6   CREATININE 0.7 0.7 0.7    100 92   CALCIUM 7.9* 8.5* 8.0*   MG 1.8 1.7 1.7   PHOS 2.7 3.3 3.8   ALKPHOS 107 101 94   AST 19 19 23   ALT 21 22 24   BILITOT 0.3 0.4 0.3   PROT 5.4* 5.6* 5.4*   ALBUMIN 1.8* 1.9* 1.8*   ANIONGAP 6* 8 9      Significant Imaging:   No new imaging this morning.

## 2020-03-08 NOTE — PROGRESS NOTES
Gastroenterology Progress Note    Active Hospital Problems    *Meningitis      Leukocytosis      Transaminitis      Iron deficiency anemia        Subjective:  Patient seen and examined.  The patient is doing well this AM, no issues.  No temp spikes overnight.  WBC has improved.    Reviews of Systems:  General:  Negative for fever or chills  Cardiovascular:  Negative for chest pain, shortness of breath, palpitations    Physical Exam    Vitals:  Vital Signs (Most Recent):  Temp: 99.4 °F (37.4 °C) (03/08/20 0906)  Pulse: 110 (03/08/20 0906)  Resp: 18 (03/08/20 0906)  BP: 131/86 (03/08/20 0906)  SpO2: 96 % (03/08/20 0906) Vital Signs (24h Range):  Temp:  [98.4 °F (36.9 °C)-99.6 °F (37.6 °C)] 99.4 °F (37.4 °C)  Pulse:  [] 110  Resp:  [16-20] 18  SpO2:  [88 %-99 %] 96 %  BP: (121-131)/(72-87) 131/86     GEN: Well developed, well nourished in no apparent distress   HENT: Normocephalic, anicteric sclera   Cardiovascular: Regular rate and rhythm. No murmurs appreciated.   Chest: Non-labored respirations. Breath sounds equal   Abdomen: soft, hepatomegaly, normal bowel sounds, no masses  Psych: Appropriate mood and affect.   Extermities: No C/C/E. 2+ dorsalis pedis pulses bilaterally  Skin: No new visible or palpable lesions.      Medications/Infusions:  Current Facility-Administered Medications   Medication Dose Route Frequency Provider Last Rate Last Dose    acetaminophen tablet 650 mg  650 mg Oral Q6H PRN Shona Dong PA-C   650 mg at 03/07/20 1344    ampicillin 2 g in sodium chloride 0.9 % 100 mL IVPB (ready to mix system)  2 g Intravenous Q4H DRonaldo Saenz  mL/hr at 03/08/20 0623 2 g at 03/08/20 0623    ferrous sulfate EC tablet 325 mg  325 mg Oral Every other day EMY Saenz MD   325 mg at 03/07/20 0911    And    ascorbic acid (vitamin C) tablet 250 mg  250 mg Oral Every other day EMY Saenz MD   250 mg at 03/07/20 0911    butalbital-acetaminophen-caffeine -40 mg per tablet 1  tablet  1 tablet Oral Q6H PRN EMY Saenz MD   1 tablet at 03/06/20 2220    cefTRIAXone (ROCEPHIN) 2 g in dextrose 5 % 50 mL IVPB  2 g Intravenous Q12H EMY Saenz MD   2 g at 03/07/20 2106    enoxaparin injection 40 mg  40 mg Subcutaneous Daily EMY Saenz MD   40 mg at 03/07/20 1810    ondansetron disintegrating tablet 4 mg  4 mg Oral Q8H PRN EMY Saenz MD        ondansetron injection 4 mg  4 mg Intravenous Q8H PRN EMY Saenz MD        polyethylene glycol packet 17 g  17 g Oral Daily EMY Saenz MD   17 g at 03/05/20 1518    senna-docusate 8.6-50 mg per tablet 1 tablet  1 tablet Oral BID PRN EMY Saenz MD   1 tablet at 03/05/20 2055    sodium chloride 0.9% flush 5 mL  5 mL Intravenous PRN EMY Saenz MD           Intake and Output:    Intake/Output Summary (Last 24 hours) at 3/8/2020 0909  Last data filed at 3/7/2020 1450  Gross per 24 hour   Intake 360 ml   Output 1000 ml   Net -640 ml       Labs:  Results for EWELINA LAURENT (MRN [id number]) as of 3/8/2020 09:10   Ref. Range 3/8/2020 06:12   WBC Latest Ref Range: 3.90 - 12.70 K/uL 12.70   RBC Latest Ref Range: 4.00 - 5.40 M/uL 2.93 (L)   Hemoglobin Latest Ref Range: 12.0 - 16.0 g/dL 8.1 (L)   Hematocrit Latest Ref Range: 37.0 - 48.5 % 24.7 (L)   MCV Latest Ref Range: 82 - 98 fL 84   MCH Latest Ref Range: 27.0 - 31.0 pg 27.6   MCHC Latest Ref Range: 32.0 - 36.0 g/dL 32.8   RDW Latest Ref Range: 11.5 - 14.5 % 12.1   Platelets Latest Ref Range: 150 - 350 K/uL 272   Results for EWELINA LAURENT (MRN [id number]) as of 3/8/2020 09:10   Ref. Range 3/8/2020 06:12   Alkaline Phosphatase Latest Ref Range: 55 - 135 U/L 77   PROTEIN TOTAL Latest Ref Range: 6.0 - 8.4 g/dL 5.3 (L)   Albumin Latest Ref Range: 3.5 - 5.2 g/dL 1.7 (L)   BILIRUBIN TOTAL Latest Ref Range: 0.1 - 1.0 mg/dL 0.2   AST Latest Ref Range: 10 - 40 U/L 24   ALT Latest Ref Range: 10 - 44 U/L 24         Imaging and other studies:    No new    Assessment:    Patient is a  28 yo who presented with 9 days of progressive fever, chills, myalgias with eventual headaches and visual disturbances.  Admitted with meningitis, unclear etiology.  Mild transaminitis noted at admission with hepatomegaly    Plan:    1.  Follow up EBV panel  2.  CT final report with no acute intra abdominal process  3.  Follow up with GI as outpatient.    Discussed with Dr. Saenz

## 2020-03-08 NOTE — ASSESSMENT & PLAN NOTE
- Mild; unclear etiology. Improved with IVFs. Some reported abdominal discomfort.  - U/S Abd with mild hepatomegaly; acute hepatitis panel negative.  - EBV pending.  - Will follow-up with GI in the outpatient setting.

## 2020-03-08 NOTE — PROGRESS NOTES
[facility] Hospital Medicine  Progress Note    Patient Name: Melinda Parekh  MRN: [id number]  Patient Class: IP- Inpatient   Admission Date: 3/3/2020  Length of Stay: 4 days  Attending Physician: EMY Saenz MD  Primary Care Provider: Maicol Jensen MD        Subjective:     Principal Problem:Meningitis        HPI:  Ms. Parekh is a 29/F with PMH significant for recent vaginal delivery 01/03/20 with episiotomy, subsequent infection with prevotella spp. s/p treatment with amoxicillin-clavulanate and metronidazole who presented to ED 03/03 with a reported 9 day history of illness. She reports symptoms began with diffuse body aches, fever, chills, and fatigue. Tmax at home has been up to 102F. Subsequently developed diarrhea with two episodes daily. As symptoms persisted she visited her PCP 02/26; rapid flu and strep were negative and she was presumed to have gastroenteritis and was treated symptomatically. With progressive body aches presented to ED 02/27 and had nausea/vomiting in addition. Evaluation at that time was notable for no significant leukocytosis, equivocal UA (urine culture no growth), no CK elevation, tachycardia that resolved with fluids and she was subsequently discharged home; empiric antibiotic therapy was held at that time. Upon return home her symptoms continued to worsen. She then developed dizziness, visual disturbance and headache; denied neck pain or stiffness. She subsequently presented to ED 03/03 for further evaluation. ED workup was notable for leukocytosis, Tmax 100.4F, mild transaminitis. Hospital medicine was contacted for admission.    Overview/Hospital Course:  No notes on file    Interval History: Feeling well this morning. No new concerns.    Review of Systems   Constitutional: Positive for fever. Negative for chills.   Respiratory: Negative for cough and shortness of breath.    Cardiovascular: Negative for chest pain and palpitations.   Gastrointestinal:  Negative for abdominal pain, nausea and vomiting.     Objective:     Vital Signs (Most Recent):  Temp: 98.4 °F (36.9 °C) (03/07/20 1607)  Pulse: (!) 126 (03/07/20 1610)  Resp: 18 (03/07/20 1607)  BP: 126/74 (03/07/20 1607)  SpO2: 98 % (03/07/20 1607) Vital Signs (24h Range):  Temp:  [98.2 °F (36.8 °C)-102.5 °F (39.2 °C)] 98.4 °F (36.9 °C)  Pulse:  [] 126  Resp:  [18-20] 18  SpO2:  [96 %-99 %] 98 %  BP: (122-134)/(72-86) 126/74     Weight: 57.2 kg (126 lb)  Body mass index is 22.32 kg/m².    Intake/Output Summary (Last 24 hours) at 3/7/2020 1825  Last data filed at 3/7/2020 1450  Gross per 24 hour   Intake 1560 ml   Output 2375 ml   Net -815 ml      Physical Exam   Constitutional: She is oriented to person, place, and time. She appears well-developed. No distress.   HENT:   Head: Normocephalic and atraumatic.   Eyes: Conjunctivae are normal. Right eye exhibits no discharge. Left eye exhibits no discharge.   Cardiovascular: Normal rate and intact distal pulses.   Pulmonary/Chest: Effort normal. No respiratory distress.   Abdominal: Soft. Bowel sounds are normal. She exhibits no distension. There is no tenderness.   Musculoskeletal: Normal range of motion. She exhibits no edema.   Neurological: She is alert and oriented to person, place, and time.   Skin: Skin is warm and dry.   Nursing note and vitals reviewed.    Significant Labs:   CBC:  Recent Labs   Lab 03/05/20  0508 03/06/20  0439 03/07/20  0503   WBC 10.85 15.78* 14.39*   HGB 8.5* 9.3* 8.4*   HCT 25.3* 27.5* 25.0*    272 248   GRAN 85.5*  9.3* 92.1*  14.6* 89.0*  12.8*   LYMPH 8.6*  0.9* 5.1*  0.8* 6.7*  1.0   MONO 3.8*  0.4 1.2*  0.2* 2.4*  0.4   EOS 0.0 0.0 0.1   BASO 0.02 0.03 0.03      CMP:  Recent Labs   Lab 03/05/20  0508 03/06/20  0439 03/07/20  0504    141 140   K 3.3* 4.0 3.3*    105 104   CO2 28 28 27   BUN 3* 4* 6   CREATININE 0.7 0.7 0.7    100 92   CALCIUM 7.9* 8.5* 8.0*   MG 1.8 1.7 1.7   PHOS 2.7 3.3 3.8    ALKPHOS 107 101 94   AST 19 19 23   ALT 21 22 24   BILITOT 0.3 0.4 0.3   PROT 5.4* 5.6* 5.4*   ALBUMIN 1.8* 1.9* 1.8*   ANIONGAP 6* 8 9      Significant Imaging:   No new imaging this morning.      Assessment/Plan:      * Meningitis  - Meningitis with LP 03/03 demonstrating significant elevation in WBCs, low glucose, and elevated protein.  - Continuing broad coverage with ampicillin 2g IV q4hr, ceftriaxone 2g IV q12hr.  - Labs more consistent with bacterial though given duration, feel that viral may be more likely; will discuss with ID.  - CSF culture no growth to date. Blood cultures negative. Repeat cultures in process. EBV pending.  - ID consulted; appreciate assistance.    Iron deficiency anemia  - Hgb mildly low since 10/2019. No evidence of acute bleed.  - Continuing ferrous sulfate 325mg PO every other day, ascorbic acid 250mg PO every other day.    Transaminitis  - Mild; unclear etiology. Improved with IVFs. Some reported abdominal discomfort.  - U/S Abd with mild hepatomegaly; acute hepatitis panel in process.  - Resolved.    Leukocytosis  - As under meningitis.    VTE Risk Mitigation (From admission, onward)         Ordered     enoxaparin injection 40 mg  Daily      03/03/20 1645     IP VTE HIGH RISK PATIENT  Once      03/03/20 1645     Place sequential compression device  Until discontinued      03/03/20 1645                      FAUSTO Saenz MD  Department of Hospital Medicine   [facility]

## 2020-03-08 NOTE — HOSPITAL COURSE
Admitted and CSF studies most concerning for bacterial meningitis. 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 CSF culture no growth and blood cultures negative. Had repeat fevers on 03/06; discussed with ID, suspect likely viral given broad spectrum antibiotic use, but will continue empiric treatment for bacterial for a total 10 day course. Had some abdominal pain and mild transaminitis on presentation. U/S with hepatosplenomegaly. Discussed with GI and will follow-up in clinic. She continued to have low grade fever but this improved as well, although she continued to have elevated temperature associated with sweats and tachycardia. All viral and bacterial studies were negative. She had slow improvement in symptoms and received 8 days of empiric Rocephin. She continued to have leukocytosis without etiology. Rheumatologic studies were ordered but pending at the time of discharge. She is stable for discharge home today and will f/u with her PCP for f/u on blood tests. Given the acuity of her symptoms, its unlikely that her symptoms are rheumatologic.

## 2020-03-09 LAB
ANION GAP SERPL CALC-SCNC: 8 MMOL/L (ref 8–16)
BACTERIA CSF CULT: NO GROWTH
BASOPHILS # BLD AUTO: 0.02 K/UL (ref 0–0.2)
BASOPHILS NFR BLD: 0.1 % (ref 0–1.9)
BUN SERPL-MCNC: 6 MG/DL (ref 6–20)
CALCIUM SERPL-MCNC: 8.1 MG/DL (ref 8.7–10.5)
CHLORIDE SERPL-SCNC: 105 MMOL/L (ref 95–110)
CO2 SERPL-SCNC: 26 MMOL/L (ref 23–29)
CREAT SERPL-MCNC: 0.6 MG/DL (ref 0.5–1.4)
DIFFERENTIAL METHOD: ABNORMAL
ENTEROVIRUS: NOT DETECTED
EOSINOPHIL # BLD AUTO: 0.1 K/UL (ref 0–0.5)
EOSINOPHIL NFR BLD: 0.4 % (ref 0–8)
ERYTHROCYTE [DISTWIDTH] IN BLOOD BY AUTOMATED COUNT: 11.9 % (ref 11.5–14.5)
EST. GFR  (AFRICAN AMERICAN): >60 ML/MIN/1.73 M^2
EST. GFR  (NON AFRICAN AMERICAN): >60 ML/MIN/1.73 M^2
GLUCOSE SERPL-MCNC: 97 MG/DL (ref 70–110)
GRAM STN SPEC: NORMAL
HCT VFR BLD AUTO: 24 % (ref 37–48.5)
HGB BLD-MCNC: 7.9 G/DL (ref 12–16)
HUMAN BOCAVIRUS: NOT DETECTED
HUMAN CORONAVIRUS, COMMON COLD VIRUS: NOT DETECTED
IMM GRANULOCYTES # BLD AUTO: 0.29 K/UL (ref 0–0.04)
IMM GRANULOCYTES NFR BLD AUTO: 2.2 % (ref 0–0.5)
INFLUENZA A - H1N1-09: NOT DETECTED
LYMPHOCYTES # BLD AUTO: 0.9 K/UL (ref 1–4.8)
LYMPHOCYTES NFR BLD: 6.7 % (ref 18–48)
MCH RBC QN AUTO: 28.2 PG (ref 27–31)
MCHC RBC AUTO-ENTMCNC: 32.9 G/DL (ref 32–36)
MCV RBC AUTO: 86 FL (ref 82–98)
MONOCYTES # BLD AUTO: 0.3 K/UL (ref 0.3–1)
MONOCYTES NFR BLD: 1.9 % (ref 4–15)
NEUTROPHILS # BLD AUTO: 11.9 K/UL (ref 1.8–7.7)
NEUTROPHILS NFR BLD: 88.7 % (ref 38–73)
NRBC BLD-RTO: 0 /100 WBC
PARAINFLUENZA: NOT DETECTED
PLATELET # BLD AUTO: 288 K/UL (ref 150–350)
PMV BLD AUTO: 9.7 FL (ref 9.2–12.9)
POTASSIUM SERPL-SCNC: 3.9 MMOL/L (ref 3.5–5.1)
RBC # BLD AUTO: 2.8 M/UL (ref 4–5.4)
RVP - ADENOVIRUS: NOT DETECTED
RVP - HUMAN METAPNEUMOVIRUS (HMPV): NOT DETECTED
RVP - INFLUENZA A: NOT DETECTED
RVP - INFLUENZA B: NOT DETECTED
RVP - RESPIRATORY SYNCTIAL VIRUS (RSV) A: NOT DETECTED
RVP - RESPIRATORY VIRAL PANEL, SOURCE: NORMAL
RVP - RHINOVIRUS: NOT DETECTED
SODIUM SERPL-SCNC: 139 MMOL/L (ref 136–145)
WBC # BLD AUTO: 13.47 K/UL (ref 3.9–12.7)

## 2020-03-09 PROCEDURE — 87076 CULTURE ANAEROBE IDENT EACH: CPT

## 2020-03-09 PROCEDURE — 87070 CULTURE OTHR SPECIMN AEROBIC: CPT

## 2020-03-09 PROCEDURE — 99232 SBSQ HOSP IP/OBS MODERATE 35: CPT | Mod: ,,, | Performed by: INTERNAL MEDICINE

## 2020-03-09 PROCEDURE — 36415 COLL VENOUS BLD VENIPUNCTURE: CPT

## 2020-03-09 PROCEDURE — 99232 PR SUBSEQUENT HOSPITAL CARE,LEVL II: ICD-10-PCS | Mod: ,,, | Performed by: INTERNAL MEDICINE

## 2020-03-09 PROCEDURE — 87075 CULTR BACTERIA EXCEPT BLOOD: CPT

## 2020-03-09 PROCEDURE — 99233 PR SUBSEQUENT HOSPITAL CARE,LEVL III: ICD-10-PCS | Mod: ,,, | Performed by: INTERNAL MEDICINE

## 2020-03-09 PROCEDURE — 11000001 HC ACUTE MED/SURG PRIVATE ROOM

## 2020-03-09 PROCEDURE — 25000003 PHARM REV CODE 250: Performed by: PHYSICIAN ASSISTANT

## 2020-03-09 PROCEDURE — 80048 BASIC METABOLIC PNL TOTAL CA: CPT

## 2020-03-09 PROCEDURE — 85025 COMPLETE CBC W/AUTO DIFF WBC: CPT

## 2020-03-09 PROCEDURE — 25000003 PHARM REV CODE 250: Performed by: INTERNAL MEDICINE

## 2020-03-09 PROCEDURE — 63600175 PHARM REV CODE 636 W HCPCS: Performed by: INTERNAL MEDICINE

## 2020-03-09 PROCEDURE — 94761 N-INVAS EAR/PLS OXIMETRY MLT: CPT

## 2020-03-09 PROCEDURE — 99233 SBSQ HOSP IP/OBS HIGH 50: CPT | Mod: ,,, | Performed by: INTERNAL MEDICINE

## 2020-03-09 RX ADMIN — ENOXAPARIN SODIUM 40 MG: 100 INJECTION SUBCUTANEOUS at 05:03

## 2020-03-09 RX ADMIN — FERROUS SULFATE TAB EC 325 MG (65 MG FE EQUIVALENT) 325 MG: 325 (65 FE) TABLET DELAYED RESPONSE at 08:03

## 2020-03-09 RX ADMIN — AMPICILLIN SODIUM 2 G: 2 INJECTION, POWDER, FOR SOLUTION INTRAVENOUS at 02:03

## 2020-03-09 RX ADMIN — AMPICILLIN SODIUM 2 G: 2 INJECTION, POWDER, FOR SOLUTION INTRAVENOUS at 10:03

## 2020-03-09 RX ADMIN — CEFTRIAXONE 2 G: 2 INJECTION, SOLUTION INTRAVENOUS at 08:03

## 2020-03-09 RX ADMIN — Medication 250 MG: at 08:03

## 2020-03-09 RX ADMIN — ACETAMINOPHEN 650 MG: 325 TABLET ORAL at 12:03

## 2020-03-09 RX ADMIN — CEFTRIAXONE 2 G: 2 INJECTION, SOLUTION INTRAVENOUS at 09:03

## 2020-03-09 RX ADMIN — AMPICILLIN SODIUM 2 G: 2 INJECTION, POWDER, FOR SOLUTION INTRAVENOUS at 06:03

## 2020-03-09 RX ADMIN — ACETAMINOPHEN 650 MG: 325 TABLET ORAL at 08:03

## 2020-03-09 RX ADMIN — ACETAMINOPHEN 650 MG: 325 TABLET ORAL at 05:03

## 2020-03-09 NOTE — PROGRESS NOTES
[facility] Hospital Medicine  Progress Note    Patient Name: Melinda Parekh  MRN: [id number]  Patient Class: IP- Inpatient   Admission Date: 3/3/2020  Length of Stay: 6 days  Attending Physician: EMY Saenz MD  Primary Care Provider: Maicol Jensen MD    Subjective:     Principal Problem:Meningitis    HPI:  Ms. Parekh is a 29/F with PMH significant for recent vaginal delivery 01/03/20 with episiotomy, subsequent infection with prevotella spp. s/p treatment with amoxicillin-clavulanate and metronidazole who presented to ED 03/03 with a reported 9 day history of illness. She reports symptoms began with diffuse body aches, fever, chills, and fatigue. Tmax at home has been up to 102F. Subsequently developed diarrhea with two episodes daily. As symptoms persisted she visited her PCP 02/26; rapid flu and strep were negative and she was presumed to have gastroenteritis and was treated symptomatically. With progressive body aches presented to ED 02/27 and had nausea/vomiting in addition. Evaluation at that time was notable for no significant leukocytosis, equivocal UA (urine culture no growth), no CK elevation, tachycardia that resolved with fluids and she was subsequently discharged home; empiric antibiotic therapy was held at that time. Upon return home her symptoms continued to worsen. She then developed dizziness, visual disturbance and headache; denied neck pain or stiffness. She subsequently presented to ED 03/03 for further evaluation. ED workup was notable for leukocytosis, Tmax 100.4F, mild transaminitis. Hospital medicine was contacted for admission.    Overview/Hospital Course:  Admitted and CSF studies most concerning for bacterial meningitis. 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 CSF culture no growth and blood cultures negative. Had repeat fevers on 03/06; discussed with ID, suspect likely viral given broad spectrum antibiotic use, but will  continue empiric treatment for bacterial for a total 10 day course. Had some abdominal pain and mild transaminitis on presentation. U/S with hepatosplenomegaly. Discussed with GI and will follow-up in clinic.    Interval History: No acute events overnight. Discussed extensively with patient, mother and sister.     Review of Systems   Constitutional: Positive for chills and fever.   Respiratory: Negative for cough and shortness of breath.    Cardiovascular: Negative for chest pain and palpitations.   Gastrointestinal: Negative for abdominal pain, nausea and vomiting.     Objective:     Vital Signs (Most Recent):  Temp: 98.3 °F (36.8 °C) (03/09/20 1606)  Pulse: (!) 114 (03/09/20 1606)  Resp: 18 (03/09/20 1606)  BP: 129/82 (03/09/20 1606)  SpO2: 100 % (03/09/20 1606) Vital Signs (24h Range):  Temp:  [98 °F (36.7 °C)-101.2 °F (38.4 °C)] 98.3 °F (36.8 °C)  Pulse:  [] 114  Resp:  [16-20] 18  SpO2:  [95 %-100 %] 100 %  BP: (113-139)/(63-82) 129/82     Weight: 57.2 kg (126 lb)  Body mass index is 22.32 kg/m².    Intake/Output Summary (Last 24 hours) at 3/9/2020 1632  Last data filed at 3/9/2020 1500  Gross per 24 hour   Intake 1260 ml   Output --   Net 1260 ml      Physical Exam   Constitutional: She is oriented to person, place, and time. She appears well-developed. No distress.   HENT:   Head: Normocephalic and atraumatic.   Eyes: Conjunctivae are normal. Right eye exhibits no discharge. Left eye exhibits no discharge.   Cardiovascular: Normal rate and intact distal pulses.   Pulmonary/Chest: Effort normal. No respiratory distress.   Abdominal: Soft. Bowel sounds are normal. She exhibits no distension. There is no tenderness.   Musculoskeletal: Normal range of motion. She exhibits no edema.   Neurological: She is alert and oriented to person, place, and time.   Skin: Skin is warm and dry.   Nursing note and vitals reviewed.    Significant Labs:   CBC:  Recent Labs   Lab 03/07/20  0503 03/08/20  0612 03/09/20  0455    WBC 14.39* 12.70 13.47*   HGB 8.4* 8.1* 7.9*   HCT 25.0* 24.7* 24.0*    272 288   GRAN 89.0*  12.8* 87.8*  11.2* 88.7*  11.9*   LYMPH 6.7*  1.0 7.2*  0.9* 6.7*  0.9*   MONO 2.4*  0.4 2.1*  0.3 1.9*  0.3   EOS 0.1 0.1 0.1   BASO 0.03 0.03 0.02      CMP:  Recent Labs   Lab 03/07/20  0504 03/08/20  0612 03/09/20  0455    139 139   K 3.3* 3.6 3.9    104 105   CO2 27 24 26   BUN 6 6 6   CREATININE 0.7 0.6 0.6   GLU 92 94 97   CALCIUM 8.0* 7.9* 8.1*   MG 1.7 2.1  --    PHOS 3.8 4.2  --    ALKPHOS 94 77  --    AST 23 24  --    ALT 24 24  --    BILITOT 0.3 0.2  --    PROT 5.4* 5.3*  --    ALBUMIN 1.8* 1.7*  --    ANIONGAP 9 11 8      Significant Imaging:   No new imaging this morning.      Assessment/Plan:      * Meningitis  - Meningitis with LP 03/03 demonstrating significant elevation in WBCs, low glucose, and elevated protein.  - Continuing broad coverage with ampicillin 2g IV q4hr, ceftriaxone 2g IV q12hr.  - Labs more consistent with bacterial though given duration, feel that viral may be more likely. Will treat empirically for bacterial for a 10 day total course.  - CSF culture no growth to date. Blood cultures negative. Repeat cultures in process, no growth to date.  - Patient/family remain significantly concerned about her persistent fevers. Difficult to reassure. Her presentation does remain most consistent with viral etiology including continued fever and chills; she complains of more R-sided headache than not and her sister is insistent that further neuroimaging be performed.  - ID consulted; appreciate assistance.    Iron deficiency anemia  - Hgb mildly low since 10/2019. No evidence of acute bleed.  - Continuing ferrous sulfate 325mg PO every other day, ascorbic acid 250mg PO every other day.    Transaminitis  - Mild; unclear etiology. Improved with IVFs. Some reported abdominal discomfort.  - U/S Abd with mild hepatomegaly; acute hepatitis panel negative.  - EBV pending.  - Will  follow-up with GI in the outpatient setting.    Leukocytosis  - As under meningitis.    VTE Risk Mitigation (From admission, onward)         Ordered     enoxaparin injection 40 mg  Daily      03/03/20 1645     IP VTE HIGH RISK PATIENT  Once      03/03/20 1645     Place sequential compression device  Until discontinued      03/03/20 1645              FAUSTO Saenz MD  Department of Hospital Medicine   [facility]

## 2020-03-09 NOTE — SUBJECTIVE & OBJECTIVE
Interval History: No acute events overnight. Discussed extensively with patient, mother and sister.     Review of Systems   Constitutional: Positive for chills and fever.   Respiratory: Negative for cough and shortness of breath.    Cardiovascular: Negative for chest pain and palpitations.   Gastrointestinal: Negative for abdominal pain, nausea and vomiting.     Objective:     Vital Signs (Most Recent):  Temp: 98.3 °F (36.8 °C) (03/09/20 1606)  Pulse: (!) 114 (03/09/20 1606)  Resp: 18 (03/09/20 1606)  BP: 129/82 (03/09/20 1606)  SpO2: 100 % (03/09/20 1606) Vital Signs (24h Range):  Temp:  [98 °F (36.7 °C)-101.2 °F (38.4 °C)] 98.3 °F (36.8 °C)  Pulse:  [] 114  Resp:  [16-20] 18  SpO2:  [95 %-100 %] 100 %  BP: (113-139)/(63-82) 129/82     Weight: 57.2 kg (126 lb)  Body mass index is 22.32 kg/m².    Intake/Output Summary (Last 24 hours) at 3/9/2020 1632  Last data filed at 3/9/2020 1500  Gross per 24 hour   Intake 1260 ml   Output --   Net 1260 ml      Physical Exam   Constitutional: She is oriented to person, place, and time. She appears well-developed. No distress.   HENT:   Head: Normocephalic and atraumatic.   Eyes: Conjunctivae are normal. Right eye exhibits no discharge. Left eye exhibits no discharge.   Cardiovascular: Normal rate and intact distal pulses.   Pulmonary/Chest: Effort normal. No respiratory distress.   Abdominal: Soft. Bowel sounds are normal. She exhibits no distension. There is no tenderness.   Musculoskeletal: Normal range of motion. She exhibits no edema.   Neurological: She is alert and oriented to person, place, and time.   Skin: Skin is warm and dry.   Nursing note and vitals reviewed.    Significant Labs:   CBC:  Recent Labs   Lab 03/07/20  0503 03/08/20  0612 03/09/20  0455   WBC 14.39* 12.70 13.47*   HGB 8.4* 8.1* 7.9*   HCT 25.0* 24.7* 24.0*    272 288   GRAN 89.0*  12.8* 87.8*  11.2* 88.7*  11.9*   LYMPH 6.7*  1.0 7.2*  0.9* 6.7*  0.9*   MONO 2.4*  0.4 2.1*  0.3  1.9*  0.3   EOS 0.1 0.1 0.1   BASO 0.03 0.03 0.02      CMP:  Recent Labs   Lab 03/07/20  0504 03/08/20  0612 03/09/20  0455    139 139   K 3.3* 3.6 3.9    104 105   CO2 27 24 26   BUN 6 6 6   CREATININE 0.7 0.6 0.6   GLU 92 94 97   CALCIUM 8.0* 7.9* 8.1*   MG 1.7 2.1  --    PHOS 3.8 4.2  --    ALKPHOS 94 77  --    AST 23 24  --    ALT 24 24  --    BILITOT 0.3 0.2  --    PROT 5.4* 5.3*  --    ALBUMIN 1.8* 1.7*  --    ANIONGAP 9 11 8      Significant Imaging:   No new imaging this morning.

## 2020-03-09 NOTE — PLAN OF CARE
Pt AAOx4. Afebrile and VSS. Poc reviewed with pt and questions answered. Pt is eating and tolerating diet well. Voiding accurately up to toilet. Drainage clear and yellow. Pt temp 101.1 this shift. Tylenol given. Remains free from falls, injury, and skin breakdown. All needs and concerns met. Purposeful rounding done. Bed is locked and in lowest position, side rails up x2, call light in reach. Pt denies pain or discomfort and is resting comfortably at present. Will continue to monitor.

## 2020-03-09 NOTE — ASSESSMENT & PLAN NOTE
- Meningitis with LP 03/03 demonstrating significant elevation in WBCs, low glucose, and elevated protein.  - Continuing broad coverage with ampicillin 2g IV q4hr, ceftriaxone 2g IV q12hr.  - Labs more consistent with bacterial though given duration, feel that viral may be more likely. Will treat empirically for bacterial for a 10 day total course.  - CSF culture no growth to date. Blood cultures negative. Repeat cultures in process, no growth to date.  - Patient/family remain significantly concerned about her persistent fevers. Difficult to reassure. Her presentation does remain most consistent with viral etiology including continued fever and chills; she complains of more R-sided headache than not and her sister is insistent that further neuroimaging be performed.  - ID consulted; appreciate assistance.

## 2020-03-09 NOTE — CONSULTS
GYN consult    Called to see patient and evaluate for GYN source of infection.   Patient well known to me. I came by for social rounds multiple times last week.  Now 10 weeks postpartum from [facility] with episiotomy.  About 2 weeks postpartum had superficial infection of episiotomy site that was treated with antibiotics  She was seen at her 6 weeks postpartum visit and the area was well healed  She presented 9 weeks postpartum to ER twice with fever, chills, body aches, headache  CSF c/w meningitis, bacterial vs viral  Clinically not improving despite IV antibiotics for days    GYN exam- vulva-intact, well healed, no sign of infection  Bimanual exam- uterus small mobile, non tender  Adnexa- non tender no palpable masses    Aerobic, anaerobic and affirm cultures done of perineum    Will likely grow some bacteria from perineum even though there is no infection. Patient is aware.    All questions answered.

## 2020-03-10 LAB
ANION GAP SERPL CALC-SCNC: 6 MMOL/L (ref 8–16)
BASOPHILS # BLD AUTO: 0.02 K/UL (ref 0–0.2)
BASOPHILS NFR BLD: 0.1 % (ref 0–1.9)
BUN SERPL-MCNC: 8 MG/DL (ref 6–20)
CALCIUM SERPL-MCNC: 7.9 MG/DL (ref 8.7–10.5)
CANDIDA RRNA VAG QL PROBE: NEGATIVE
CHLORIDE SERPL-SCNC: 106 MMOL/L (ref 95–110)
CO2 SERPL-SCNC: 27 MMOL/L (ref 23–29)
CREAT SERPL-MCNC: 0.6 MG/DL (ref 0.5–1.4)
DIFFERENTIAL METHOD: ABNORMAL
EBV EA IGG SER-ACNC: <5 U/ML
EBV NA IGG SER-ACNC: <3 U/ML
EBV VCA IGG SER-ACNC: <10 U/ML
EBV VCA IGM SER-ACNC: <10 U/ML
EOSINOPHIL # BLD AUTO: 0.1 K/UL (ref 0–0.5)
EOSINOPHIL NFR BLD: 0.6 % (ref 0–8)
ERYTHROCYTE [DISTWIDTH] IN BLOOD BY AUTOMATED COUNT: 12 % (ref 11.5–14.5)
EST. GFR  (AFRICAN AMERICAN): >60 ML/MIN/1.73 M^2
EST. GFR  (NON AFRICAN AMERICAN): >60 ML/MIN/1.73 M^2
G VAGINALIS RRNA GENITAL QL PROBE: NEGATIVE
GLUCOSE SERPL-MCNC: 93 MG/DL (ref 70–110)
HCT VFR BLD AUTO: 24.7 % (ref 37–48.5)
HGB BLD-MCNC: 8.1 G/DL (ref 12–16)
IMM GRANULOCYTES # BLD AUTO: 0.7 K/UL (ref 0–0.04)
IMM GRANULOCYTES NFR BLD AUTO: 4.5 % (ref 0–0.5)
LYMPHOCYTES # BLD AUTO: 1.2 K/UL (ref 1–4.8)
LYMPHOCYTES NFR BLD: 7.9 % (ref 18–48)
MCH RBC QN AUTO: 28.1 PG (ref 27–31)
MCHC RBC AUTO-ENTMCNC: 32.8 G/DL (ref 32–36)
MCV RBC AUTO: 86 FL (ref 82–98)
MONOCYTES # BLD AUTO: 0.4 K/UL (ref 0.3–1)
MONOCYTES NFR BLD: 2.2 % (ref 4–15)
NEUTROPHILS # BLD AUTO: 13.3 K/UL (ref 1.8–7.7)
NEUTROPHILS NFR BLD: 84.7 % (ref 38–73)
NRBC BLD-RTO: 0 /100 WBC
PLATELET # BLD AUTO: 371 K/UL (ref 150–350)
PMV BLD AUTO: 9.4 FL (ref 9.2–12.9)
POTASSIUM SERPL-SCNC: 3.4 MMOL/L (ref 3.5–5.1)
RBC # BLD AUTO: 2.88 M/UL (ref 4–5.4)
SODIUM SERPL-SCNC: 139 MMOL/L (ref 136–145)
T VAGINALIS RRNA GENITAL QL PROBE: NEGATIVE
WBC # BLD AUTO: 15.68 K/UL (ref 3.9–12.7)

## 2020-03-10 PROCEDURE — 85025 COMPLETE CBC W/AUTO DIFF WBC: CPT

## 2020-03-10 PROCEDURE — 80048 BASIC METABOLIC PNL TOTAL CA: CPT

## 2020-03-10 PROCEDURE — 99233 PR SUBSEQUENT HOSPITAL CARE,LEVL III: ICD-10-PCS | Mod: ,,, | Performed by: INTERNAL MEDICINE

## 2020-03-10 PROCEDURE — 11000001 HC ACUTE MED/SURG PRIVATE ROOM

## 2020-03-10 PROCEDURE — 63600175 PHARM REV CODE 636 W HCPCS: Performed by: INTERNAL MEDICINE

## 2020-03-10 PROCEDURE — 94761 N-INVAS EAR/PLS OXIMETRY MLT: CPT

## 2020-03-10 PROCEDURE — A9585 GADOBUTROL INJECTION: HCPCS | Performed by: INTERNAL MEDICINE

## 2020-03-10 PROCEDURE — 94799 UNLISTED PULMONARY SVC/PX: CPT

## 2020-03-10 PROCEDURE — 25500020 PHARM REV CODE 255: Performed by: INTERNAL MEDICINE

## 2020-03-10 PROCEDURE — 25000003 PHARM REV CODE 250: Performed by: PHYSICIAN ASSISTANT

## 2020-03-10 PROCEDURE — 99233 SBSQ HOSP IP/OBS HIGH 50: CPT | Mod: ,,, | Performed by: INTERNAL MEDICINE

## 2020-03-10 PROCEDURE — 36415 COLL VENOUS BLD VENIPUNCTURE: CPT

## 2020-03-10 RX ORDER — GADOBUTROL 604.72 MG/ML
5.5 INJECTION INTRAVENOUS
Status: COMPLETED | OUTPATIENT
Start: 2020-03-10 | End: 2020-03-10

## 2020-03-10 RX ADMIN — CEFTRIAXONE 2 G: 2 INJECTION, SOLUTION INTRAVENOUS at 09:03

## 2020-03-10 RX ADMIN — GADOBUTROL 5.5 ML: 604.72 INJECTION INTRAVENOUS at 03:03

## 2020-03-10 RX ADMIN — ACETAMINOPHEN 650 MG: 325 TABLET ORAL at 12:03

## 2020-03-10 RX ADMIN — CEFTRIAXONE 2 G: 2 INJECTION, SOLUTION INTRAVENOUS at 08:03

## 2020-03-10 RX ADMIN — ACETAMINOPHEN 650 MG: 325 TABLET ORAL at 06:03

## 2020-03-10 RX ADMIN — ENOXAPARIN SODIUM 40 MG: 100 INJECTION SUBCUTANEOUS at 05:03

## 2020-03-10 NOTE — SUBJECTIVE & OBJECTIVE
Interval History:  Pt seen and examined at bedside. She reports mild R sided head discomfort. No fever recorded overnight but she still experienced chills and sweating. No acute events overnight. Discussed extensively with patient and her sister over the phone.     Review of Systems   Constitutional: Positive for chills. Negative for fever.   Respiratory: Negative for cough and shortness of breath.    Cardiovascular: Negative for chest pain and palpitations.   Gastrointestinal: Negative for abdominal pain, nausea and vomiting.     Objective:     Vital Signs (Most Recent):  Temp: 98 °F (36.7 °C) (03/10/20 1206)  Pulse: 95 (03/10/20 1206)  Resp: 18 (03/10/20 1206)  BP: 123/73 (03/10/20 1206)  SpO2: 97 % (03/10/20 1206) Vital Signs (24h Range):  Temp:  [98 °F (36.7 °C)-99.9 °F (37.7 °C)] 98 °F (36.7 °C)  Pulse:  [] 95  Resp:  [18] 18  SpO2:  [96 %-100 %] 97 %  BP: (122-144)/(71-82) 123/73     Weight: 57.2 kg (126 lb)  Body mass index is 22.32 kg/m².    Intake/Output Summary (Last 24 hours) at 3/10/2020 1548  Last data filed at 3/10/2020 0845  Gross per 24 hour   Intake 240 ml   Output --   Net 240 ml      Physical Exam   Constitutional: She is oriented to person, place, and time. She appears well-developed and well-nourished. No distress.   HENT:   Head: Normocephalic.   Cardiovascular: Normal rate, regular rhythm and intact distal pulses.   Pulmonary/Chest: Effort normal and breath sounds normal. No respiratory distress.   Abdominal: Soft. Bowel sounds are normal. She exhibits no distension. There is no tenderness.   Musculoskeletal: Normal range of motion. She exhibits no edema.   Neurological: She is alert and oriented to person, place, and time.   Skin: Skin is warm and dry.   Psychiatric: She has a normal mood and affect.   Nursing note and vitals reviewed.    Significant Labs:   CBC:  Recent Labs   Lab 03/08/20  0612 03/09/20  0455 03/10/20  0557   WBC 12.70 13.47* 15.68*   HGB 8.1* 7.9* 8.1*   HCT 24.7*  24.0* 24.7*    288 371*   GRAN 87.8*  11.2* 88.7*  11.9* 84.7*  13.3*   LYMPH 7.2*  0.9* 6.7*  0.9* 7.9*  1.2   MONO 2.1*  0.3 1.9*  0.3 2.2*  0.4   EOS 0.1 0.1 0.1   BASO 0.03 0.02 0.02      CMP:  Recent Labs   Lab 03/08/20  0612 03/09/20  0455 03/10/20  0557    139 139   K 3.6 3.9 3.4*    105 106   CO2 24 26 27   BUN 6 6 8   CREATININE 0.6 0.6 0.6   GLU 94 97 93   CALCIUM 7.9* 8.1* 7.9*   MG 2.1  --   --    PHOS 4.2  --   --    ALKPHOS 77  --   --    AST 24  --   --    ALT 24  --   --    BILITOT 0.2  --   --    PROT 5.3*  --   --    ALBUMIN 1.7*  --   --    ANIONGAP 11 8 6*      Significant Imaging:   MRI brain: normal

## 2020-03-10 NOTE — ASSESSMENT & PLAN NOTE
- Meningitis with LP 03/03 demonstrating significant elevation in WBCs, low glucose, and elevated protein but cultures negative  - Appreciate ID input. Ampicillin stopped. Continue Ceftriaxone 2 gm q12 hr until 3/14/20 to complete 10 day course per ID  - Labs more consistent with bacterial though given duration, feel that viral may be more likely  - CSF culture no growth to date. Blood cultures negative. Repeat cultures in process, no growth to date.  - Further viral studies including WNV, EBV also negative  - MRI brain unremarkable  - ? Fever due to rheumatologic condition, although no other symptoms. Will send rheum screening studies  - Continued reassurance provided

## 2020-03-10 NOTE — PLAN OF CARE
Pt AAOx4, VSS on RA and afebrile - T max 99.0 during shift. Pt remains free from falls, injury, and skin breakdown. Tolerating ordered diet. Voiding clear yellow urine spontaneously without difficulty. Pt received scheduled IV antibiotic during shift. Ambulating without difficulty. POC reviewed with pt and all questions answered. Pt has call light in reach,bed brakes on, side rails up x2, bed in low position, IS at bedside, and nonskid socks on. Purposeful hourly rounding and safety maintained. Cont to monitor.

## 2020-03-10 NOTE — SUBJECTIVE & OBJECTIVE
Interval History:  Patient continues to have fevers  Reports today is first day without chills  Notes headaches is more right sided at this time  Continues to have abdominal distention, reports solid BM everytime she uses the restroom, no diarrhea  Intermittent dry cough, improving  Overall feels better compared to admission    Review of Systems   Constitutional: Positive for fever. Negative for diaphoresis.   HENT: Negative for rhinorrhea and sore throat.    Respiratory: Positive for cough. Negative for shortness of breath.    Cardiovascular: Negative for chest pain, palpitations and leg swelling.   Gastrointestinal: Positive for abdominal distention and abdominal pain. Negative for diarrhea, nausea and vomiting.   Genitourinary: Negative for dysuria and hematuria.   Musculoskeletal: Positive for arthralgias and myalgias.   Skin: Negative for rash.   Neurological: Positive for headaches.     Objective:     Vital Signs (Most Recent):  Temp: 98.3 °F (36.8 °C) (03/09/20 1606)  Pulse: (!) 114 (03/09/20 1606)  Resp: 18 (03/09/20 1606)  BP: 129/82 (03/09/20 1606)  SpO2: 100 % (03/09/20 1606) Vital Signs (24h Range):  Temp:  [98 °F (36.7 °C)-101.2 °F (38.4 °C)] 98.3 °F (36.8 °C)  Pulse:  [107-123] 114  Resp:  [16-18] 18  SpO2:  [95 %-100 %] 100 %  BP: (113-139)/(63-82) 129/82     Weight: 57.2 kg (126 lb)  Body mass index is 22.32 kg/m².      Physical Exam   Constitutional: She is oriented to person, place, and time. She appears well-developed and well-nourished. No distress.   HENT:   Head: Normocephalic and atraumatic.   Eyes: Conjunctivae and EOM are normal.   Neck: Normal range of motion. Neck supple.   Pulmonary/Chest: Effort normal. No respiratory distress.   Abdominal: Soft. Bowel sounds are normal. She exhibits distension. There is no tenderness. There is no guarding.   Musculoskeletal: Normal range of motion. She exhibits no edema.   Neurological: She is alert and oriented to person, place, and time.   Skin: Skin  is warm and dry. No rash noted. She is not diaphoretic. No erythema.   Psychiatric: She has a normal mood and affect. Her behavior is normal.   Vitals reviewed.    Significant Labs:   All labs within the last 24 hours reviewed    Significant Imaging:   All imaging within the last 24 hours reviewed

## 2020-03-10 NOTE — NURSING
"Spoke with Radiology dept regarding outstanding MRI Brain W WO Contrast order - Radiology tech stated, "We are troubleshooting machine, we will let you know when we can send for her". Will continue to monitor status.   "

## 2020-03-10 NOTE — ASSESSMENT & PLAN NOTE
- Resolved  - Mild; unclear etiology but suspect related to viral illness  - U/S Abd with mild hepatomegaly; acute hepatitis panel negative.  - EBV negative.  - Will follow-up with GI in the outpatient setting.

## 2020-03-10 NOTE — PROGRESS NOTES
[facility]  Infectious Disease  Progress Note    Patient Name: Melinda Parekh  MRN: [id number]  Admission Date: 3/3/2020  Length of Stay: 6 days  Attending Physician: EMY Saenz MD  Primary Care Provider: Maicol Jensen MD    Isolation Status: No active isolations  Assessment/Plan:      * Meningitis  29F with history of vaginal delivery on 1/03/20 requiring episiotomy and repair presents with a week of fevers, chills, myalgias, arthalgias, nausea, vomiting, blanching rash, followed by acute onset of headache, neck stiffness, and blurry vision.      Patient reports onset of illness started after going to [employer].  Denied any known sick contacts, no recent history of eating unpasturized cheese or deli meat.  Lives near [facility] with possible mosquito bite.  Two outdoor dogs at home, no recent travel, no outdoor hobbies.    CSF studies notable for  (68% segs, 4% lymph), elevated protein 174, low glucose 39. HSV PCR negative.  WNV pending.  No additional CSF to perform arbovirus panel.      Patient now with ongoing fevers - more suggestive of ongoing viral process given broad empiric antibiotic coverage.  Resp infection panel negative.      Recommendations:  - Discontinue ampicillin IV given low suspicion for Listeria meningitis - assess any worsening in clinical status off ampicillin  - Continue ceftriaxone 2g IV q12 hours   - EBV antibody panel pending  - If afebrile for 48 hours, okay to place midline, plan for total 10 day course (last day 3/14/2020)          Thank you for your consult. I will follow-up with patient. Please contact us if you have any additional questions.    Irma Lopez MD  Infectious Disease  [facility]    Subjective:     Principal Problem:Meningitis    HPI: 29F with h/o vaginal delivery on 1/03/20 c/b episiotomy admitted overnight with 9 days of fever tmax 102, bodyaches/shaking chills. Says she ultimately came to hospital because of severe  headache and sensation of dizziness and blurry vision. Also reports loose stools and abdominal pain during this time period. Over past 3d reports dry cough and feeling short winded. Says she noticed fine rash on bilatearl thighs and abdomen when she took shower prior to arrival. Reports having taken 2 rounds of antibiotics recently for what she says was a skin infection from her episiotomy (thinks she was given clindamycin; also note in chart for metronidazole and augmentin), but denies recent abx use and says surgical scar now healed. Denies sick contacts.         Interval History:  Patient continues to have fevers  Reports today is first day without chills  Notes headaches is more right sided at this time  Continues to have abdominal distention, reports solid BM everytime she uses the restroom, no diarrhea  Intermittent dry cough, improving  Overall feels better compared to admission    Review of Systems   Constitutional: Positive for fever. Negative for diaphoresis.   HENT: Negative for rhinorrhea and sore throat.    Respiratory: Positive for cough. Negative for shortness of breath.    Cardiovascular: Negative for chest pain, palpitations and leg swelling.   Gastrointestinal: Positive for abdominal distention and abdominal pain. Negative for diarrhea, nausea and vomiting.   Genitourinary: Negative for dysuria and hematuria.   Musculoskeletal: Positive for arthralgias and myalgias.   Skin: Negative for rash.   Neurological: Positive for headaches.     Objective:     Vital Signs (Most Recent):  Temp: 98.3 °F (36.8 °C) (03/09/20 1606)  Pulse: (!) 114 (03/09/20 1606)  Resp: 18 (03/09/20 1606)  BP: 129/82 (03/09/20 1606)  SpO2: 100 % (03/09/20 1606) Vital Signs (24h Range):  Temp:  [98 °F (36.7 °C)-101.2 °F (38.4 °C)] 98.3 °F (36.8 °C)  Pulse:  [107-123] 114  Resp:  [16-18] 18  SpO2:  [95 %-100 %] 100 %  BP: (113-139)/(63-82) 129/82     Weight: 57.2 kg (126 lb)  Body mass index is 22.32 kg/m².      Physical Exam    Constitutional: She is oriented to person, place, and time. She appears well-developed and well-nourished. No distress.   HENT:   Head: Normocephalic and atraumatic.   Eyes: Conjunctivae and EOM are normal.   Neck: Normal range of motion. Neck supple.   Pulmonary/Chest: Effort normal. No respiratory distress.   Abdominal: Soft. Bowel sounds are normal. She exhibits distension. There is no tenderness. There is no guarding.   Musculoskeletal: Normal range of motion. She exhibits no edema.   Neurological: She is alert and oriented to person, place, and time.   Skin: Skin is warm and dry. No rash noted. She is not diaphoretic. No erythema.   Psychiatric: She has a normal mood and affect. Her behavior is normal.   Vitals reviewed.    Significant Labs:   All labs within the last 24 hours reviewed    Significant Imaging:   All imaging within the last 24 hours reviewed

## 2020-03-10 NOTE — PROGRESS NOTES
[facility] Hospital Medicine  Progress Note    Patient Name: Melinda Parekh  MRN: [id number]  Patient Class: IP- Inpatient   Admission Date: 3/3/2020  Length of Stay: 7 days  Attending Physician: Caprice López MD  Primary Care Provider: Maicol Jensen MD        Subjective:     Principal Problem:Meningitis        HPI:  Ms. Parekh is a 29/F with PMH significant for recent vaginal delivery 01/03/20 with episiotomy, subsequent infection with prevotella spp. s/p treatment with amoxicillin-clavulanate and metronidazole who presented to ED 03/03 with a reported 9 day history of illness. She reports symptoms began with diffuse body aches, fever, chills, and fatigue. Tmax at home has been up to 102F. Subsequently developed diarrhea with two episodes daily. As symptoms persisted she visited her PCP 02/26; rapid flu and strep were negative and she was presumed to have gastroenteritis and was treated symptomatically. With progressive body aches presented to ED 02/27 and had nausea/vomiting in addition. Evaluation at that time was notable for no significant leukocytosis, equivocal UA (urine culture no growth), no CK elevation, tachycardia that resolved with fluids and she was subsequently discharged home; empiric antibiotic therapy was held at that time. Upon return home her symptoms continued to worsen. She then developed dizziness, visual disturbance and headache; denied neck pain or stiffness. She subsequently presented to ED 03/03 for further evaluation. ED workup was notable for leukocytosis, Tmax 100.4F, mild transaminitis. Hospital medicine was contacted for admission.    Overview/Hospital Course:  Admitted and CSF studies most concerning for bacterial meningitis. 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 CSF culture no growth and blood cultures negative. Had repeat fevers on 03/06; discussed with ID, suspect likely viral given broad spectrum antibiotic use, but  will continue empiric treatment for bacterial for a total 10 day course. Had some abdominal pain and mild transaminitis on presentation. U/S with hepatosplenomegaly. Discussed with GI and will follow-up in clinic.    Interval History:  Pt seen and examined at bedside. She reports mild R sided head discomfort. No fever recorded overnight but she still experienced chills and sweating. No acute events overnight. Discussed extensively with patient and her sister over the phone.     Review of Systems   Constitutional: Positive for chills. Negative for fever.   Respiratory: Negative for cough and shortness of breath.    Cardiovascular: Negative for chest pain and palpitations.   Gastrointestinal: Negative for abdominal pain, nausea and vomiting.     Objective:     Vital Signs (Most Recent):  Temp: 98 °F (36.7 °C) (03/10/20 1206)  Pulse: 95 (03/10/20 1206)  Resp: 18 (03/10/20 1206)  BP: 123/73 (03/10/20 1206)  SpO2: 97 % (03/10/20 1206) Vital Signs (24h Range):  Temp:  [98 °F (36.7 °C)-99.9 °F (37.7 °C)] 98 °F (36.7 °C)  Pulse:  [] 95  Resp:  [18] 18  SpO2:  [96 %-100 %] 97 %  BP: (122-144)/(71-82) 123/73     Weight: 57.2 kg (126 lb)  Body mass index is 22.32 kg/m².    Intake/Output Summary (Last 24 hours) at 3/10/2020 1548  Last data filed at 3/10/2020 0845  Gross per 24 hour   Intake 240 ml   Output --   Net 240 ml      Physical Exam   Constitutional: She is oriented to person, place, and time. She appears well-developed and well-nourished. No distress.   HENT:   Head: Normocephalic.   Cardiovascular: Normal rate, regular rhythm and intact distal pulses.   Pulmonary/Chest: Effort normal and breath sounds normal. No respiratory distress.   Abdominal: Soft. Bowel sounds are normal. She exhibits no distension. There is no tenderness.   Musculoskeletal: Normal range of motion. She exhibits no edema.   Neurological: She is alert and oriented to person, place, and time.   Skin: Skin is warm and dry.   Psychiatric: She  has a normal mood and affect.   Nursing note and vitals reviewed.    Significant Labs:   CBC:  Recent Labs   Lab 03/08/20  0612 03/09/20  0455 03/10/20  0557   WBC 12.70 13.47* 15.68*   HGB 8.1* 7.9* 8.1*   HCT 24.7* 24.0* 24.7*    288 371*   GRAN 87.8*  11.2* 88.7*  11.9* 84.7*  13.3*   LYMPH 7.2*  0.9* 6.7*  0.9* 7.9*  1.2   MONO 2.1*  0.3 1.9*  0.3 2.2*  0.4   EOS 0.1 0.1 0.1   BASO 0.03 0.02 0.02      CMP:  Recent Labs   Lab 03/08/20 0612 03/09/20 0455 03/10/20  0557    139 139   K 3.6 3.9 3.4*    105 106   CO2 24 26 27   BUN 6 6 8   CREATININE 0.6 0.6 0.6   GLU 94 97 93   CALCIUM 7.9* 8.1* 7.9*   MG 2.1  --   --    PHOS 4.2  --   --    ALKPHOS 77  --   --    AST 24  --   --    ALT 24  --   --    BILITOT 0.2  --   --    PROT 5.3*  --   --    ALBUMIN 1.7*  --   --    ANIONGAP 11 8 6*      Significant Imaging:   MRI brain: normal      Assessment/Plan:      * Meningitis  - Meningitis with LP 03/03 demonstrating significant elevation in WBCs, low glucose, and elevated protein but cultures negative  - Appreciate ID input. Ampicillin stopped. Continue Ceftriaxone 2 gm q12 hr until 3/14/20  - Labs more consistent with bacterial though given duration, feel that viral may be more likely  - Will treat empirically for bacterial for a 10 day total course.  - CSF culture no growth to date. Blood cultures negative. Repeat cultures in process, no growth to date.  - Further viral studies including WNV, EBV also negative  - MRI brain unremarkable  - Continued reassurance provided  - Afebrile >24 hrs. Consider discharge on home IV abx once afebrile 48 hrs    Iron deficiency anemia  - Hgb mildly low since 10/2019. No evidence of acute bleed.  - Continuing ferrous sulfate 325mg PO every other day, ascorbic acid 250mg PO every other day.    Transaminitis  - Resolved  - Mild; unclear etiology but suspect related to viral illness  - U/S Abd with mild hepatomegaly; acute hepatitis panel negative.  - EBV  negative.  - Will follow-up with GI in the outpatient setting.    Leukocytosis  - WBC up and down, ranging from 10-15  - Afebrile >24 hrs  - Appreciate Gyn input. No gynecologic source indentified      VTE Risk Mitigation (From admission, onward)         Ordered     enoxaparin injection 40 mg  Daily      03/03/20 1645     IP VTE HIGH RISK PATIENT  Once      03/03/20 1645     Place sequential compression device  Until discontinued      03/03/20 1645                      Caprice López MD  Department of Hospital Medicine   [facility]

## 2020-03-10 NOTE — ASSESSMENT & PLAN NOTE
- WBC up and down, ranging from 10-15  - Afebrile >24 hrs  - Appreciate Gyn input. No gynecologic source indentified

## 2020-03-10 NOTE — ASSESSMENT & PLAN NOTE
- Meningitis with LP 03/03 demonstrating significant elevation in WBCs, low glucose, and elevated protein but cultures negative  - Appreciate ID input. Ampicillin stopped. Continue Ceftriaxone 2 gm q12 hr until 3/14/20  - Labs more consistent with bacterial though given duration, feel that viral may be more likely  - Will treat empirically for bacterial for a 10 day total course.  - CSF culture no growth to date. Blood cultures negative. Repeat cultures in process, no growth to date.  - Further viral studies including WNV, EBV also negative  - Continued reassurance provided  - Afebrile >24 hrs. Consider discharge on home IV abx once afebrile 48 hrs

## 2020-03-10 NOTE — ASSESSMENT & PLAN NOTE
29F with history of vaginal delivery on 1/03/20 requiring episiotomy and repair presents with a week of fevers, chills, myalgias, arthalgias, nausea, vomiting, blanching rash, followed by acute onset of headache, neck stiffness, and blurry vision.      Patient reports onset of illness started after going to [facility].  Denied any known sick contacts, no recent history of eating unpasturized cheese or deli meat.  Lives near [facility] with possible mosquito bite.  Two outdoor dogs at home, no recent travel, no outdoor hobbies.    CSF studies notable for  (68% segs, 4% lymph), elevated protein 174, low glucose 39. HSV PCR negative.  WNV pending.  No additional CSF to perform arbovirus panel.      Patient now with ongoing fevers - more suggestive of ongoing viral process given broad empiric antibiotic coverage.  Resp infection panel negative.      Recommendations:  - Discontinue ampicillin IV given low suspicion for Listeria meningitis - assess any worsening in clinical status off ampicillin  - Continue ceftriaxone 2g IV q12 hours   - EBV antibody panel pending  - If afebrile for 48 hours, okay to place midline, plan for total 10 day course (last day 3/14/2020)

## 2020-03-11 ENCOUNTER — PATIENT MESSAGE (OUTPATIENT)
Dept: OBSTETRICS AND GYNECOLOGY | Facility: CLINIC | Age: 30
End: 2020-03-11

## 2020-03-11 VITALS
HEIGHT: 63 IN | HEART RATE: 115 BPM | BODY MASS INDEX: 22.32 KG/M2 | DIASTOLIC BLOOD PRESSURE: 73 MMHG | TEMPERATURE: 99 F | WEIGHT: 126 LBS | RESPIRATION RATE: 18 BRPM | OXYGEN SATURATION: 99 % | SYSTOLIC BLOOD PRESSURE: 123 MMHG

## 2020-03-11 LAB
ANION GAP SERPL CALC-SCNC: 8 MMOL/L (ref 8–16)
BACTERIA BLD CULT: NORMAL
BASOPHILS # BLD AUTO: 0.03 K/UL (ref 0–0.2)
BASOPHILS NFR BLD: 0.2 % (ref 0–1.9)
BUN SERPL-MCNC: 7 MG/DL (ref 6–20)
CALCIUM SERPL-MCNC: 8.1 MG/DL (ref 8.7–10.5)
CCP AB SER IA-ACNC: <0.5 U/ML
CHLORIDE SERPL-SCNC: 104 MMOL/L (ref 95–110)
CO2 SERPL-SCNC: 25 MMOL/L (ref 23–29)
CREAT SERPL-MCNC: 0.6 MG/DL (ref 0.5–1.4)
CRP SERPL-MCNC: 126.4 MG/L (ref 0–8.2)
DIFFERENTIAL METHOD: ABNORMAL
EOSINOPHIL # BLD AUTO: 0.1 K/UL (ref 0–0.5)
EOSINOPHIL NFR BLD: 0.4 % (ref 0–8)
ERYTHROCYTE [DISTWIDTH] IN BLOOD BY AUTOMATED COUNT: 11.8 % (ref 11.5–14.5)
ERYTHROCYTE [SEDIMENTATION RATE] IN BLOOD: 104 MM/HR (ref 0–20)
EST. GFR  (AFRICAN AMERICAN): >60 ML/MIN/1.73 M^2
EST. GFR  (NON AFRICAN AMERICAN): >60 ML/MIN/1.73 M^2
GLUCOSE SERPL-MCNC: 98 MG/DL (ref 70–110)
HCT VFR BLD AUTO: 24.6 % (ref 37–48.5)
HGB BLD-MCNC: 8.1 G/DL (ref 12–16)
IMM GRANULOCYTES # BLD AUTO: 0.43 K/UL (ref 0–0.04)
IMM GRANULOCYTES NFR BLD AUTO: 2.6 % (ref 0–0.5)
LYMPHOCYTES # BLD AUTO: 1 K/UL (ref 1–4.8)
LYMPHOCYTES NFR BLD: 6.3 % (ref 18–48)
MCH RBC QN AUTO: 27.9 PG (ref 27–31)
MCHC RBC AUTO-ENTMCNC: 32.9 G/DL (ref 32–36)
MCV RBC AUTO: 85 FL (ref 82–98)
MONOCYTES # BLD AUTO: 0.5 K/UL (ref 0.3–1)
MONOCYTES NFR BLD: 3.1 % (ref 4–15)
NEUTROPHILS # BLD AUTO: 14.3 K/UL (ref 1.8–7.7)
NEUTROPHILS NFR BLD: 87.4 % (ref 38–73)
NRBC BLD-RTO: 0 /100 WBC
PLATELET # BLD AUTO: 384 K/UL (ref 150–350)
PMV BLD AUTO: 9.5 FL (ref 9.2–12.9)
POTASSIUM SERPL-SCNC: 3.6 MMOL/L (ref 3.5–5.1)
RBC # BLD AUTO: 2.9 M/UL (ref 4–5.4)
RHEUMATOID FACT SERPL-ACNC: <10 IU/ML (ref 0–15)
SODIUM SERPL-SCNC: 137 MMOL/L (ref 136–145)
WBC # BLD AUTO: 16.31 K/UL (ref 3.9–12.7)

## 2020-03-11 PROCEDURE — 99233 PR SUBSEQUENT HOSPITAL CARE,LEVL III: ICD-10-PCS | Mod: ,,, | Performed by: INTERNAL MEDICINE

## 2020-03-11 PROCEDURE — 86431 RHEUMATOID FACTOR QUANT: CPT

## 2020-03-11 PROCEDURE — 36415 COLL VENOUS BLD VENIPUNCTURE: CPT

## 2020-03-11 PROCEDURE — 86140 C-REACTIVE PROTEIN: CPT

## 2020-03-11 PROCEDURE — 99233 SBSQ HOSP IP/OBS HIGH 50: CPT | Mod: ,,, | Performed by: INTERNAL MEDICINE

## 2020-03-11 PROCEDURE — 85651 RBC SED RATE NONAUTOMATED: CPT

## 2020-03-11 PROCEDURE — 86200 CCP ANTIBODY: CPT

## 2020-03-11 PROCEDURE — 63600175 PHARM REV CODE 636 W HCPCS: Performed by: INTERNAL MEDICINE

## 2020-03-11 PROCEDURE — 85025 COMPLETE CBC W/AUTO DIFF WBC: CPT

## 2020-03-11 PROCEDURE — 86038 ANTINUCLEAR ANTIBODIES: CPT

## 2020-03-11 PROCEDURE — 80048 BASIC METABOLIC PNL TOTAL CA: CPT

## 2020-03-11 PROCEDURE — 25000003 PHARM REV CODE 250: Performed by: INTERNAL MEDICINE

## 2020-03-11 RX ORDER — BUTALBITAL, ACETAMINOPHEN AND CAFFEINE 50; 325; 40 MG/1; MG/1; MG/1
1 TABLET ORAL EVERY 6 HOURS PRN
Qty: 20 TABLET | Refills: 0 | Status: SHIPPED | OUTPATIENT
Start: 2020-03-11 | End: 2020-04-10

## 2020-03-11 RX ADMIN — Medication 250 MG: at 08:03

## 2020-03-11 RX ADMIN — CEFTRIAXONE 2 G: 2 INJECTION, SOLUTION INTRAVENOUS at 08:03

## 2020-03-11 RX ADMIN — FERROUS SULFATE TAB EC 325 MG (65 MG FE EQUIVALENT) 325 MG: 325 (65 FE) TABLET DELAYED RESPONSE at 08:03

## 2020-03-11 NOTE — ASSESSMENT & PLAN NOTE
- WBC slowly trending up 13 -> 15 -> 16. ? Inflammatory rather than infectious   - Afebrile >24 hrs  - Appreciate Gyn input. No gynecologic source indentified

## 2020-03-11 NOTE — SUBJECTIVE & OBJECTIVE
"Interval History:  Pt seen and examined at bedside. She reports continued chills and sweats with Tmax 100.1 overnight. R ear continues to feel "stuffy".     Review of Systems   Constitutional: Positive for chills. Negative for fever.   Respiratory: Negative for cough and shortness of breath.    Cardiovascular: Negative for chest pain and palpitations.   Gastrointestinal: Negative for abdominal pain, nausea and vomiting.     Objective:     Vital Signs (Most Recent):  Temp: 98.7 °F (37.1 °C) (03/11/20 1117)  Pulse: 110 (03/11/20 1117)  Resp: 17 (03/11/20 1117)  BP: 122/68 (03/11/20 1117)  SpO2: 97 % (03/11/20 1117) Vital Signs (24h Range):  Temp:  [98.3 °F (36.8 °C)-100.1 °F (37.8 °C)] 98.7 °F (37.1 °C)  Pulse:  [] 110  Resp:  [16-20] 17  SpO2:  [96 %-100 %] 97 %  BP: (110-135)/(63-77) 122/68     Weight: 57.2 kg (126 lb)  Body mass index is 22.32 kg/m².    Intake/Output Summary (Last 24 hours) at 3/11/2020 1438  Last data filed at 3/11/2020 0744  Gross per 24 hour   Intake 480 ml   Output --   Net 480 ml      Physical Exam   Constitutional: She is oriented to person, place, and time. She appears well-developed and well-nourished. No distress.   HENT:   Head: Normocephalic.   Right Ear: External ear normal.   R tympanic membrane WNL, no erythema or fluid on otoscopic exam   Cardiovascular: Normal rate, regular rhythm and intact distal pulses.   Pulmonary/Chest: Effort normal and breath sounds normal. No respiratory distress.   Abdominal: Soft. Bowel sounds are normal. She exhibits no distension. There is no tenderness.   Musculoskeletal: Normal range of motion. She exhibits no edema.   Neurological: She is alert and oriented to person, place, and time.   Skin: Skin is warm and dry.   Psychiatric: She has a normal mood and affect.   Nursing note and vitals reviewed.    Significant Labs:   CBC:  Recent Labs   Lab 03/09/20  0455 03/10/20  0557 03/11/20  0523   WBC 13.47* 15.68* 16.31*   HGB 7.9* 8.1* 8.1*   HCT " 24.0* 24.7* 24.6*    371* 384*   GRAN 88.7*  11.9* 84.7*  13.3* 87.4*  14.3*   LYMPH 6.7*  0.9* 7.9*  1.2 6.3*  1.0   MONO 1.9*  0.3 2.2*  0.4 3.1*  0.5   EOS 0.1 0.1 0.1   BASO 0.02 0.02 0.03      CMP:  Recent Labs   Lab 03/09/20  0455 03/10/20  0557 03/11/20  0523    139 137   K 3.9 3.4* 3.6    106 104   CO2 26 27 25   BUN 6 8 7   CREATININE 0.6 0.6 0.6   GLU 97 93 98   CALCIUM 8.1* 7.9* 8.1*   ANIONGAP 8 6* 8      Significant Imaging:   MRI brain: normal

## 2020-03-11 NOTE — PROGRESS NOTES
[facility] Hospital Medicine  Progress Note    Patient Name: Melinda Parekh  MRN: [id number]  Patient Class: IP- Inpatient   Admission Date: 3/3/2020  Length of Stay: 8 days  Attending Physician: Caprice López MD  Primary Care Provider: Maicol Jensen MD        Subjective:     Principal Problem:Meningitis        HPI:  Ms. Parekh is a 29/F with PMH significant for recent vaginal delivery 01/03/20 with episiotomy, subsequent infection with prevotella spp. s/p treatment with amoxicillin-clavulanate and metronidazole who presented to ED 03/03 with a reported 9 day history of illness. She reports symptoms began with diffuse body aches, fever, chills, and fatigue. Tmax at home has been up to 102F. Subsequently developed diarrhea with two episodes daily. As symptoms persisted she visited her PCP 02/26; rapid flu and strep were negative and she was presumed to have gastroenteritis and was treated symptomatically. With progressive body aches presented to ED 02/27 and had nausea/vomiting in addition. Evaluation at that time was notable for no significant leukocytosis, equivocal UA (urine culture no growth), no CK elevation, tachycardia that resolved with fluids and she was subsequently discharged home; empiric antibiotic therapy was held at that time. Upon return home her symptoms continued to worsen. She then developed dizziness, visual disturbance and headache; denied neck pain or stiffness. She subsequently presented to ED 03/03 for further evaluation. ED workup was notable for leukocytosis, Tmax 100.4F, mild transaminitis. Hospital medicine was contacted for admission.    Overview/Hospital Course:  Admitted and CSF studies most concerning for bacterial meningitis. 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 CSF culture no growth and blood cultures negative. Had repeat fevers on 03/06; discussed with ID, suspect likely viral given broad spectrum antibiotic use, but  "will continue empiric treatment for bacterial for a total 10 day course. Had some abdominal pain and mild transaminitis on presentation. U/S with hepatosplenomegaly. Discussed with GI and will follow-up in clinic. She continued to have low grade fever but this improved as well, although she continued to have elevated temperature associated with sweats and tachycardia. All viral and bacterial studies were negative.    Interval History:  Pt seen and examined at bedside. She reports continued chills and sweats with Tmax 100.1 overnight. R ear continues to feel "stuffy".     Review of Systems   Constitutional: Positive for chills. Negative for fever.   Respiratory: Negative for cough and shortness of breath.    Cardiovascular: Negative for chest pain and palpitations.   Gastrointestinal: Negative for abdominal pain, nausea and vomiting.     Objective:     Vital Signs (Most Recent):  Temp: 98.7 °F (37.1 °C) (03/11/20 1117)  Pulse: 110 (03/11/20 1117)  Resp: 17 (03/11/20 1117)  BP: 122/68 (03/11/20 1117)  SpO2: 97 % (03/11/20 1117) Vital Signs (24h Range):  Temp:  [98.3 °F (36.8 °C)-100.1 °F (37.8 °C)] 98.7 °F (37.1 °C)  Pulse:  [] 110  Resp:  [16-20] 17  SpO2:  [96 %-100 %] 97 %  BP: (110-135)/(63-77) 122/68     Weight: 57.2 kg (126 lb)  Body mass index is 22.32 kg/m².    Intake/Output Summary (Last 24 hours) at 3/11/2020 1438  Last data filed at 3/11/2020 0744  Gross per 24 hour   Intake 480 ml   Output --   Net 480 ml      Physical Exam   Constitutional: She is oriented to person, place, and time. She appears well-developed and well-nourished. No distress.   HENT:   Head: Normocephalic.   Right Ear: External ear normal.   R tympanic membrane WNL, no erythema or fluid on otoscopic exam   Cardiovascular: Normal rate, regular rhythm and intact distal pulses.   Pulmonary/Chest: Effort normal and breath sounds normal. No respiratory distress.   Abdominal: Soft. Bowel sounds are normal. She exhibits no distension. " There is no tenderness.   Musculoskeletal: Normal range of motion. She exhibits no edema.   Neurological: She is alert and oriented to person, place, and time.   Skin: Skin is warm and dry.   Psychiatric: She has a normal mood and affect.   Nursing note and vitals reviewed.    Significant Labs:   CBC:  Recent Labs   Lab 03/09/20  0455 03/10/20  0557 03/11/20  0523   WBC 13.47* 15.68* 16.31*   HGB 7.9* 8.1* 8.1*   HCT 24.0* 24.7* 24.6*    371* 384*   GRAN 88.7*  11.9* 84.7*  13.3* 87.4*  14.3*   LYMPH 6.7*  0.9* 7.9*  1.2 6.3*  1.0   MONO 1.9*  0.3 2.2*  0.4 3.1*  0.5   EOS 0.1 0.1 0.1   BASO 0.02 0.02 0.03      CMP:  Recent Labs   Lab 03/09/20  0455 03/10/20  0557 03/11/20  0523    139 137   K 3.9 3.4* 3.6    106 104   CO2 26 27 25   BUN 6 8 7   CREATININE 0.6 0.6 0.6   GLU 97 93 98   CALCIUM 8.1* 7.9* 8.1*   ANIONGAP 8 6* 8      Significant Imaging:   MRI brain: normal      Assessment/Plan:      * Meningitis  - Meningitis with LP 03/03 demonstrating significant elevation in WBCs, low glucose, and elevated protein but cultures negative  - Appreciate ID input. Ampicillin stopped. Continue Ceftriaxone 2 gm q12 hr until 3/14/20 to complete 10 day course per ID  - Labs more consistent with bacterial though given duration, feel that viral may be more likely  - CSF culture no growth to date. Blood cultures negative. Repeat cultures in process, no growth to date.  - Further viral studies including WNV, EBV also negative  - MRI brain unremarkable  - ? Fever due to rheumatologic condition, although no other symptoms. Will send rheum screening studies  - Continued reassurance provided    Iron deficiency anemia  - Hgb mildly low since 10/2019. No evidence of acute bleed.  - Continuing ferrous sulfate 325mg PO every other day, ascorbic acid 250mg PO every other day.    Transaminitis  - Resolved  - Mild; unclear etiology but suspect related to viral illness  - U/S Abd with mild hepatomegaly; acute  hepatitis panel negative.  - EBV negative.  - Will follow-up with GI in the outpatient setting.    Leukocytosis  - WBC slowly trending up 13 -> 15 -> 16. ? Inflammatory rather than infectious   - Afebrile >24 hrs  - Appreciate Gyn input. No gynecologic source indentified      VTE Risk Mitigation (From admission, onward)         Ordered     enoxaparin injection 40 mg  Daily      03/03/20 1645     IP VTE HIGH RISK PATIENT  Once      03/03/20 1645     Place sequential compression device  Until discontinued      03/03/20 1645                      Caprice López MD  Department of Hospital Medicine   [facility]

## 2020-03-11 NOTE — NURSING
Pt AAOx4, VSS on RA and afebrile. Discharge paperwork given, pt verbalizes understanding. Prescriptions given to pt per MD. IV removed w/ cath tip intact, WNL.Voiding spontaneously with difficulty. Ambulating independently without difficulty. Tolerating regular diet. To be DCd home w/ family-- will be escorted downstairs via  transport team once dressed, ready & ride arrives. Free from falls, injury, or skin breakdown this hospital admission.

## 2020-03-11 NOTE — DISCHARGE SUMMARY
[facility] Hospital Medicine  Discharge Summary      Patient Name: Melinda Parekh  MRN: [id number]  Admission Date: 3/3/2020  Hospital Length of Stay: 8 days  Discharge Date and Time:  03/11/2020 4:47 PM  Attending Physician: Caprice López MD   Discharging Provider: Caprice López MD  Primary Care Provider: Maicol Jensen MD      HPI:   Ms. Parekh is a 29/F with PMH significant for recent vaginal delivery 01/03/20 with episiotomy, subsequent infection with prevotella spp. s/p treatment with amoxicillin-clavulanate and metronidazole who presented to ED 03/03 with a reported 9 day history of illness. She reports symptoms began with diffuse body aches, fever, chills, and fatigue. Tmax at home has been up to 102F. Subsequently developed diarrhea with two episodes daily. As symptoms persisted she visited her PCP 02/26; rapid flu and strep were negative and she was presumed to have gastroenteritis and was treated symptomatically. With progressive body aches presented to ED 02/27 and had nausea/vomiting in addition. Evaluation at that time was notable for no significant leukocytosis, equivocal UA (urine culture no growth), no CK elevation, tachycardia that resolved with fluids and she was subsequently discharged home; empiric antibiotic therapy was held at that time. Upon return home her symptoms continued to worsen. She then developed dizziness, visual disturbance and headache; denied neck pain or stiffness. She subsequently presented to ED 03/03 for further evaluation. ED workup was notable for leukocytosis, Tmax 100.4F, mild transaminitis. Hospital medicine was contacted for admission.    * No surgery found *      Hospital Course:   Admitted and CSF studies most concerning for bacterial meningitis. 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 CSF culture no growth and blood cultures negative. Had repeat fevers on 03/06; discussed with ID, suspect likely viral given  broad spectrum antibiotic use, but will continue empiric treatment for bacterial for a total 10 day course. Had some abdominal pain and mild transaminitis on presentation. U/S with hepatosplenomegaly. Discussed with GI and will follow-up in clinic. She continued to have low grade fever but this improved as well, although she continued to have elevated temperature associated with sweats and tachycardia. All viral and bacterial studies were negative. She had slow improvement in symptoms and received 8 days of empiric Rocephin. She continued to have leukocytosis without etiology. Rheumatologic studies were ordered but pending at the time of discharge. She is stable for discharge home today and will f/u with her PCP for f/u on blood tests. Given the acuity of her symptoms, its unlikely that her symptoms are rheumatologic.     Consults:   Consults (From admission, onward)        Status Ordering Provider     Inpatient consult to Gastroenterology  Once     Provider:  Zain Lowery MD    Acknowledged EMY OLIVEIRA     Inpatient consult to Infectious Diseases  Once     Provider:  Denise Duke MD    Completed EMY OLIVEIRA          No new Assessment & Plan notes have been filed under this hospital service since the last note was generated.  Service: Hospital Medicine    Final Active Diagnoses:    Diagnosis Date Noted POA    PRINCIPAL PROBLEM:  Meningitis [G03.9] 03/03/2020 Yes    Leukocytosis [D72.829] 03/03/2020 Yes    Transaminitis [R74.0] 03/03/2020 Yes    Iron deficiency anemia [D50.9] 03/03/2020 Yes      Problems Resolved During this Admission:       Discharged Condition: good    Disposition: Home or Self Care    Follow Up:  Follow-up Information     Maicol Jensen MD In 1 week.    Specialty:  Family Medicine  Why:  hospital follow-up, follow-up on labs  Contact information:  [address]  [address]  [facility] [address] [phone]                 Patient Instructions:      Diet Adult  Regular     Activity as tolerated       Significant Diagnostic Studies: Labs:   BMP:   Recent Labs   Lab 03/10/20  0557 03/11/20  0523   GLU 93 98    137   K 3.4* 3.6    104   CO2 27 25   BUN 8 7   CREATININE 0.6 0.6   CALCIUM 7.9* 8.1*   , CBC   Recent Labs   Lab 03/10/20  0557 03/11/20  0523   WBC 15.68* 16.31*   HGB 8.1* 8.1*   HCT 24.7* 24.6*   * 384*    and All labs within the past 24 hours have been reviewed    Pending Diagnostic Studies:     Procedure Component Value Units Date/Time    HEATHER [[id number]] Collected:  03/11/20 1440    Order Status:  Sent Lab Status:  In process Updated:  03/11/20 1449    Specimen:  Blood     Cyclic citrul peptide antibody, IgG [[id number]] Collected:  03/11/20 1440    Order Status:  Sent Lab Status:  In process Updated:  03/11/20 1449    Specimen:  Blood     Freeze and Hold,  [[id number]] Collected:  03/03/20 1655    Order Status:  Sent Lab Status:  No result     Specimen:  CSF (Spinal Fluid) from Cerebrospinal Fluid     Rheumatoid factor [[id number]] Collected:  03/11/20 1440    Order Status:  Sent Lab Status:  In process Updated:  03/11/20 1449    Specimen:  Blood          Medications:  Reconciled Home Medications:      Medication List      START taking these medications    butalbital-acetaminophen-caffeine -40 mg -40 mg per tablet  Commonly known as:  FIORICET, ESGIC  Take 1 tablet by mouth every 6 (six) hours as needed for Headaches.        CONTINUE taking these medications    norethindrone-ethinyl estradiol-iron 1-20(5)/1-30(7) /1mg-35mcg (9) Tab  Commonly known as:  ESTROSTEP FE  Take by mouth once daily.     ondansetron 4 MG Tbdl  Commonly known as:  ZOFRAN-ODT  Take 1 tablet (4 mg total) by mouth every 8 (eight) hours as needed.            Indwelling Lines/Drains at time of discharge:   Lines/Drains/Airways     None                 Time spent on the discharge of patient: 35 minutes  Patient was seen and examined on the date of discharge and  determined to be suitable for discharge.         Caprice López MD  Department of Hospital Medicine  [facility]

## 2020-03-12 LAB
BACTERIA SPEC AEROBE CULT: NO GROWTH
GPP - ADENOVIRUS 40/41: NOT DETECTED
GPP - CAMPYLOBACTER: NOT DETECTED
GPP - CLOSTRIDIUM DIFFICILE TOXIN A/B: NOT DETECTED
GPP - CRYPTOSPORIDIUM: NOT DETECTED
GPP - E COLI O157: NOT DETECTED
GPP - ENTAMOEBA HISTOLYTICA: NOT DETECTED
GPP - ENTEROTOXIGENIC E COLI (ETEC): NOT DETECTED
GPP - GIARDIA LAMBLIA: NOT DETECTED
GPP - NOROVIRUS GI/GII: NOT DETECTED
GPP - ROTAVIRUS A: NOT DETECTED
GPP - SALMONELLA: NOT DETECTED
GPP - SHIGELLA: NOT DETECTED
GPP - VIBRIO CHOLERA: NOT DETECTED
GPP - YERSINIA ENTEROCOLITICA: NOT DETECTED
LACTATE PLASV-SCNC: NOT DETECTED MMOL/L

## 2020-03-12 NOTE — PLAN OF CARE
Patient discharge home. Follow up appointment added to AVS. Family provided transportation home. No further needs at discharge.            03/12/20 1004   Final Note   Assessment Type Final Discharge Note   Anticipated Discharge Disposition Home   What phone number can be called within the next 1-3 days to see how you are doing after discharge?   ([phone])   Hospital Follow Up  Appt(s) scheduled? Yes   Discharge plans and expectations educations in teach back method with documentation complete? Yes

## 2020-03-12 NOTE — ASSESSMENT & PLAN NOTE
29F with history of vaginal delivery on 1/03/20 requiring episiotomy and repair presents with a week of fevers, chills, myalgias, arthalgias, nausea, vomiting, blanching rash, followed by acute onset of headache, neck stiffness, and blurry vision.      Patient reports onset of illness started after going to [facility].  Denied any known sick contacts, no recent history of eating unpasturized cheese or deli meat.  Lives near [facility] with possible mosquito bite.  Two outdoor dogs at home, no recent travel, no outdoor hobbies.    CSF studies notable for  (68% segs, 4% lymph), elevated protein 174, low glucose 39. HSV PCR negative. WNV neg.  Bacterial cultures negative.  No additional CSF to perform arbovirus panel.      Patient with ongoing fevers despite broad empiric meningitis coverage - more suggestive of ongoing viral process.  Resp infection panel negative.  EBV negative.  S pneumo urine antigen negative    Recommendations:  - Discontinue ceftriaxone IV (completed 8 day course)  - Okay to discharge home, patient aware to return to ED or contact provider with any worsening symptoms

## 2020-03-12 NOTE — SUBJECTIVE & OBJECTIVE
Interval History:  Continues to have low grade fevers  Reports chills have resolved  Continues to have headache, but improved compared to on admission    Review of Systems   Constitutional: Positive for activity change and fatigue. Negative for chills, diaphoresis and fever.   HENT: Negative for rhinorrhea and sore throat.    Respiratory: Positive for cough. Negative for shortness of breath.    Cardiovascular: Negative for chest pain, palpitations and leg swelling.   Gastrointestinal: Negative for abdominal distention, abdominal pain, diarrhea, nausea and vomiting.   Genitourinary: Negative for dysuria and hematuria.   Musculoskeletal: Negative for arthralgias and myalgias.   Skin: Negative for rash.   Neurological: Positive for headaches.     Objective:     Vital Signs (Most Recent):  Temp: 99.1 °F (37.3 °C) (03/11/20 1548)  Pulse: (!) 115 (03/11/20 1548)  Resp: 18 (03/11/20 1548)  BP: 123/73 (03/11/20 1548)  SpO2: 99 % (03/11/20 1548) Vital Signs (24h Range):  Temp:  [98.3 °F (36.8 °C)-100.1 °F (37.8 °C)] 99.1 °F (37.3 °C)  Pulse:  [] 115  Resp:  [16-20] 18  SpO2:  [96 %-99 %] 99 %  BP: (110-135)/(63-77) 123/73     Weight: 57.2 kg (126 lb)  Body mass index is 22.32 kg/m².      Physical Exam   Constitutional: She is oriented to person, place, and time. She appears well-developed and well-nourished. No distress.   HENT:   Head: Normocephalic and atraumatic.   Eyes: Conjunctivae and EOM are normal.   Neck: Normal range of motion. Neck supple.   Pulmonary/Chest: Effort normal. No respiratory distress.   Abdominal: Soft. She exhibits no distension.   Musculoskeletal: Normal range of motion. She exhibits no edema.   Neurological: She is alert and oriented to person, place, and time.   Skin: Skin is warm and dry. No rash noted. She is not diaphoretic. No erythema.   Psychiatric: She has a normal mood and affect. Her behavior is normal.   Vitals reviewed.    Significant Labs:   All labs within the last 24 hours  reviewed    Significant Imaging:   All imaging within the last 24 hours reviewed

## 2020-03-12 NOTE — PROGRESS NOTES
[facility]  Infectious Disease  Progress Note    Patient Name: Melinda Parekh  MRN: [id number]  Admission Date: 3/3/2020  Length of Stay: 8 days  Attending Physician: No att. providers found  Primary Care Provider: Maicol Jensen MD    Isolation Status: No active isolations  Assessment/Plan:      * Meningitis  29F with history of vaginal delivery on 1/03/20 requiring episiotomy and repair presents with a week of fevers, chills, myalgias, arthalgias, nausea, vomiting, blanching rash, followed by acute onset of headache, neck stiffness, and blurry vision.      Patient reports onset of illness started after going to [employer].  Denied any known sick contacts, no recent history of eating unpasturized cheese or deli meat.  [place] [facility] with possible mosquito bite.  Two outdoor dogs at home, no recent travel, no outdoor hobbies.    CSF studies notable for  (68% segs, 4% lymph), elevated protein 174, low glucose 39. HSV PCR negative. WNV neg.  Bacterial cultures negative.  No additional CSF to perform arbovirus panel.      Patient with ongoing fevers despite broad empiric meningitis coverage - more suggestive of ongoing viral process.  Resp infection panel negative.  EBV negative.  S pneumo urine antigen negative    Recommendations:  - Discontinue ceftriaxone IV (completed 8 day course)  - Okay to discharge home, patient aware to return to ED or contact provider with any worsening symptoms        Thank you for your consult. I will sign off. Please contact us if you have any additional questions.    Irma Lopez MD  Infectious Disease  [facility]    Subjective:     Principal Problem:Meningitis    HPI: 29F with h/o vaginal delivery on 1/03/20 c/b episiotomy admitted overnight with 9 days of fever tmax 102, bodyaches/shaking chills. Says she ultimately came to hospital because of severe headache and sensation of dizziness and blurry vision. Also reports loose stools and  abdominal pain during this time period. Over past 3d reports dry cough and feeling short winded. Says she noticed fine rash on bilatearl thighs and abdomen when she took shower prior to arrival. Reports having taken 2 rounds of antibiotics recently for what she says was a skin infection from her episiotomy (thinks she was given clindamycin; also note in chart for metronidazole and augmentin), but denies recent abx use and says surgical scar now healed. Denies sick contacts.         Interval History:  Continues to have low grade fevers  Reports chills have resolved  Continues to have headache, but improved compared to on admission    Review of Systems   Constitutional: Positive for activity change and fatigue. Negative for chills, diaphoresis and fever.   HENT: Negative for rhinorrhea and sore throat.    Respiratory: Positive for cough. Negative for shortness of breath.    Cardiovascular: Negative for chest pain, palpitations and leg swelling.   Gastrointestinal: Negative for abdominal distention, abdominal pain, diarrhea, nausea and vomiting.   Genitourinary: Negative for dysuria and hematuria.   Musculoskeletal: Negative for arthralgias and myalgias.   Skin: Negative for rash.   Neurological: Positive for headaches.     Objective:     Vital Signs (Most Recent):  Temp: 99.1 °F (37.3 °C) (03/11/20 1548)  Pulse: (!) 115 (03/11/20 1548)  Resp: 18 (03/11/20 1548)  BP: 123/73 (03/11/20 1548)  SpO2: 99 % (03/11/20 1548) Vital Signs (24h Range):  Temp:  [98.3 °F (36.8 °C)-100.1 °F (37.8 °C)] 99.1 °F (37.3 °C)  Pulse:  [] 115  Resp:  [16-20] 18  SpO2:  [96 %-99 %] 99 %  BP: (110-135)/(63-77) 123/73     Weight: 57.2 kg (126 lb)  Body mass index is 22.32 kg/m².      Physical Exam   Constitutional: She is oriented to person, place, and time. She appears well-developed and well-nourished. No distress.   HENT:   Head: Normocephalic and atraumatic.   Eyes: Conjunctivae and EOM are normal.   Neck: Normal range of motion.  Neck supple.   Pulmonary/Chest: Effort normal. No respiratory distress.   Abdominal: Soft. She exhibits no distension.   Musculoskeletal: Normal range of motion. She exhibits no edema.   Neurological: She is alert and oriented to person, place, and time.   Skin: Skin is warm and dry. No rash noted. She is not diaphoretic. No erythema.   Psychiatric: She has a normal mood and affect. Her behavior is normal.   Vitals reviewed.    Significant Labs:   All labs within the last 24 hours reviewed    Significant Imaging:   All imaging within the last 24 hours reviewed

## 2020-03-12 NOTE — PLAN OF CARE
CM called patient [phone] and a left a message of time and date of hospital follow up with PCP.

## 2020-03-13 LAB — BACTERIA SPEC ANAEROBE CULT: ABNORMAL

## 2020-03-18 ENCOUNTER — CLINICAL SUPPORT (OUTPATIENT)
Dept: PRIMARY CARE CLINIC | Facility: CLINIC | Age: 30
End: 2020-03-18
Payer: COMMERCIAL

## 2020-03-18 ENCOUNTER — PATIENT MESSAGE (OUTPATIENT)
Dept: PRIMARY CARE CLINIC | Facility: CLINIC | Age: 30
End: 2020-03-18

## 2020-03-18 ENCOUNTER — OFFICE VISIT (OUTPATIENT)
Dept: PRIMARY CARE CLINIC | Facility: CLINIC | Age: 30
End: 2020-03-18
Payer: COMMERCIAL

## 2020-03-18 VITALS
OXYGEN SATURATION: 97 % | TEMPERATURE: 99 F | DIASTOLIC BLOOD PRESSURE: 72 MMHG | WEIGHT: 115.31 LBS | RESPIRATION RATE: 18 BRPM | HEART RATE: 118 BPM | SYSTOLIC BLOOD PRESSURE: 114 MMHG | BODY MASS INDEX: 20.43 KG/M2 | HEIGHT: 63 IN

## 2020-03-18 DIAGNOSIS — D50.9 IRON DEFICIENCY ANEMIA, UNSPECIFIED IRON DEFICIENCY ANEMIA TYPE: ICD-10-CM

## 2020-03-18 DIAGNOSIS — D72.829 LEUKOCYTOSIS, UNSPECIFIED TYPE: ICD-10-CM

## 2020-03-18 DIAGNOSIS — R00.0 TACHYCARDIA: ICD-10-CM

## 2020-03-18 DIAGNOSIS — D50.9 IRON DEFICIENCY ANEMIA, UNSPECIFIED IRON DEFICIENCY ANEMIA TYPE: Primary | ICD-10-CM

## 2020-03-18 DIAGNOSIS — A87.9 VIRAL MENINGITIS: ICD-10-CM

## 2020-03-18 DIAGNOSIS — D72.829 LEUKOCYTOSIS, UNSPECIFIED TYPE: Primary | ICD-10-CM

## 2020-03-18 PROBLEM — R74.01 TRANSAMINITIS: Status: RESOLVED | Noted: 2020-03-03 | Resolved: 2020-03-18

## 2020-03-18 LAB
ALBUMIN SERPL BCP-MCNC: 2.8 G/DL (ref 3.5–5.2)
ALP SERPL-CCNC: 60 U/L (ref 38–126)
ALT SERPL W/O P-5'-P-CCNC: 29 U/L (ref 14–54)
ANA SER QL IF: NORMAL
ANION GAP SERPL CALC-SCNC: 10 MMOL/L (ref 8–16)
AST SERPL-CCNC: 30 U/L (ref 15–41)
BASOPHILS # BLD AUTO: 0 K/UL (ref 0–0.2)
BASOPHILS NFR BLD: 0.3 % (ref 0–1.9)
BILIRUB SERPL-MCNC: 0.7 MG/DL (ref 0.3–1.2)
BUN SERPL-MCNC: 15 MG/DL (ref 6–20)
CALCIUM SERPL-MCNC: 8.5 MG/DL (ref 8.6–10)
CHLORIDE SERPL-SCNC: 98 MMOL/L (ref 101–111)
CO2 SERPL-SCNC: 27 MMOL/L (ref 23–29)
CREAT SERPL-MCNC: 0.7 MG/DL (ref 0.5–1.4)
DIFFERENTIAL METHOD: ABNORMAL
EOSINOPHIL # BLD AUTO: 0.1 K/UL (ref 0–0.5)
EOSINOPHIL NFR BLD: 0.6 % (ref 0–8)
ERYTHROCYTE [DISTWIDTH] IN BLOOD BY AUTOMATED COUNT: 12.3 % (ref 11.5–14.5)
EST. GFR  (AFRICAN AMERICAN): >60 ML/MIN/1.73 M^2
EST. GFR  (NON AFRICAN AMERICAN): >60 ML/MIN/1.73 M^2
GLUCOSE SERPL-MCNC: 92 MG/DL (ref 74–118)
HCT VFR BLD AUTO: 29.6 % (ref 37–48.5)
HGB BLD-MCNC: 10 G/DL (ref 12–16)
LYMPHOCYTES # BLD AUTO: 1.6 K/UL (ref 1–4.8)
LYMPHOCYTES NFR BLD: 11 % (ref 18–48)
MCH RBC QN AUTO: 28.2 PG (ref 27–31)
MCHC RBC AUTO-ENTMCNC: 33.8 G/DL (ref 32–36)
MCV RBC AUTO: 84 FL (ref 82–98)
MONOCYTES # BLD AUTO: 0.4 K/UL (ref 0.3–1)
MONOCYTES NFR BLD: 3.1 % (ref 4–15)
NEUTROPHILS # BLD AUTO: 11.9 K/UL (ref 1.8–7.7)
NEUTROPHILS NFR BLD: 85 % (ref 38–73)
PLATELET # BLD AUTO: 549 K/UL (ref 150–350)
PMV BLD AUTO: 7.1 FL (ref 9.2–12.9)
POTASSIUM SERPL-SCNC: 3.8 MMOL/L (ref 3.5–5.1)
PROT SERPL-MCNC: 6.8 G/DL (ref 6–8.4)
RBC # BLD AUTO: 3.55 M/UL (ref 4–5.4)
SODIUM SERPL-SCNC: 135 MMOL/L (ref 136–145)
TSH SERPL DL<=0.005 MIU/L-ACNC: 0.56 UIU/ML (ref 0.45–5.33)
WBC # BLD AUTO: 14.1 K/UL (ref 3.9–12.7)

## 2020-03-18 PROCEDURE — 99214 PR OFFICE/OUTPT VISIT, EST, LEVL IV, 30-39 MIN: ICD-10-PCS | Mod: S$GLB,,, | Performed by: FAMILY MEDICINE

## 2020-03-18 PROCEDURE — 3008F PR BODY MASS INDEX (BMI) DOCUMENTED: ICD-10-PCS | Mod: CPTII,S$GLB,, | Performed by: FAMILY MEDICINE

## 2020-03-18 PROCEDURE — 99999 PR PBB SHADOW E&M-EST. PATIENT-LVL I: ICD-10-PCS | Mod: PBBFAC,,,

## 2020-03-18 PROCEDURE — 85025 COMPLETE CBC W/AUTO DIFF WBC: CPT

## 2020-03-18 PROCEDURE — 84443 ASSAY THYROID STIM HORMONE: CPT

## 2020-03-18 PROCEDURE — 99999 PR PBB SHADOW E&M-EST. PATIENT-LVL III: CPT | Mod: PBBFAC,,, | Performed by: FAMILY MEDICINE

## 2020-03-18 PROCEDURE — 80053 COMPREHEN METABOLIC PANEL: CPT

## 2020-03-18 PROCEDURE — 99999 PR PBB SHADOW E&M-EST. PATIENT-LVL I: CPT | Mod: PBBFAC,,,

## 2020-03-18 PROCEDURE — 99999 PR PBB SHADOW E&M-EST. PATIENT-LVL III: ICD-10-PCS | Mod: PBBFAC,,, | Performed by: FAMILY MEDICINE

## 2020-03-18 PROCEDURE — 99214 OFFICE O/P EST MOD 30 MIN: CPT | Mod: S$GLB,,, | Performed by: FAMILY MEDICINE

## 2020-03-18 PROCEDURE — 36415 PR COLLECTION VENOUS BLOOD,VENIPUNCTURE: ICD-10-PCS | Mod: S$GLB,,, | Performed by: FAMILY MEDICINE

## 2020-03-18 PROCEDURE — 36415 COLL VENOUS BLD VENIPUNCTURE: CPT | Mod: S$GLB,,, | Performed by: FAMILY MEDICINE

## 2020-03-18 PROCEDURE — 3008F BODY MASS INDEX DOCD: CPT | Mod: CPTII,S$GLB,, | Performed by: FAMILY MEDICINE

## 2020-03-18 RX ORDER — AZITHROMYCIN 250 MG/1
1 TABLET, FILM COATED ORAL DAILY
Status: ON HOLD | COMMUNITY
Start: 2020-02-24 | End: 2020-04-04 | Stop reason: HOSPADM

## 2020-03-18 RX ORDER — FERROUS SULFATE 325(65) MG
325 TABLET ORAL DAILY
COMMUNITY
End: 2020-03-18 | Stop reason: SDUPTHER

## 2020-03-18 RX ORDER — FERROUS SULFATE 325(65) MG
325 TABLET ORAL 2 TIMES DAILY
COMMUNITY
Start: 2020-03-18 | End: 2020-12-14 | Stop reason: ALTCHOICE

## 2020-03-18 NOTE — PROGRESS NOTES
"Subjective:       Patient ID: Melinda Parekh is a 29 y.o. female.    Chief Complaint: Hospital Follow Up (says she was admitted for 8 days due to viral meningitis ) and Tachycardia    Developed febrile illness in late February, progressively worsened, ultimately admitted with acute viral meningitis, all CSF, blood and urine cultures negative. Had mild transient transaminitis, US showed mild hepatomegaly. Treated empirically with broad spectrum antibiotics and antivirals, discontinued after 8 days when all cultures negative. Throughout illness and post-discharge, has been persistently tachycardic, HR  on home monitor. Fevers resolved, low grade temp at times (). Energy level slowly improving. Headaches resolved. Started on iron in hospital, has continued to take    Review of Systems   Constitutional: Positive for fatigue. Negative for chills and fever.   HENT: Positive for congestion. Negative for trouble swallowing.    Respiratory: Negative for shortness of breath and wheezing.    Cardiovascular: Positive for palpitations.   Gastrointestinal: Negative for blood in stool, diarrhea, nausea and vomiting.   Genitourinary: Negative for difficulty urinating.   Skin: Negative for rash and wound.   Allergic/Immunologic: Negative for immunocompromised state.   Hematological: Does not bruise/bleed easily.   Psychiatric/Behavioral: Negative for agitation and confusion.       Objective:      Vitals:    03/18/20 0908   BP: 114/72   BP Location: Right arm   Patient Position: Sitting   BP Method: Medium (Manual)   Pulse: (!) 118   Resp: 18   Temp: 98.8 °F (37.1 °C)   TempSrc: Oral   SpO2: 97%   Weight: 52.3 kg (115 lb 4.8 oz)   Height: 5' 3" (1.6 m)     Physical Exam   Constitutional: She is oriented to person, place, and time. She appears well-developed and well-nourished.   HENT:   Head: Normocephalic and atraumatic.   Eyes: EOM are normal.   Neck: Neck supple. No JVD present. Carotid bruit is not present. "   Cardiovascular: Regular rhythm and normal heart sounds. Tachycardia present.   Pulses:       Radial pulses are 2+ on the right side, and 2+ on the left side.   Pulmonary/Chest: Effort normal and breath sounds normal.   Abdominal: Soft. Bowel sounds are normal. She exhibits no distension. There is no tenderness.   Musculoskeletal: She exhibits no edema.   Neurological: She is alert and oriented to person, place, and time.   Skin: Skin is warm and dry.   Psychiatric: She has a normal mood and affect. Her behavior is normal.   Nursing note and vitals reviewed.      Lab Results   Component Value Date    WBC 16.31 (H) 03/11/2020    HGB 8.1 (L) 03/11/2020    HCT 24.6 (L) 03/11/2020     (H) 03/11/2020    ALT 24 03/08/2020    AST 24 03/08/2020     03/11/2020    K 3.6 03/11/2020     03/11/2020    CREATININE 0.6 03/11/2020    BUN 7 03/11/2020    CO2 25 03/11/2020    TSH 0.712 06/27/2019      Assessment:       1. Iron deficiency anemia, unspecified iron deficiency anemia type    2. Viral meningitis    3. Leukocytosis, unspecified type    4. Tachycardia        Plan:       Iron deficiency anemia, unspecified iron deficiency anemia type  -     CBC auto differential; Future; Expected date: 03/18/2020  -     ferrous sulfate (FEOSOL) 325 mg (65 mg iron) Tab tablet; Take 1 tablet (325 mg total) by mouth 2 (two) times daily.  Double up on FeSO4  Viral meningitis  -     CBC auto differential; Future; Expected date: 03/18/2020  -     Comprehensive metabolic panel; Future; Expected date: 03/18/2020    Leukocytosis, unspecified type  -     CBC auto differential; Future; Expected date: 03/18/2020    Tachycardia  -     Comprehensive metabolic panel; Future; Expected date: 03/18/2020  -     TSH; Future; Expected date: 03/18/2020  Likely due to metabolic stress of recent viral illness coupled with iron def anemia    Medication List with Changes/Refills   Current Medications    AZITHROMYCIN (Z-JEFF) 250 MG TABLET    Take 1  tablet by mouth once daily.    BUTALBITAL-ACETAMINOPHEN-CAFFEINE -40 MG (FIORICET, ESGIC) -40 MG PER TABLET    Take 1 tablet by mouth every 6 (six) hours as needed for Headaches.    NORETHINDRONE-ETHINYL ESTRADIOL-IRON (ESTROSTEP FE) 1-20(5)/1-30(7) /1MG-35MCG (9) TAB    Take by mouth once daily.   Changed and/or Refilled Medications    Modified Medication Previous Medication    FERROUS SULFATE (FEOSOL) 325 MG (65 MG IRON) TAB TABLET ferrous sulfate (FEOSOL) 325 mg (65 mg iron) Tab tablet       Take 1 tablet (325 mg total) by mouth 2 (two) times daily.    Take 325 mg by mouth once daily.   Discontinued Medications    ONDANSETRON (ZOFRAN-ODT) 4 MG TBDL    Take 1 tablet (4 mg total) by mouth every 8 (eight) hours as needed.

## 2020-03-19 ENCOUNTER — PATIENT MESSAGE (OUTPATIENT)
Dept: PRIMARY CARE CLINIC | Facility: CLINIC | Age: 30
End: 2020-03-19

## 2020-04-01 ENCOUNTER — OFFICE VISIT (OUTPATIENT)
Dept: PRIMARY CARE CLINIC | Facility: CLINIC | Age: 30
End: 2020-04-01
Payer: COMMERCIAL

## 2020-04-01 DIAGNOSIS — R07.89 ATYPICAL CHEST PAIN: ICD-10-CM

## 2020-04-01 DIAGNOSIS — D50.9 IRON DEFICIENCY ANEMIA, UNSPECIFIED IRON DEFICIENCY ANEMIA TYPE: Primary | ICD-10-CM

## 2020-04-01 DIAGNOSIS — M25.561 ARTHRALGIA OF BOTH KNEES: ICD-10-CM

## 2020-04-01 DIAGNOSIS — M25.562 ARTHRALGIA OF BOTH KNEES: ICD-10-CM

## 2020-04-01 DIAGNOSIS — R61 NIGHT SWEATS: ICD-10-CM

## 2020-04-01 DIAGNOSIS — R70.0 ELEVATED SED RATE: ICD-10-CM

## 2020-04-01 DIAGNOSIS — A87.9 VIRAL MENINGITIS: ICD-10-CM

## 2020-04-01 DIAGNOSIS — R79.82 CRP ELEVATED: ICD-10-CM

## 2020-04-01 PROBLEM — M25.569 ARTHRALGIA OF KNEE: Status: ACTIVE | Noted: 2020-04-01

## 2020-04-01 PROCEDURE — 99213 OFFICE O/P EST LOW 20 MIN: CPT | Mod: 95,,, | Performed by: FAMILY MEDICINE

## 2020-04-01 PROCEDURE — 99213 PR OFFICE/OUTPT VISIT, EST, LEVL III, 20-29 MIN: ICD-10-PCS | Mod: 95,,, | Performed by: FAMILY MEDICINE

## 2020-04-01 NOTE — PROGRESS NOTES
Subjective:       Patient ID: Melinda Parekh is a 29 y.o. female.    Chief Complaint: No chief complaint on file.    HPI: The patient location is:  home  The chief complaint leading to consultation is:  Anemia follow-up recent viral was encephalitis  Visit type: Virtual visit with synchronous audio and video  Total time spent with patient:  15 min  Each patient to whom he or she provides medical services by telemedicine is:  (1) informed of the relationship between the physician and patient and the respective role of any other health care provider with respect to management of the patient; and (2) notified that he or she may decline to receive medical services by telemedicine and may withdraw from such care at any time.    Notes:  History of present illness 29-year-old female--03/03/2020 patient was admitted for viral meningitis--was in the hospital for 8 days--seen by Dr. Patterson after discharge..  Had baby January 3rd 2019---patient had a anemia during the hospitalization hematocrit ranged from 24 29 had lab yesterday with a white count of 7.8 which is excellent was running in the 14-99472 range hematocrit 26.5 patient is been on iron--had extensive workup in the hospital including an HEATHER rheumatoid factor Ebstein Ruby all were negative haptoglobin was elevated at 288 sed rate was elevated 104  reticulocyte count was decreased 2.4 iron level decreased at 14 fair--patient does not recall losing any significant amount of blood during the delivery.     ROS:  Skin: no psoriasis, eczema, skin cancer  HEENT: No headache, ocular pain, blurred vision, diplopia, epistaxis, hoarseness change in voice, thyroid trouble  Lung: No pneumonia, asthma, Tb, wheezing, SOB, no smoking  Heart: + chest pain--patient feels probably related to GI symptoms was giving Protonix it seems to have helped but still has some soreness,no ankle edema, palpitations, MI, justyn murmur, hypertension, hyperlipidemia  Abdomen: No nausea,  vomiting, diarrhea, constipation, ulcers, hepatitis, gallbladder disease, melena, hematochezia, hematemesis  : no UTI, renal disease, stones  MS: no fractures, O/A, lupus, rheumatoid, gout  Neuro: No dizziness, LOC, seizures history of viral meningitis  No diabetes, + iron deficiency anemia, no anxiety, no depression   --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    Objective:   Physical Exam:  No physical exam was done this is a telemedicine visit so physical blow as part of my normal physical it was not done  General: Well nourished, well developed, no acute distress  Skin: No lesions  HEENT: Eyes PERRLA, EOM intact, nose patent, throat non-erythematous   NECK: Supple, no bruits, No JVD, no nodes  Lungs: Clear, no rales, rhonchi, wheezing  Heart: Regular rate and rhythm, no murmurs, gallops, or rubs  Abdomen: flat, bowel sounds positive, no tenderness, or organomegaly  MS: Range of motion and muscle strength intact  Neuro: Alert, CN intact, oriented X 3  Extremities: No cyanosis, clubbing, or edema         Assessment:       1. Iron deficiency anemia, unspecified iron deficiency anemia type    2. Viral meningitis    3. Night sweats    4. Elevated sed rate    5. CRP elevated    6. Atypical chest pain    7. Arthralgia of both knees        Plan:       Iron deficiency anemia, unspecified iron deficiency anemia type    Viral meningitis  -     CBC auto differential; Future; Expected date: 04/01/2020  -     Comprehensive metabolic panel; Future; Expected date: 04/01/2020  -     Lipid panel; Future; Expected date: 04/01/2020  -     Iron and TIBC; Future; Expected date: 04/01/2020  -     Reticulocytes; Future; Expected date: 04/01/2020  -     Sedimentation rate; Future; Expected date: 04/01/2020  -     C-reactive protein; Future; Expected date: 04/01/2020  -     EKG 12-lead; Future    Night sweats    Elevated sed rate    CRP elevated    Atypical chest pain    Arthralgia of both knees      history  of recent viral encephalitis--and iron deficiency anemia---patient is having red eyes/night sweats/indigestion with atypical chest pain/decreased hearing in the right ear/myalgia and arthralgia knees ankles and Achilles tendons---review of labs from the hospital show sed rate of 104  elevated haptoglobin elevated 288  Iron deficiency anemia--on ferrous sulfate 324 q.d.---will recheck lab in 2 weeks CBCs CMP lipid iron total iron binding capacity and sed rate CRP reticulocyte count--had stool for blood which was negative--B12 folic acid levels were normal---had HEATHER rheumatoid factor Benigno Bar done all were negative --if anemia if fails to improve may have heme oncologist see patient for possible IV iron or IM iron  If joint problems persist will consider rheumatologist as joint pain is bilateral  Patient advised to get blood in 2 weeks if improving with decreased frequency of blood drawing to 4 weeks then if okay to 6 weeks then 3 months    Atypical chest pain--will redo EKG had EKG in hospital but had sinus tachycardia 125 at the time so will repeat chest x-ray today if patient desires to come in  Health maintenance lipid

## 2020-04-02 ENCOUNTER — CLINICAL SUPPORT (OUTPATIENT)
Dept: PRIMARY CARE CLINIC | Facility: CLINIC | Age: 30
DRG: 315 | End: 2020-04-02
Payer: COMMERCIAL

## 2020-04-02 VITALS
DIASTOLIC BLOOD PRESSURE: 44 MMHG | HEIGHT: 63 IN | BODY MASS INDEX: 20.79 KG/M2 | OXYGEN SATURATION: 99 % | RESPIRATION RATE: 24 BRPM | TEMPERATURE: 98 F | HEART RATE: 130 BPM | WEIGHT: 117.31 LBS | SYSTOLIC BLOOD PRESSURE: 88 MMHG

## 2020-04-02 DIAGNOSIS — D50.9 IRON DEFICIENCY ANEMIA, UNSPECIFIED IRON DEFICIENCY ANEMIA TYPE: Primary | ICD-10-CM

## 2020-04-02 PROCEDURE — 99999 PR PBB SHADOW E&M-EST. PATIENT-LVL III: CPT | Mod: PBBFAC,,,

## 2020-04-02 PROCEDURE — 99999 PR PBB SHADOW E&M-EST. PATIENT-LVL III: ICD-10-PCS | Mod: PBBFAC,,,

## 2020-04-02 NOTE — PLAN OF CARE
Please call extension [phone] upon patient arrival to floor for Hospital Medicine admit team assignment and for additional admit orders for the patient. Do not page the attending, staff physician or Advanced Practice Provider with the patient on arrival (may not be in-house at the time of arrival). Call back or wait to leave beeper number when prompted.     Outside Transfer Acceptance Note        Patients name/MRN:     Melinda Parekh MRN: [id number]     Referring Physician or Mid-Level provider giving report:   Trace Casarez MD     Referral Facility:    [facility] ED     Date/Time of Acceptance:    4/2/20 at 5:35pm     Accepting Physician for admission to hospital: Griffin Corey MD ()     Accepting facility:    [facility]     Consulting Physicians from [facility] involved in case:   Sean Bentley MD    Reason for transfer request:    Cardiology services    Per ED note, Ms. Parekh is a 29-year-old woman with history of viral meningitis presents to the emergency department reporting intermittent non-radiating left-sided chest pain with shortness of breath and nausea over the past week.  Patient reports chest pain and difficulty breathing significantly increased suddenly after eating a steak last night.  Patient reports symptoms continued to worsen throughout the night, so she presented to her primary care provider today and was instructed to proceed directly to the emergency department.  Patient denies any fever, chills, cough, body aches, recent injury or trauma, vomiting, diarrhea, or other associated symptoms.  Patient denies taking any medications or performing any treatments for symptoms.  Patient reports she is approximately 11 weeks postpartum from a spontaneous vaginal delivery.  Patient also reports she was discharged from [facility] approximately 3 weeks ago after an approximately 8 day admission for viral meningitis.  Patient denies any tobacco  "use.  Patient reports she is on oral contraceptives currently; however, patient reports she cannot recall the name of it at this time.  Patient denies any recent surgery or prolonged sitting; however, patient reports she has been resting frequently since discharge from the hospital.  Patient denies any known close contact exposure to anyone who has tested positive for coronavirus or who was suspected with having Coronavirus.  Patient denies any recent travel within or outside the [place] in the past 30 days.    In the ED she has been afebrile with heart rate into the 120s and respiratory rate in the low 20s.  She is satting 100% on room air.  Blood pressures have remained in the 110s/70s.  No increased JVP.  Initial lab workup revealed WBC 18.9, hemoglobin 9 parentheses baseline), D-dimer 2.17, creatinine 0.7, BNP 99, negative troponin.  Lactic acid 1.4.  UA positive for nitrites with 3 WBC.  CTA of the chest negative for PE but does reveal moderate pericardial effusion without evidence for cardiac mass.  Maximum thickness is 22 mm.  She was given 1 L normal saline bolus on admission. Referring physician believes patient is at low risk for COVID.    To Do List upon arrival:    1) Consult Cardiology  2) Order TTE  3) Primary care physician requesting ID consult    Vital signs:   /71   Pulse (!) 116   Temp 98.8 °F (37.1 °C) (Oral)   Resp (!) 27   Ht 5' 3" (1.6 m)   Wt 53.1 kg (117 lb)   SpO2 100%   BMI 20.73 kg/m²     Past Medical History:   Diagnosis Date    H/O elbow surgery     Oral contraceptive use      Past Surgical History:   Procedure Laterality Date    left elbow Left 03/30/2017    torn ligament       Allergies:  Review of patient's allergies indicates:   Allergen Reactions    No known drug allergies        Current Facility-Administered Medications:   Current Outpatient Medications:     azithromycin (Z-JEFF) 250 MG tablet, Take 1 tablet by mouth once daily., Disp: , Rfl:     " butalbital-acetaminophen-caffeine -40 mg (FIORICET, ESGIC) -40 mg per tablet, Take 1 tablet by mouth every 6 (six) hours as needed for Headaches., Disp: 20 tablet, Rfl: 0    ferrous sulfate (FEOSOL) 325 mg (65 mg iron) Tab tablet, Take 1 tablet (325 mg total) by mouth 2 (two) times daily., Disp: , Rfl:     norethindrone-ethinyl estradiol-iron (ESTROSTEP FE) 1-20(5)/1-30(7) /1mg-35mcg (9) Tab, Take by mouth once daily., Disp: , Rfl:     pantoprazole (PROTONIX) 40 MG tablet, Take 1 tablet (40 mg total) by mouth once daily., Disp: 30 tablet, Rfl: 0    LABS:  see Epic    Imaging:  see Marva Corey MD  Department of Hospital Medicine  Patient Flow Center/   [phone]

## 2020-04-02 NOTE — PROGRESS NOTES
Patient on Nurse schedule, states here for an EKG. Reading obtained, MD notified of report, pt sent to ER for c/o chest pain and abnormal vs. Escorted to ED by SAMY Negrete RN

## 2020-04-03 ENCOUNTER — HOSPITAL ENCOUNTER (INPATIENT)
Facility: HOSPITAL | Age: 30
LOS: 1 days | Discharge: HOME | DRG: 315 | End: 2020-04-04
Attending: HOSPITALIST | Admitting: HOSPITALIST
Payer: COMMERCIAL

## 2020-04-03 DIAGNOSIS — I31.39 PERICARDIAL EFFUSION: ICD-10-CM

## 2020-04-03 DIAGNOSIS — R07.9 CHEST PAIN: ICD-10-CM

## 2020-04-03 LAB
ASCENDING AORTA: 2.33 CM
AV INDEX (PROSTH): 0.76
AV MEAN GRADIENT: 7 MMHG
AV PEAK GRADIENT: 11 MMHG
AV VALVE AREA: 2.1 CM2
AV VELOCITY RATIO: 0.87
BSA FOR ECHO PROCEDURE: 1.55 M2
CK SERPL-CCNC: 8 U/L (ref 20–180)
CRP SERPL-MCNC: 221.7 MG/L (ref 0–8.2)
CV ECHO LV RWT: 0.33 CM
DOP CALC AO PEAK VEL: 1.67 M/S
DOP CALC AO VTI: 26.14 CM
DOP CALC LVOT AREA: 2.7 CM2
DOP CALC LVOT DIAMETER: 1.87 CM
DOP CALC LVOT PEAK VEL: 1.46 M/S
DOP CALC LVOT STROKE VOLUME: 54.76 CM3
DOP CALCLVOT PEAK VEL VTI: 19.95 CM
E WAVE DECELERATION TIME: 113.29 MSEC
E/A RATIO: 0.87
E/E' RATIO: 7.73 M/S
ECHO LV POSTERIOR WALL: 0.71 CM (ref 0.6–1.1)
FERRITIN SERPL-MCNC: 220 NG/ML (ref 20–300)
FRACTIONAL SHORTENING: 34 % (ref 28–44)
INTERVENTRICULAR SEPTUM: 0.52 CM (ref 0.6–1.1)
IVRT: 66.6 MSEC
LA MAJOR: 5.07 CM
LA MINOR: 4.97 CM
LA WIDTH: 3.59 CM
LDH SERPL L TO P-CCNC: 224 U/L (ref 110–260)
LEFT ATRIUM SIZE: 2.92 CM
LEFT ATRIUM VOLUME INDEX: 28.8 ML/M2
LEFT ATRIUM VOLUME: 44.73 CM3
LEFT INTERNAL DIMENSION IN SYSTOLE: 2.84 CM (ref 2.1–4)
LEFT VENTRICLE DIASTOLIC VOLUME INDEX: 53.78 ML/M2
LEFT VENTRICLE DIASTOLIC VOLUME: 83.4 ML
LEFT VENTRICLE MASS INDEX: 49 G/M2
LEFT VENTRICLE SYSTOLIC VOLUME INDEX: 19.7 ML/M2
LEFT VENTRICLE SYSTOLIC VOLUME: 30.59 ML
LEFT VENTRICULAR INTERNAL DIMENSION IN DIASTOLE: 4.31 CM (ref 3.5–6)
LEFT VENTRICULAR MASS: 75.45 G
LV LATERAL E/E' RATIO: 10.63 M/S
LV SEPTAL E/E' RATIO: 6.07 M/S
MV PEAK A VEL: 0.98 M/S
MV PEAK E VEL: 0.85 M/S
PISA TR MAX VEL: 2.29 M/S
PROCALCITONIN SERPL IA-MCNC: 0.09 NG/ML
PULM VEIN S/D RATIO: 1.34
PV PEAK D VEL: 0.47 M/S
PV PEAK S VEL: 0.63 M/S
RA MAJOR: 4.85 CM
RA PRESSURE: 15 MMHG
RA WIDTH: 3.75 CM
RV TISSUE DOPPLER FREE WALL SYSTOLIC VELOCITY 1 (APICAL 4 CHAMBER VIEW): 13.14 CM/S
SINUS: 2.52 CM
STJ: 2.26 CM
TDI LATERAL: 0.08 M/S
TDI SEPTAL: 0.14 M/S
TDI: 0.11 M/S
TR MAX PG: 21 MMHG
TRICUSPID ANNULAR PLANE SYSTOLIC EXCURSION: 1.38 CM
TV REST PULMONARY ARTERY PRESSURE: 36 MMHG

## 2020-04-03 PROCEDURE — 63600175 PHARM REV CODE 636 W HCPCS: Performed by: STUDENT IN AN ORGANIZED HEALTH CARE EDUCATION/TRAINING PROGRAM

## 2020-04-03 PROCEDURE — 36415 COLL VENOUS BLD VENIPUNCTURE: CPT

## 2020-04-03 PROCEDURE — 84145 PROCALCITONIN (PCT): CPT

## 2020-04-03 PROCEDURE — 82728 ASSAY OF FERRITIN: CPT

## 2020-04-03 PROCEDURE — 25000003 PHARM REV CODE 250: Performed by: STUDENT IN AN ORGANIZED HEALTH CARE EDUCATION/TRAINING PROGRAM

## 2020-04-03 PROCEDURE — 20600001 HC STEP DOWN PRIVATE ROOM

## 2020-04-03 PROCEDURE — 86140 C-REACTIVE PROTEIN: CPT

## 2020-04-03 PROCEDURE — 63600175 PHARM REV CODE 636 W HCPCS: Performed by: HOSPITALIST

## 2020-04-03 PROCEDURE — 99233 PR SUBSEQUENT HOSPITAL CARE,LEVL III: ICD-10-PCS | Mod: ,,, | Performed by: INTERNAL MEDICINE

## 2020-04-03 PROCEDURE — 99233 SBSQ HOSP IP/OBS HIGH 50: CPT | Mod: ,,, | Performed by: INTERNAL MEDICINE

## 2020-04-03 PROCEDURE — 25000003 PHARM REV CODE 250: Performed by: HOSPITALIST

## 2020-04-03 PROCEDURE — 99253 PR INITIAL INPATIENT CONSULT,LEVL III: ICD-10-PCS | Mod: ,,, | Performed by: INTERNAL MEDICINE

## 2020-04-03 PROCEDURE — 82550 ASSAY OF CK (CPK): CPT

## 2020-04-03 PROCEDURE — 99253 IP/OBS CNSLTJ NEW/EST LOW 45: CPT | Mod: ,,, | Performed by: INTERNAL MEDICINE

## 2020-04-03 PROCEDURE — 99223 PR INITIAL HOSPITAL CARE,LEVL III: ICD-10-PCS | Mod: ,,, | Performed by: HOSPITALIST

## 2020-04-03 PROCEDURE — 99223 1ST HOSP IP/OBS HIGH 75: CPT | Mod: ,,, | Performed by: HOSPITALIST

## 2020-04-03 PROCEDURE — 83615 LACTATE (LD) (LDH) ENZYME: CPT

## 2020-04-03 RX ORDER — COLCHICINE 0.6 MG/1
0.6 TABLET, FILM COATED ORAL DAILY
Status: DISCONTINUED | OUTPATIENT
Start: 2020-04-04 | End: 2020-04-04 | Stop reason: HOSPADM

## 2020-04-03 RX ORDER — CEFTRIAXONE 1 G/1
1 INJECTION, POWDER, FOR SOLUTION INTRAMUSCULAR; INTRAVENOUS
Status: DISCONTINUED | OUTPATIENT
Start: 2020-04-03 | End: 2020-04-04 | Stop reason: HOSPADM

## 2020-04-03 RX ORDER — IBUPROFEN 200 MG
24 TABLET ORAL
Status: DISCONTINUED | OUTPATIENT
Start: 2020-04-03 | End: 2020-04-04 | Stop reason: HOSPADM

## 2020-04-03 RX ORDER — LIDOCAINE HYDROCHLORIDE 10 MG/ML
2.1 INJECTION INFILTRATION; PERINEURAL EVERY 24 HOURS
Status: DISCONTINUED | OUTPATIENT
Start: 2020-04-03 | End: 2020-04-04 | Stop reason: HOSPADM

## 2020-04-03 RX ORDER — SODIUM CHLORIDE 0.9 % (FLUSH) 0.9 %
10 SYRINGE (ML) INJECTION
Status: DISCONTINUED | OUTPATIENT
Start: 2020-04-03 | End: 2020-04-04 | Stop reason: HOSPADM

## 2020-04-03 RX ORDER — TRAMADOL HYDROCHLORIDE 50 MG/1
50 TABLET ORAL EVERY 6 HOURS PRN
Status: DISCONTINUED | OUTPATIENT
Start: 2020-04-03 | End: 2020-04-04 | Stop reason: HOSPADM

## 2020-04-03 RX ORDER — SODIUM CHLORIDE 9 MG/ML
INJECTION, SOLUTION INTRAVENOUS CONTINUOUS
Status: DISCONTINUED | OUTPATIENT
Start: 2020-04-03 | End: 2020-04-04 | Stop reason: HOSPADM

## 2020-04-03 RX ORDER — PANTOPRAZOLE SODIUM 40 MG/1
40 TABLET, DELAYED RELEASE ORAL DAILY
Status: DISCONTINUED | OUTPATIENT
Start: 2020-04-03 | End: 2020-04-04 | Stop reason: HOSPADM

## 2020-04-03 RX ORDER — CEFTRIAXONE 1 G/1
1 INJECTION, POWDER, FOR SOLUTION INTRAMUSCULAR; INTRAVENOUS
Status: DISCONTINUED | OUTPATIENT
Start: 2020-04-03 | End: 2020-04-03

## 2020-04-03 RX ORDER — IBUPROFEN 600 MG/1
600 TABLET ORAL 3 TIMES DAILY
Status: DISCONTINUED | OUTPATIENT
Start: 2020-04-03 | End: 2020-04-04 | Stop reason: HOSPADM

## 2020-04-03 RX ORDER — GLUCAGON 1 MG
1 KIT INJECTION
Status: DISCONTINUED | OUTPATIENT
Start: 2020-04-03 | End: 2020-04-04 | Stop reason: HOSPADM

## 2020-04-03 RX ORDER — IBUPROFEN 200 MG
16 TABLET ORAL
Status: DISCONTINUED | OUTPATIENT
Start: 2020-04-03 | End: 2020-04-04 | Stop reason: HOSPADM

## 2020-04-03 RX ORDER — COLCHICINE 0.6 MG/1
1.2 TABLET, FILM COATED ORAL 2 TIMES DAILY
Status: COMPLETED | OUTPATIENT
Start: 2020-04-03 | End: 2020-04-03

## 2020-04-03 RX ADMIN — TRAMADOL HYDROCHLORIDE 50 MG: 50 TABLET, FILM COATED ORAL at 01:04

## 2020-04-03 RX ADMIN — IBUPROFEN 600 MG: 600 TABLET, FILM COATED ORAL at 09:04

## 2020-04-03 RX ADMIN — PANTOPRAZOLE SODIUM 40 MG: 40 TABLET, DELAYED RELEASE ORAL at 09:04

## 2020-04-03 RX ADMIN — SODIUM CHLORIDE: 0.9 INJECTION, SOLUTION INTRAVENOUS at 01:04

## 2020-04-03 RX ADMIN — COLCHICINE 1.2 MG: 0.6 TABLET, FILM COATED ORAL at 09:04

## 2020-04-03 RX ADMIN — IBUPROFEN 600 MG: 600 TABLET, FILM COATED ORAL at 02:04

## 2020-04-03 RX ADMIN — SODIUM CHLORIDE: 0.9 INJECTION, SOLUTION INTRAVENOUS at 04:04

## 2020-04-03 RX ADMIN — COLCHICINE 1.2 MG: 0.6 TABLET, FILM COATED ORAL at 08:04

## 2020-04-03 NOTE — PLAN OF CARE
Plan of care discussed with patient. Patient is free of fall/trauma/injury, fall precautions in place. Continuous infusion of Normal saline running at 125 ml/hr. Denies CP, SOB, or discomfort. All questions addressed. Will continue to monitor

## 2020-04-03 NOTE — ASSESSMENT & PLAN NOTE
Of note patient is postpartum from 11 weeks. Patient also reports she was discharged from [facility] approximately 3 weeks ago after and approximately 8 day admission for possible viral meningitis but no distinct cause found. Now patient now has pericarditis and a pericardial effusion.     Plan  Consider consult to ID.   Possible to link vague meningitis symptoms to pericarditis?

## 2020-04-03 NOTE — PLAN OF CARE
04/03/20 0949   Discharge Assessment   Assessment Type Discharge Planning Assessment   Confirmed/corrected address and phone number on facesheet? Yes   Assessment information obtained from? Patient   Expected Length of Stay (days) 4   Communicated expected length of stay with patient/caregiver yes   Prior to hospitilization cognitive status: Alert/Oriented   Prior to hospitalization functional status: Independent   Current cognitive status: Alert/Oriented   Current Functional Status: Independent   Facility Arrived From: [facility]   Lives With spouse;child(luciano), dependent   Able to Return to Prior Arrangements yes   Is patient able to care for self after discharge? Yes   Patient's perception of discharge disposition home or selfcare   Readmission Within the Last 30 Days current reason for admission unrelated to previous admission   Patient currently being followed by outpatient case management? No   Patient currently receives any other outside agency services? No   Equipment Currently Used at Home none   Is the patient taking medications as prescribed? yes   Does the patient have transportation home? Yes   Transportation Anticipated family or friend will provide   Does the patient receive services at the [facility]mad[facility] Clinic? No   Discharge Plan A Home   Discharge Plan B Home   DME Needed Upon Discharge  none   Patient/Family in Agreement with Plan yes   Readmission Questionnaire   At the time of your discharge, did someone talk to you about what your health problems were? Yes   At the time of discharge, did someone talk to you about what to watch out for regarding worsening of your health problem? Yes   At the time of discharge, did someone talk to you about what to do if you experienced worsening of your health problem? Yes   At the time of discharge, did someone talk to you about which medication to take when you left the hospital and which ones to stop taking? Yes   At the time of discharge, did someone  talk to you about when and where to follow up with a doctor after you left the hospital? Yes   How often do you need to have someone help you when you read instructions, pamphlets, or other written material from your doctor or pharmacy? Always   Does the patient have transportation to healthcare appointments? Yes   Living Arrangements house   Does the patient have family/friends to help with healtcare needs after discharge? yes     CM completed d/c plan over phone with patient. Pt lives at home with her  and children. She is 11 weeks post partum. Pt does not use any equipment at home and is independent. She has no questions at this time. Family can bring her home.    Savage Patterson MD  [phone] (p)  [phone] (f)      [id number] - [place]  [address] AT [facility] [address]  [address] [address]  Phone: [phone] Fax: [phone]      Payor: BLUE CR[employer] BLUE [employer]IELD / Plan: [facility] FEDERAL / Product Type: PPO /     Extended Emergency Contact Information  Primary Emergency Contact: Caprice Felix  Mobile Phone: [phone]  Relation: Sister  Preferred language: English   needed? No  Secondary Emergency Contact: KEVIN LAURENT  Mobile Phone: [phone]  Relation: Spouse  Preferred language: English   needed? No  Mother: Shasta Sexton   [place]  Mobile Phone: [phone]    CM will continue to assist with d/c planning.    Julie Haase RN  Case Management [phone]

## 2020-04-03 NOTE — PLAN OF CARE
NPO after midnight for possible CTS workup. Remains on RA. Complaints of pain treated with PRN medications. VSS. Pt denies SOB or chest pain. Pt remain on telemetry; ST on telemetry. Fall precautions maintained. Pt free of falls and injuries. POC discussed with patient/family, verbalized understanding. Questions answered, no distress at present.

## 2020-04-03 NOTE — HPI
29-year-old female presented to the emergency department at [facility] reporting intermittent nonradiating left-sided chest pain with shortness of breath and nausea over the past week.  Patient reports chest pain and difficulty breathing significantly increased suddenly after eating a steak last night. Described as pressure-like and sharp without radiation. It is worsened by certain positions, deep breathing and eating. Primary symptoms include shortness of breath, palpitations and nausea .Patient denies fever, fatigue, syncope, cough, wheezing,  abdominal pain, no vomiting, no dizziness and no altered mental status  Patient reports symptoms continued to worsen throughout the night, so she presented to her primary care provider today and was instructed to proceed directly to the emergency department.     Of note patient is postpartum from 11 weeks. Patient also reports she was discharged from [facility] approximately 3 weeks ago after and approximately 8 day admission for possible viral meningitis but no distinct cause found.     In the ED she was afebrile with heart rate into the 120s and respiratory rate in the low 20s.  She was satting 100% on room air.  Blood pressures have remained in the 110s/70s.  No increased JVP.  Initial lab workup revealed WBC 18.9, hemoglobin 9, D-dimer 2.17, creatinine 0.7, BNP 99, negative troponin.  Lactic acid 1.4.  UA positive for nitrites with 3 WBC.  CTA of the chest negative for PE but does reveal moderate pericardial effusion without evidence for cardiac mass.  Maximum thickness is 22 mm.  She was given 1 L normal saline bolus on admission. She was then transferred to [facility] for cardiac evaluation for pericardial effusion.

## 2020-04-03 NOTE — ASSESSMENT & PLAN NOTE
"28 yo F with recent history of viral meningitis 3 weeks ago who presented to OSH w/ 1 week history of shortness of breath and pleuritic chest pain that worsened overnight s/p transfer to [facility]. She describes the chest pain as sharp (10/10 pain) and states "it feels like someone is squeezing my heart." Improved w/ leaning forward and worsened w/ leaning on back. In ED was noted to be tachycardic. CTA was negative for PE but demonstrated moderate pericardial effusion. She was started on NSAIDs and colchicine. Additionally, cardiology has been consulted. ID is consulted for evaluation of possible viral pericarditis given prior hx of meningitis.    Recommendations  Viruses (Coxsackievirus, and echovirus) are one of the most common etiologies of pericarditis, other etiologies would include uremia (not present), autoimmune (SLE (HEATHER negative), infectious (fungal and bacterial although less likely, TB), hypothyroidism, drug induced, neoplasm  Given prior history of viral meningitis viral etiology is certainly possible, however there would not be anti-viral treatment  F/u COVID-19 PCR  F/u RIP   Would discuss with patient and check HIV as well  F/u cardiology recommendations for treatment  "

## 2020-04-03 NOTE — HPI
"28 yo F with recent history of viral meningitis 3 weeks ago who presented to OSH w/ 1 week history of shortness of breath and pleuritic chest pain that worsened overnight s/p transfer to [facility]. She describes the chest pain as sharp (10/10 pain) and states "it feels like someone is squeezing my heart." Improved w/ leaning forward and worsened w/ leaning on back. In ED was noted to be tachycardic. CTA was negative for PE but demonstrated moderate pericardial effusion. She was started on NSAIDs and colchicine. Additionally, cardiology has been consulted.  Of note she was recently hospitalized for viral meningitis when she presented w/ headache, nausea, diarrhea w/ LP remarkable for  (68% segs, 4% lymph), elevated protein 174, low glucose 39. HSV PCR negative and cultures negative. She received 8 days of Rocephin during work-up. Following discharged from that admission she continued to have fever x 1 week which has since resolved, however, she continues to have daily night sweats. Prior HEATHER, RF, anti-CCP negative.  She denies any fevers, chills, nausea, vomiting, cough, wheezing, abdominal pain.   No sick contacts. 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  ID is consulted for evaluation of pericardial effusion given prior hx of meningitis.  "

## 2020-04-03 NOTE — SUBJECTIVE & OBJECTIVE
Past Medical History:   Diagnosis Date    H/O elbow surgery     Oral contraceptive use        Past Surgical History:   Procedure Laterality Date    left elbow Left 03/30/2017    torn ligament       Review of patient's allergies indicates:   Allergen Reactions    No known drug allergies        Current Facility-Administered Medications on File Prior to Encounter   Medication    [COMPLETED] iohexoL (OMNIPAQUE 350) injection 100 mL    [COMPLETED] ketorolac injection 30 mg    [COMPLETED] ketorolac injection 30 mg    [COMPLETED] ondansetron injection 4 mg    [COMPLETED] sodium chloride 0.9% bolus 1,000 mL    [DISCONTINUED] acetaminophen tablet 1,000 mg    [DISCONTINUED] cefTRIAXone injection 1,000 mg     Current Outpatient Medications on File Prior to Encounter   Medication Sig    ferrous sulfate (FEOSOL) 325 mg (65 mg iron) Tab tablet Take 1 tablet (325 mg total) by mouth 2 (two) times daily.    norethindrone-ethinyl estradiol-iron (ESTROSTEP FE) 1-20(5)/1-30(7) /1mg-35mcg (9) Tab Take by mouth once daily.    pantoprazole (PROTONIX) 40 MG tablet Take 1 tablet (40 mg total) by mouth once daily.    azithromycin (Z-JEFF) 250 MG tablet Take 1 tablet by mouth once daily.    butalbital-acetaminophen-caffeine -40 mg (FIORICET, ESGIC) -40 mg per tablet Take 1 tablet by mouth every 6 (six) hours as needed for Headaches.     Family History     Problem Relation (Age of Onset)    Breast cancer Other    Heart attack Maternal Grandfather    No Known Problems Maternal Grandmother, Sister, Paternal Grandmother, Paternal Grandfather    Skin cancer Other        Tobacco Use    Smoking status: Never Smoker    Smokeless tobacco: Never Used   Substance and Sexual Activity    Alcohol use: Yes    Drug use: No    Sexual activity: Yes     Partners: Male     Review of Systems   Constitutional: Negative for fatigue and fever.   HENT: Negative for congestion and sinus pain.    Respiratory: Positive for shortness of  breath. Negative for cough and wheezing.    Cardiovascular: Positive for chest pain and palpitations. Negative for leg swelling.   Gastrointestinal: Negative for abdominal pain, nausea and vomiting.   Genitourinary: Negative for dysuria and flank pain.   Musculoskeletal: Negative for arthralgias and myalgias.   Neurological: Negative for light-headedness and headaches.   Psychiatric/Behavioral: Negative for agitation and confusion.     Objective:     Vital Signs (Most Recent):  Temp: 99.7 °F (37.6 °C) (04/02/20 2333)  Pulse: (!) 120 (04/02/20 2333)  Resp: (!) 22 (04/02/20 2333)  BP: 111/64 (04/02/20 2333)  SpO2: 99 % (04/02/20 2333) Vital Signs (24h Range):  Temp:  [97.5 °F (36.4 °C)-99.8 °F (37.7 °C)] 99.7 °F (37.6 °C)  Pulse:  [116-130] 120  Resp:  [16-33] 22  SpO2:  [98 %-100 %] 99 %  BP: ()/(44-78) 111/64     Weight: 54.1 kg (119 lb 4.3 oz)  Body mass index is 21.13 kg/m².    Physical Exam   Constitutional: She is oriented to person, place, and time. She appears well-developed and well-nourished. No distress.   HENT:   Head: Normocephalic and atraumatic.   Eyes: EOM are normal.   Neck: Normal range of motion. No JVD present. No thyromegaly present.   Cardiovascular: Regular rhythm, normal heart sounds and intact distal pulses. Exam reveals no friction rub.   No murmur heard.  No pericardial rub. Tachycardic   Pulmonary/Chest: Effort normal and breath sounds normal. No respiratory distress. She has no wheezes.   Abdominal: Soft. Bowel sounds are normal. She exhibits no distension. There is no tenderness.   Musculoskeletal: Normal range of motion. She exhibits no edema.   Neurological: She is alert and oriented to person, place, and time.   Skin: Skin is warm and dry.   Nursing note and vitals reviewed.       Significant Labs:   CMP:   Recent Labs   Lab 04/02/20  1425   *   K 3.8   CL 99*   CO2 25   *   BUN 11   CREATININE 0.7   CALCIUM 8.9   PROT 7.4   ALBUMIN 3.2*   BILITOT 0.9   ALKPHOS 102    AST 12*   ALT 13*   ANIONGAP 11   EGFRNONAA >60.0       Significant Imaging: I have reviewed and interpreted all pertinent imaging results/findings within the past 24 hours.

## 2020-04-03 NOTE — SUBJECTIVE & OBJECTIVE
Past Medical History:   Diagnosis Date    H/O elbow surgery     Oral contraceptive use        Past Surgical History:   Procedure Laterality Date    left elbow Left 03/30/2017    torn ligament       Review of patient's allergies indicates:   Allergen Reactions    No known drug allergies        Medications:  Medications Prior to Admission   Medication Sig    ferrous sulfate (FEOSOL) 325 mg (65 mg iron) Tab tablet Take 1 tablet (325 mg total) by mouth 2 (two) times daily.    norethindrone-ethinyl estradiol-iron (ESTROSTEP FE) 1-20(5)/1-30(7) /1mg-35mcg (9) Tab Take by mouth once daily.    pantoprazole (PROTONIX) 40 MG tablet Take 1 tablet (40 mg total) by mouth once daily.    azithromycin (Z-JEFF) 250 MG tablet Take 1 tablet by mouth once daily.    butalbital-acetaminophen-caffeine -40 mg (FIORICET, ESGIC) -40 mg per tablet Take 1 tablet by mouth every 6 (six) hours as needed for Headaches.     Antibiotics (From admission, onward)    Start     Stop Route Frequency Ordered    04/03/20 2015  cefTRIAXone injection 1 g      -- IV Every 24 hours (non-standard times) 04/03/20 0650        Antifungals (From admission, onward)    None        Antivirals (From admission, onward)    None           Immunization History   Administered Date(s) Administered    DTaP 1988, 02/17/1989, 04/28/1989, 06/08/1990, 02/12/1993    Hepatitis B, Pediatric/Adolescent 07/14/1999, 02/25/2003, 06/13/2007    IPV 01/18/1989, 03/17/1989, 02/12/1993, 08/18/1995    Influenza - Quadrivalent - PF (6 months and older) 09/25/2019    MMR 02/02/1990, 02/12/1993, 03/05/2015    Measles 02/01/1993    Meningococcal Conjugate (MCV4P) 06/01/2007, 06/13/2007    Mumps 02/01/1990    Rubella 02/01/1990    Td (ADULT) 02/01/2003, 02/25/2003    Tdap 03/20/2014, 11/07/2019       Family History     Problem Relation (Age of Onset)    Breast cancer Other    Heart attack Maternal Grandfather    No Known Problems Maternal Grandmother, Sister,  Paternal Grandmother, Paternal Grandfather    Skin cancer Other        Social History     Socioeconomic History    Marital status:      Spouse name: Not on file    Number of children: Not on file    Years of education: Not on file    Highest education level: Not on file   Occupational History     Employer: [employer]   Social Needs    Financial resource strain: Not on file    Food insecurity:     Worry: Not on file     Inability: Not on file    Transportation needs:     Medical: Not on file     Non-medical: Not on file   Tobacco Use    Smoking status: Never Smoker    Smokeless tobacco: Never Used   Substance and Sexual Activity    Alcohol use: Yes    Drug use: No    Sexual activity: Yes     Partners: Male   Lifestyle    Physical activity:     Days per week: Not on file     Minutes per session: Not on file    Stress: Not on file   Relationships    Social connections:     Talks on phone: Not on file     Gets together: Not on file     Attends Jew service: Not on file     Active member of club or organization: Not on file     Attends meetings of clubs or organizations: Not on file     Relationship status: Not on file   Other Topics Concern    Are you pregnant or think you may be? Not Asked    Breast-feeding Not Asked   Social History Narrative    Not on file     Review of Systems   Constitutional: Negative for fever.        Positive for night sweats   Respiratory: Positive for shortness of breath. Negative for cough.    Cardiovascular: Positive for chest pain. Negative for leg swelling.   Gastrointestinal: Negative for abdominal pain, diarrhea, nausea and vomiting.   Musculoskeletal: Negative for arthralgias and myalgias.   Skin: Negative for rash.   Psychiatric/Behavioral: Negative for agitation and confusion.     Objective:     Vital Signs (Most Recent):  Temp: 98.6 °F (37 °C) (04/03/20 1653)  Pulse: (!) 111 (04/03/20 1653)  Resp: 16 (04/03/20 1653)  BP: (!) 98/54 (04/03/20  1653)  SpO2: 97 % (04/03/20 1653) Vital Signs (24h Range):  Temp:  [98.4 °F (36.9 °C)-99.8 °F (37.7 °C)] 98.6 °F (37 °C)  Pulse:  [101-126] 111  Resp:  [16-33] 16  SpO2:  [97 %-100 %] 97 %  BP: ()/(54-78) 98/54     Weight: 54 kg (119 lb)  Body mass index is 21.08 kg/m².    Estimated Creatinine Clearance: 96.3 mL/min (based on SCr of 0.7 mg/dL).    Physical Exam   Constitutional: She is oriented to person, place, and time. She appears well-developed and well-nourished. No distress.   HENT:   Head: Normocephalic and atraumatic.   Eyes: Conjunctivae are normal. Right eye exhibits no discharge. Left eye exhibits no discharge. No scleral icterus.   Neck: Normal range of motion. Neck supple. No thyromegaly present.   Cardiovascular: Regular rhythm.   No pericardial rub. Tachycardic   Pulmonary/Chest: Effort normal. No respiratory distress.   Abdominal: Soft. Bowel sounds are normal. She exhibits no distension. There is no tenderness.   Musculoskeletal: She exhibits no edema.   Neurological: She is alert and oriented to person, place, and time.   Skin: Skin is warm and dry. No rash noted. No erythema.   Psychiatric: She has a normal mood and affect. Her behavior is normal.   Nursing note and vitals reviewed.      Significant Labs:   Blood Culture:   Recent Labs   Lab 03/03/20  1204 03/03/20  1330 03/06/20  0638   LABBLOO No growth after 5 days. No growth after 5 days. No growth after 5 days.     CBC:   Recent Labs   Lab 04/02/20  1425   WBC 18.90*   HGB 9.0*   HCT 27.1*   *     CMP:   Recent Labs   Lab 04/02/20  1425   *   K 3.8   CL 99*   CO2 25   *   BUN 11   CREATININE 0.7   CALCIUM 8.9   PROT 7.4   ALBUMIN 3.2*   BILITOT 0.9   ALKPHOS 102   AST 12*   ALT 13*   ANIONGAP 11   EGFRNONAA >60.0     Microbiology Results (last 7 days)     ** No results found for the last 168 hours. **          Significant Imaging: I have reviewed all pertinent imaging results/findings within the past 24 hours.

## 2020-04-03 NOTE — H&P
[facility] Hospital Medicine  History & Physical    Patient Name: Melinda Parekh  MRN: [id number]  Admission Date: 4/3/2020  Attending Physician: Chapis Thakur MD   Primary Care Provider: Savage Patterson MD    [facility] Medicine Team: Networked reference to record PCT  Grayson Johnson MD     Patient information was obtained from patient, past medical records and ER records.     Subjective:     Principal Problem:Pericardial effusion    Chief Complaint: No chief complaint on file.       HPI: 29-year-old female presented to the emergency department at [facility] reporting intermittent nonradiating left-sided chest pain with shortness of breath and nausea over the past week.  Patient reports chest pain and difficulty breathing significantly increased suddenly after eating a steak last night. Described as pressure-like and sharp without radiation. It is worsened by certain positions, deep breathing and eating. Primary symptoms include shortness of breath, palpitations and nausea .Patient denies fever, fatigue, syncope, cough, wheezing,  abdominal pain, no vomiting, no dizziness and no altered mental status  Patient reports symptoms continued to worsen throughout the night, so she presented to her primary care provider today and was instructed to proceed directly to the emergency department.     Of note patient is postpartum from 11 weeks. Patient also reports she was discharged from [facility] approximately 3 weeks ago after and approximately 8 day admission for possible viral meningitis but no distinct cause found.     In the ED she was afebrile with heart rate into the 120s and respiratory rate in the low 20s.  She  was satting 100% on room air.  Blood pressures have remained in the 110s/70s.  No increased JVP.  Initial lab workup revealed WBC 18.9, hemoglobin 9, D-dimer 2.17, creatinine 0.7, BNP 99, negative troponin.  Lactic acid 1.4.  UA positive for nitrites with 3  WBC.  CTA of the chest negative for PE but does reveal moderate pericardial effusion without evidence for cardiac mass.  Maximum thickness is 22 mm.  She was given 1 L normal saline bolus on admission. She was then transferred to [facility] for cardiac evaluation for pericardial effusion.       Past Medical History:   Diagnosis Date    H/O elbow surgery     Oral contraceptive use        Past Surgical History:   Procedure Laterality Date    left elbow Left 03/30/2017    torn ligament       Review of patient's allergies indicates:   Allergen Reactions    No known drug allergies        Current Facility-Administered Medications on File Prior to Encounter   Medication    [COMPLETED] iohexoL (OMNIPAQUE 350) injection 100 mL    [COMPLETED] ketorolac injection 30 mg    [COMPLETED] ketorolac injection 30 mg    [COMPLETED] ondansetron injection 4 mg    [COMPLETED] sodium chloride 0.9% bolus 1,000 mL    [DISCONTINUED] acetaminophen tablet 1,000 mg    [DISCONTINUED] cefTRIAXone injection 1,000 mg     Current Outpatient Medications on File Prior to Encounter   Medication Sig    ferrous sulfate (FEOSOL) 325 mg (65 mg iron) Tab tablet Take 1 tablet (325 mg total) by mouth 2 (two) times daily.    norethindrone-ethinyl estradiol-iron (ESTROSTEP FE) 1-20(5)/1-30(7) /1mg-35mcg (9) Tab Take by mouth once daily.    pantoprazole (PROTONIX) 40 MG tablet Take 1 tablet (40 mg total) by mouth once daily.    azithromycin (Z-JEFF) 250 MG tablet Take 1 tablet by mouth once daily.    butalbital-acetaminophen-caffeine -40 mg (FIORICET, ESGIC) -40 mg per tablet Take 1 tablet by mouth every 6 (six) hours as needed for Headaches.     Family History     Problem Relation (Age of Onset)    Breast cancer Other    Heart attack Maternal Grandfather    No Known Problems Maternal Grandmother, Sister, Paternal Grandmother, Paternal Grandfather    Skin cancer Other        Tobacco Use    Smoking status: Never Smoker    Smokeless  tobacco: Never Used   Substance and Sexual Activity    Alcohol use: Yes    Drug use: No    Sexual activity: Yes     Partners: Male     Review of Systems   Constitutional: Negative for fatigue and fever.   HENT: Negative for congestion and sinus pain.    Respiratory: Positive for shortness of breath. Negative for cough and wheezing.    Cardiovascular: Positive for chest pain and palpitations. Negative for leg swelling.   Gastrointestinal: Negative for abdominal pain, nausea and vomiting.   Genitourinary: Negative for dysuria and flank pain.   Musculoskeletal: Negative for arthralgias and myalgias.   Neurological: Negative for light-headedness and headaches.   Psychiatric/Behavioral: Negative for agitation and confusion.     Objective:     Vital Signs (Most Recent):  Temp: 99.7 °F (37.6 °C) (04/02/20 2333)  Pulse: (!) 120 (04/02/20 2333)  Resp: (!) 22 (04/02/20 2333)  BP: 111/64 (04/02/20 2333)  SpO2: 99 % (04/02/20 2333) Vital Signs (24h Range):  Temp:  [97.5 °F (36.4 °C)-99.8 °F (37.7 °C)] 99.7 °F (37.6 °C)  Pulse:  [116-130] 120  Resp:  [16-33] 22  SpO2:  [98 %-100 %] 99 %  BP: ()/(44-78) 111/64     Weight: 54.1 kg (119 lb 4.3 oz)  Body mass index is 21.13 kg/m².    Physical Exam   Constitutional: She is oriented to person, place, and time. She appears well-developed and well-nourished. No distress.   HENT:   Head: Normocephalic and atraumatic.   Eyes: EOM are normal.   Neck: Normal range of motion. No JVD present. No thyromegaly present.   Cardiovascular: Regular rhythm, normal heart sounds and intact distal pulses. Exam reveals no friction rub.   No murmur heard.  No pericardial rub. Tachycardic   Pulmonary/Chest: Effort normal and breath sounds normal. No respiratory distress. She has no wheezes.   Abdominal: Soft. Bowel sounds are normal. She exhibits no distension. There is no tenderness.   Musculoskeletal: Normal range of motion. She exhibits no edema.   Neurological: She is alert and oriented to  person, place, and time.   Skin: Skin is warm and dry.   Nursing note and vitals reviewed.       Significant Labs:   CMP:   Recent Labs   Lab 04/02/20  1425   *   K 3.8   CL 99*   CO2 25   *   BUN 11   CREATININE 0.7   CALCIUM 8.9   PROT 7.4   ALBUMIN 3.2*   BILITOT 0.9   ALKPHOS 102   AST 12*   ALT 13*   ANIONGAP 11   EGFRNONAA >60.0       Significant Imaging: I have reviewed and interpreted all pertinent imaging results/findings within the past 24 hours.    Assessment/Plan:     * Pericardial effusion  29-year-old female presented to the emergency department at [facility] reporting intermittent nonradiating left-sided chest pain with shortness of breath and nausea over the past week. In the ED she was afebrile with heart rate into the 120s and respiratory rate in the low 20s. No JVD.  Initial lab workup revealed WBC 18.9, hemoglobin 9, D-dimer 2.17, creatinine 0.7, BNP 99, negative troponin. CTA of the chest negative for PE but does reveal moderate pericardial effusion without evidence for cardiac mass. She was then transferred to [facility] for cardiac evaluation for pericardial effusion.     Symptoms extremely consistent with pericarditis causing pericardial effusion. Patient not in tamponade at the moment. Cause of pericarditis uncertain at this time but highest on the list would be viral vs postpartum.     Plan  Consult to cardiology for possible pericardiocentesis  If performed collect pericardial fluid studies.   TTE  125 cc fluid hr for a liter  600 mg ibuprofen tid  1 mg Colchine bid loading dose      Iron deficiency anemia  Patient with a hgb baseline around 9. Comes in around her baseline    Plan  Consider repeating iron studies      Viral meningitis  Of note patient is postpartum from 11 weeks. Patient also reports she was discharged from [facility] approximately 3 weeks ago after and approximately 8 day admission for possible viral meningitis but no distinct cause  found. Now patient now has pericarditis and a pericardial effusion.     Plan  Consult Infectious disease  Possible to link vague meningitis symptoms to pericarditis        VTE Risk Mitigation (From admission, onward)         Ordered     IP VTE LOW RISK PATIENT  Once      04/03/20 0041                   Grayson Johnson MD  Department of Hospital Medicine   [facility]

## 2020-04-03 NOTE — SUBJECTIVE & OBJECTIVE
Past Medical History:   Diagnosis Date    H/O elbow surgery     Oral contraceptive use        Past Surgical History:   Procedure Laterality Date    left elbow Left 03/30/2017    torn ligament       Review of patient's allergies indicates:   Allergen Reactions    No known drug allergies        Current Facility-Administered Medications on File Prior to Encounter   Medication    [COMPLETED] iohexoL (OMNIPAQUE 350) injection 100 mL    [COMPLETED] ketorolac injection 30 mg    [COMPLETED] ketorolac injection 30 mg    [COMPLETED] ondansetron injection 4 mg    [COMPLETED] sodium chloride 0.9% bolus 1,000 mL    [DISCONTINUED] acetaminophen tablet 1,000 mg    [DISCONTINUED] cefTRIAXone injection 1,000 mg     Current Outpatient Medications on File Prior to Encounter   Medication Sig    ferrous sulfate (FEOSOL) 325 mg (65 mg iron) Tab tablet Take 1 tablet (325 mg total) by mouth 2 (two) times daily.    norethindrone-ethinyl estradiol-iron (ESTROSTEP FE) 1-20(5)/1-30(7) /1mg-35mcg (9) Tab Take by mouth once daily.    pantoprazole (PROTONIX) 40 MG tablet Take 1 tablet (40 mg total) by mouth once daily.    azithromycin (Z-JEFF) 250 MG tablet Take 1 tablet by mouth once daily.    butalbital-acetaminophen-caffeine -40 mg (FIORICET, ESGIC) -40 mg per tablet Take 1 tablet by mouth every 6 (six) hours as needed for Headaches.     Family History     Problem Relation (Age of Onset)    Breast cancer Other    Heart attack Maternal Grandfather    No Known Problems Maternal Grandmother, Sister, Paternal Grandmother, Paternal Grandfather    Skin cancer Other        Tobacco Use    Smoking status: Never Smoker    Smokeless tobacco: Never Used   Substance and Sexual Activity    Alcohol use: Yes    Drug use: No    Sexual activity: Yes     Partners: Male     Review of Systems   Constitution: Negative for chills, diaphoresis and fever.   HENT: Negative for congestion and sore throat.    Cardiovascular: Positive for  chest pain and dyspnea on exertion. Negative for leg swelling and palpitations.   Respiratory: Positive for shortness of breath. Negative for sputum production and wheezing.    Gastrointestinal: Negative for bloating, abdominal pain, nausea and vomiting.   Neurological: Negative for dizziness and focal weakness.     Objective:     Vital Signs (Most Recent):  Temp: 99.2 °F (37.3 °C) (04/03/20 0409)  Pulse: (!) 116 (04/03/20 0823)  Resp: 20 (04/03/20 0409)  BP: 112/65 (04/03/20 0409)  SpO2: 99 % (04/03/20 0409) Vital Signs (24h Range):  Temp:  [97.5 °F (36.4 °C)-99.8 °F (37.7 °C)] 99.2 °F (37.3 °C)  Pulse:  [108-130] 116  Resp:  [16-33] 20  SpO2:  [98 %-100 %] 99 %  BP: ()/(44-78) 112/65     Weight: 54.1 kg (119 lb 4.3 oz)  Body mass index is 21.13 kg/m².    SpO2: 99 %  O2 Device (Oxygen Therapy): room air      Intake/Output Summary (Last 24 hours) at 4/3/2020 0856  Last data filed at 4/3/2020 0413  Gross per 24 hour   Intake 240 ml   Output 300 ml   Net -60 ml       Lines/Drains/Airways     Peripheral Intravenous Line                 Peripheral IV - Single Lumen 04/02/20 1427 20 G Right Antecubital less than 1 day                Physical Exam  Deferred at this time in order to limit patient interaction in the setting of the COVID-19 pandemic.     Significant Labs: All pertinent labs within the past 24 hours have been reviewed.     Recent Labs   Lab 03/31/20  1059 04/02/20  1425   WBC 7.80 18.90*   HGB 9.1* 9.0*   HCT 26.5* 27.1*   * 495*   MCV 84 84   RDW 13.9 13.9   NA  --  135*   K  --  3.8   CL  --  99*   CO2  --  25   BUN  --  11   CREATININE  --  0.7   GLU  --  135*   PROT  --  7.4   ALBUMIN  --  3.2*   BILITOT  --  0.9   AST  --  12*   ALKPHOS  --  102   ALT  --  13*       Significant Imaging: I have reviewed all pertinent imaging results/findings within the past 24 hours.

## 2020-04-03 NOTE — HPI
"Patient is a 30 yo F with recent history of viral meningitis 3 weeks ago who presents as a transfer from [facility] for cardiology evaluation of a moderate sized pleural effusion noted on CT scan.  Patient reports 1 week history of shortness of breath and pleuritic chest pain that worsened overnight and led to her presentation to the ED.  She denies any fevers, chills, nausea, vomiting, cough, wheezing, abdominal pain.   She currently describes the chest pain as sharp (10/10 pain) and states "it feels like someone is squeezing my heart."  CT chest was preformed at the OSH, which was negative for PE but showed a moderate sized pleural effusion.     In the ED, she was tachycardic to 120s but otherwise normotensive and afebrile.  She is non-toxic appearing.  ECG shows sinus tachycardia with slight ST segment elevation in leads I and AVL.  COVID-19 testing and RIP is pending.  Labs remarkable for a WBC 18k, hemoglobin 9, and platelets 495.  Lactate of 1.4.The primary team is treating her for possible viral pericarditis with NSAIDs and colchicine, which seems to provide the patient some relief.  HEATHER, RF, and anit-CCP antibodies negative at previous hospital admission.  Troponin and BNP WNL.  Cardiology consulted for evaluation of pleural effusion for possible pericardiocentesis.   "

## 2020-04-03 NOTE — PROGRESS NOTES
Pt admitted to CSU. Pt arrived to floor via EMS. Telemetry transferred to CSU monitor. VSS. NAD. No complaints at this time.  Plan of care initiated. CSU orders imitated. Bed in lowest position, SR up x2, call bell in reach. Will continue to monitor pt.

## 2020-04-03 NOTE — CONSULTS
"[facility]  Infectious Disease  Consult Note    Patient Name: Melinda Parekh  MRN: [id number]  Admission Date: 4/3/2020  Hospital Length of Stay: 0 days  Attending Physician: Ariel Burks MD  Primary Care Provider: Savage Patterson MD     Isolation Status: Airborne and Contact and Droplet    Patient information was obtained from patient, past medical records and ER records.      Inpatient consult to Infectious Diseases  Consult performed by: Cristian Milan MD  Consult ordered by: Grayson Johnson MD        Assessment/Plan:     * Pericardial effusion  28 yo F with recent history of viral meningitis 3 weeks ago who presented to OSH w/ 1 week history of shortness of breath and pleuritic chest pain that worsened overnight s/p transfer to [facility]. She describes the chest pain as sharp (10/10 pain) and states "it feels like someone is squeezing my heart." Improved w/ leaning forward and worsened w/ leaning on back. In ED was noted to be tachycardic. CTA was negative for PE but demonstrated moderate pericardial effusion. She was started on NSAIDs and colchicine. Additionally, cardiology has been consulted. ID is consulted for evaluation of possible viral pericarditis given prior hx of meningitis.    Recommendations  Viruses (Coxsackievirus, and echovirus) are one of the most common etiologies of pericarditis, other etiologies would include uremia (not present), autoimmune (SLE (HEATHER negative), infectious (fungal and bacterial although less likely, TB), hypothyroidism, drug induced, neoplasm  Given prior history of viral meningitis viral etiology is certainly possible, however there would not be anti-viral treatment  F/u COVID-19 PCR  F/u RIP   Would discuss with patient and check HIV as well  F/u cardiology recommendations for treatment        Thank you for your consult. I will sign off. Please contact us if you have any additional questions.    Cristian Milan MD  Infectious Disease  [facility] "[facility]    Subjective:     Principal Problem: Pericardial effusion    HPI: 28 yo F with recent history of viral meningitis 3 weeks ago who presented to OSH w/ 1 week history of shortness of breath and pleuritic chest pain that worsened overnight s/p transfer to [facility]. She describes the chest pain as sharp (10/10 pain) and states "it feels like someone is squeezing my heart." Improved w/ leaning forward and worsened w/ leaning on back. In ED was noted to be tachycardic. CTA was negative for PE but demonstrated moderate pericardial effusion. She was started on NSAIDs and colchicine. Additionally, cardiology has been consulted.  Of note she was recently hospitalized for viral meningitis when she presented w/ headache, nausea, diarrhea w/ LP remarkable for  (68% segs, 4% lymph), elevated protein 174, low glucose 39. HSV PCR negative and cultures negative. She received 8 days of Rocephin during work-up. Following discharged from that admission she continued to have fever x 1 week which has since resolved, however, she continues to have daily night sweats. Prior HEATHER, RF, anti-CCP negative.  She denies any fevers, chills, nausea, vomiting, cough, wheezing, abdominal pain.   No sick contacts. 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  ID is consulted for evaluation of pericardial effusion given prior hx of meningitis.    Past Medical History:   Diagnosis Date    H/O elbow surgery     Oral contraceptive use        Past Surgical History:   Procedure Laterality Date    left elbow Left 03/30/2017    torn ligament       Review of patient's allergies indicates:   Allergen Reactions    No known drug allergies        Medications:  Medications Prior to Admission   Medication Sig    ferrous sulfate (FEOSOL) 325 mg (65 mg iron) Tab tablet Take 1 tablet " (325 mg total) by mouth 2 (two) times daily.    norethindrone-ethinyl estradiol-iron (ESTROSTEP FE) 1-20(5)/1-30(7) /1mg-35mcg (9) Tab Take by mouth once daily.    pantoprazole (PROTONIX) 40 MG tablet Take 1 tablet (40 mg total) by mouth once daily.    azithromycin (Z-JEFF) 250 MG tablet Take 1 tablet by mouth once daily.    butalbital-acetaminophen-caffeine -40 mg (FIORICET, ESGIC) -40 mg per tablet Take 1 tablet by mouth every 6 (six) hours as needed for Headaches.     Antibiotics (From admission, onward)    Start     Stop Route Frequency Ordered    04/03/20 2015  cefTRIAXone injection 1 g      -- IV Every 24 hours (non-standard times) 04/03/20 0650        Antifungals (From admission, onward)    None        Antivirals (From admission, onward)    None           Immunization History   Administered Date(s) Administered    DTaP 1988, 02/17/1989, 04/28/1989, 06/08/1990, 02/12/1993    Hepatitis B, Pediatric/Adolescent 07/14/1999, 02/25/2003, 06/13/2007    IPV 01/18/1989, 03/17/1989, 02/12/1993, 08/18/1995    Influenza - Quadrivalent - PF (6 months and older) 09/25/2019    MMR 02/02/1990, 02/12/1993, 03/05/2015    Measles 02/01/1993    Meningococcal Conjugate (MCV4P) 06/01/2007, 06/13/2007    Mumps 02/01/1990    Rubella 02/01/1990    Td (ADULT) 02/01/2003, 02/25/2003    Tdap 03/20/2014, 11/07/2019       Family History     Problem Relation (Age of Onset)    Breast cancer Other    Heart attack Maternal Grandfather    No Known Problems Maternal Grandmother, Sister, Paternal Grandmother, Paternal Grandfather    Skin cancer Other        Social History     Socioeconomic History    Marital status:      Spouse name: Not on file    Number of children: Not on file    Years of education: Not on file    Highest education level: Not on file   Occupational History     Employer: [employer]   Social Needs    Financial resource strain: Not on file    Food insecurity:     Worry: Not on  file     Inability: Not on file    Transportation needs:     Medical: Not on file     Non-medical: Not on file   Tobacco Use    Smoking status: Never Smoker    Smokeless tobacco: Never Used   Substance and Sexual Activity    Alcohol use: Yes    Drug use: No    Sexual activity: Yes     Partners: Male   Lifestyle    Physical activity:     Days per week: Not on file     Minutes per session: Not on file    Stress: Not on file   Relationships    Social connections:     Talks on phone: Not on file     Gets together: Not on file     Attends Sabianist service: Not on file     Active member of club or organization: Not on file     Attends meetings of clubs or organizations: Not on file     Relationship status: Not on file   Other Topics Concern    Are you pregnant or think you may be? Not Asked    Breast-feeding Not Asked   Social History Narrative    Not on file     Review of Systems   Constitutional: Negative for fever.        Positive for night sweats   Respiratory: Positive for shortness of breath. Negative for cough.    Cardiovascular: Positive for chest pain. Negative for leg swelling.   Gastrointestinal: Negative for abdominal pain, diarrhea, nausea and vomiting.   Musculoskeletal: Negative for arthralgias and myalgias.   Skin: Negative for rash.   Psychiatric/Behavioral: Negative for agitation and confusion.     Objective:     Vital Signs (Most Recent):  Temp: 98.6 °F (37 °C) (04/03/20 1653)  Pulse: (!) 111 (04/03/20 1653)  Resp: 16 (04/03/20 1653)  BP: (!) 98/54 (04/03/20 1653)  SpO2: 97 % (04/03/20 1653) Vital Signs (24h Range):  Temp:  [98.4 °F (36.9 °C)-99.8 °F (37.7 °C)] 98.6 °F (37 °C)  Pulse:  [101-126] 111  Resp:  [16-33] 16  SpO2:  [97 %-100 %] 97 %  BP: ()/(54-78) 98/54     Weight: 54 kg (119 lb)  Body mass index is 21.08 kg/m².    Estimated Creatinine Clearance: 96.3 mL/min (based on SCr of 0.7 mg/dL).    Physical Exam   Constitutional: She is oriented to person, place, and time. She  appears well-developed and well-nourished. No distress.   HENT:   Head: Normocephalic and atraumatic.   Eyes: Conjunctivae are normal. Right eye exhibits no discharge. Left eye exhibits no discharge. No scleral icterus.   Neck: Normal range of motion. Neck supple. No thyromegaly present.   Cardiovascular: Regular rhythm.   No pericardial rub. Tachycardic   Pulmonary/Chest: Effort normal. No respiratory distress.   Abdominal: Soft. Bowel sounds are normal. She exhibits no distension. There is no tenderness.   Musculoskeletal: She exhibits no edema.   Neurological: She is alert and oriented to person, place, and time.   Skin: Skin is warm and dry. No rash noted. No erythema.   Psychiatric: She has a normal mood and affect. Her behavior is normal.   Nursing note and vitals reviewed.      Significant Labs:   Blood Culture:   Recent Labs   Lab 03/03/20  1204 03/03/20  1330 03/06/20  0638   LABBLOO No growth after 5 days. No growth after 5 days. No growth after 5 days.     CBC:   Recent Labs   Lab 04/02/20  1425   WBC 18.90*   HGB 9.0*   HCT 27.1*   *     CMP:   Recent Labs   Lab 04/02/20  1425   *   K 3.8   CL 99*   CO2 25   *   BUN 11   CREATININE 0.7   CALCIUM 8.9   PROT 7.4   ALBUMIN 3.2*   BILITOT 0.9   ALKPHOS 102   AST 12*   ALT 13*   ANIONGAP 11   EGFRNONAA >60.0     Microbiology Results (last 7 days)     ** No results found for the last 168 hours. **          Significant Imaging: I have reviewed all pertinent imaging results/findings within the past 24 hours.

## 2020-04-03 NOTE — ASSESSMENT & PLAN NOTE
Patient with a hgb baseline around 9. Comes in around her baseline    Plan  Consider repeating iron studies

## 2020-04-03 NOTE — CONSULTS
"[facility]  Cardiology  Consult Note    Patient Name: Melinda Parekh  MRN: [id number]  Admission Date: 4/3/2020  Hospital Length of Stay: 0 days  Code Status: Full Code   Attending Provider: Ariel Burks MD   Consulting Provider: Aamir Melo MD  Primary Care Physician: Savage Patterson MD  Principal Problem:Pericardial effusion    Patient information was obtained from patient, past medical records and ER records.     Inpatient consult to Cardiology  Consult performed by: Aamir Melo MD  Consult ordered by: Grayson Johnson MD        Subjective:     Chief Complaint:  Chest pain      HPI:   Patient is a 28 yo F with recent history of viral meningitis 3 weeks ago who presents as a transfer from [facility] for cardiology evaluation of a moderate sized pleural effusion noted on CT scan.  Patient reports 1 week history of shortness of breath and pleuritic chest pain that worsened overnight and led to her presentation to the ED.  She denies any fevers, chills, nausea, vomiting, cough, wheezing, abdominal pain.   She currently describes the chest pain as sharp (10/10 pain) and states "it feels like someone is squeezing my heart."  CT chest was preformed at the OSH, which was negative for PE but showed a moderate sized pleural effusion.     In the ED, she was tachycardic to 120s but otherwise normotensive and afebrile.  She is non-toxic appearing.  ECG shows sinus tachycardia with slight ST segment elevation in leads I and AVL.  COVID-19 testing and RIP is pending.  Labs remarkable for a WBC 18k, hemoglobin 9, and platelets 495.  Lactate of 1.4.The primary team is treating her for possible viral pericarditis with NSAIDs and colchicine, which seems to provide the patient some relief.  HEATHER, RF, and anit-CCP antibodies negative at previous hospital admission.  Troponin and BNP WNL.  Cardiology consulted for evaluation of pleural effusion for possible pericardiocentesis.     Past Medical History: "   Diagnosis Date    H/O elbow surgery     Oral contraceptive use        Past Surgical History:   Procedure Laterality Date    left elbow Left 03/30/2017    torn ligament       Review of patient's allergies indicates:   Allergen Reactions    No known drug allergies        Current Facility-Administered Medications on File Prior to Encounter   Medication    [COMPLETED] iohexoL (OMNIPAQUE 350) injection 100 mL    [COMPLETED] ketorolac injection 30 mg    [COMPLETED] ketorolac injection 30 mg    [COMPLETED] ondansetron injection 4 mg    [COMPLETED] sodium chloride 0.9% bolus 1,000 mL    [DISCONTINUED] acetaminophen tablet 1,000 mg    [DISCONTINUED] cefTRIAXone injection 1,000 mg     Current Outpatient Medications on File Prior to Encounter   Medication Sig    ferrous sulfate (FEOSOL) 325 mg (65 mg iron) Tab tablet Take 1 tablet (325 mg total) by mouth 2 (two) times daily.    norethindrone-ethinyl estradiol-iron (ESTROSTEP FE) 1-20(5)/1-30(7) /1mg-35mcg (9) Tab Take by mouth once daily.    pantoprazole (PROTONIX) 40 MG tablet Take 1 tablet (40 mg total) by mouth once daily.    azithromycin (Z-JEFF) 250 MG tablet Take 1 tablet by mouth once daily.    butalbital-acetaminophen-caffeine -40 mg (FIORICET, ESGIC) -40 mg per tablet Take 1 tablet by mouth every 6 (six) hours as needed for Headaches.     Family History     Problem Relation (Age of Onset)    Breast cancer Other    Heart attack Maternal Grandfather    No Known Problems Maternal Grandmother, Sister, Paternal Grandmother, Paternal Grandfather    Skin cancer Other        Tobacco Use    Smoking status: Never Smoker    Smokeless tobacco: Never Used   Substance and Sexual Activity    Alcohol use: Yes    Drug use: No    Sexual activity: Yes     Partners: Male     Review of Systems   Constitution: Negative for chills, diaphoresis and fever.   HENT: Negative for congestion and sore throat.    Cardiovascular: Positive for chest pain and dyspnea  on exertion. Negative for leg swelling and palpitations.   Respiratory: Positive for shortness of breath. Negative for sputum production and wheezing.    Gastrointestinal: Negative for bloating, abdominal pain, nausea and vomiting.   Neurological: Negative for dizziness and focal weakness.     Objective:     Vital Signs (Most Recent):  Temp: 99.2 °F (37.3 °C) (04/03/20 0409)  Pulse: (!) 116 (04/03/20 0823)  Resp: 20 (04/03/20 0409)  BP: 112/65 (04/03/20 0409)  SpO2: 99 % (04/03/20 0409) Vital Signs (24h Range):  Temp:  [97.5 °F (36.4 °C)-99.8 °F (37.7 °C)] 99.2 °F (37.3 °C)  Pulse:  [108-130] 116  Resp:  [16-33] 20  SpO2:  [98 %-100 %] 99 %  BP: ()/(44-78) 112/65     Weight: 54.1 kg (119 lb 4.3 oz)  Body mass index is 21.13 kg/m².    SpO2: 99 %  O2 Device (Oxygen Therapy): room air      Intake/Output Summary (Last 24 hours) at 4/3/2020 0856  Last data filed at 4/3/2020 0413  Gross per 24 hour   Intake 240 ml   Output 300 ml   Net -60 ml       Lines/Drains/Airways     Peripheral Intravenous Line                 Peripheral IV - Single Lumen 04/02/20 1427 20 G Right Antecubital less than 1 day                Physical Exam  Deferred at this time in order to limit patient interaction in the setting of the COVID-19 pandemic.     Significant Labs: All pertinent labs within the past 24 hours have been reviewed.     Recent Labs   Lab 03/31/20  1059 04/02/20  1425   WBC 7.80 18.90*   HGB 9.1* 9.0*   HCT 26.5* 27.1*   * 495*   MCV 84 84   RDW 13.9 13.9   NA  --  135*   K  --  3.8   CL  --  99*   CO2  --  25   BUN  --  11   CREATININE  --  0.7   GLU  --  135*   PROT  --  7.4   ALBUMIN  --  3.2*   BILITOT  --  0.9   AST  --  12*   ALKPHOS  --  102   ALT  --  13*       Significant Imaging: I have reviewed all pertinent imaging results/findings within the past 24 hours.       Assessment and Plan:     * Pericardial effusion  Patient is a 30 yo F with recent history of viral meningitis 3 weeks ago who presents as a  transfer from [facility] for cardiology evaluation of a moderate sized pleural effusion noted on CT scan.  She has had pleuritic chest pain and dyspnea for the past week that acutely worsened yesterday.  She is in sinus tach but otherwise normotensive and febrile.  Viral labs pending including COVID-19.   ECG shows sinus tach and slight ST seg elevations in leads I and AVL.  Her clinical picture appears most consistent with viral pericarditis given recent viral meningitis, pleuritic chest pain, ECG changes, and moderate sized pericardial fluid.   ---TTE on 4/3: Normal LV and EF.  Moderate circumferential pericardial effusion with max diameter 1.7cm located laterally. Effusion is fibrinous. There is no diastolic collapse of the RA/RV. No significant respiratory flow variation. The IVC is large and does not collapse with respiration. Would recommend continued monitoring.    Recommendations:  - No pericardiocentesis at this time as patient is not hemodynamically comprised.  Her TTE does not show diastolic collapse of the RA/RV.   - continue antiinflammatory management:   - ibuprofen 800mg TID PO for 1-2 weeks.  Begin tapering once patient is symptom free or CRP decreases    - colchicine 0.5mg BID PO for 3 months   - Patient will need close follow up with a repeat ECHO on Monday or Tuesday to assess for decrease in size of the effusion as well as repeat CRP and ESR to ensure an inflammatory down trend.   - If patient develops any worsening of her chest pain, shortness of breath or any other concerning symptoms, she is to return to the ED for immediate reevaluation.   - Encourage good PO intake to remain euvolemic and to decrease the potential of RA collapse from hypovolemia.         VTE Risk Mitigation (From admission, onward)         Ordered     IP VTE LOW RISK PATIENT  Once      04/03/20 0041                Thank you for your consult. I will sign off. Please contact us if you have any additional  questions.    Aamir Melo MD  Cardiology   [facility]

## 2020-04-03 NOTE — ASSESSMENT & PLAN NOTE
Patient is a 30 yo F with recent history of viral meningitis 3 weeks ago who presents as a transfer from [facility] for cardiology evaluation of a moderate sized pleural effusion noted on CT scan.  She has had pleuritic chest pain and dyspnea for the past week that acutely worsened yesterday.  She is in sinus tach but otherwise normotensive and febrile.  Viral labs pending including COVID-19.   ECG shows sinus tach and slight ST seg elevations in leads I and AVL.  Her clinical picture appears most consistent with viral pericarditis given recent viral meningitis, pleuritic chest pain, ECG changes, and moderate sized pericardial fluid.   ---TTE on 4/3: Normal LV and EF.  Moderate circumferential pericardial effusion with max diameter 1.7cm located laterally. Effusion is fibrinous. There is no diastolic collapse of the RA/RV. No significant respiratory flow variation. The IVC is large and does not collapse with respiration. Would recommend continued monitoring.    Recommendations:  - No pericardiocentesis at this time as patient is not hemodynamically comprised.  Her TTE does not show diastolic collapse of the RA/RV.   - continue antiinflammatory management:   - ibuprofen 800mg TID PO for 1-2 weeks.  Begin tapering once patient is symptom free or CRP decreases    - colchicine 0.5mg BID PO for 3 months   - Patient will need close follow up with a repeat ECHO on Monday or Tuesday to assess for decrease in size of the effusion as well as repeat CRP and ESR to ensure an inflammatory down trend.   - If patient develops any worsening of her chest pain, shortness of breath or any other concerning symptoms, she is to return to the ED for immediate reevaluation.   - Encourage good PO intake to remain euvolemic and to decrease the potential of RA collapse from hypovolemia.

## 2020-04-03 NOTE — ASSESSMENT & PLAN NOTE
29-year-old female presented to the emergency department at [facility] reporting intermittent nonradiating left-sided chest pain with shortness of breath and nausea over the past week. In the ED she was afebrile with heart rate into the 120s and respiratory rate in the low 20s. No JVD.  Initial lab workup revealed WBC 18.9, hemoglobin 9, D-dimer 2.17, creatinine 0.7, BNP 99, negative troponin. CTA of the chest negative for PE but does reveal moderate pericardial effusion without evidence for cardiac mass. She was then transferred to [facility] for cardiac evaluation for pericardial effusion.     Symptoms extremely consistent with pericarditis causing pericardial effusion. Patient not in tamponade at the moment. Cause of pericarditis uncertain at this time but highest on the list would be viral vs postpartum.     Plan  Consult to cardiology for possible pericardiocentesis  If performed collect pericardial fluid studies.   TTE  Nsaids and Colchine

## 2020-04-04 VITALS
SYSTOLIC BLOOD PRESSURE: 126 MMHG | TEMPERATURE: 98 F | WEIGHT: 119 LBS | HEIGHT: 63 IN | BODY MASS INDEX: 21.09 KG/M2 | DIASTOLIC BLOOD PRESSURE: 68 MMHG | RESPIRATION RATE: 18 BRPM | OXYGEN SATURATION: 100 % | HEART RATE: 97 BPM

## 2020-04-04 LAB
ADENOVIRUS: NOT DETECTED
BASOPHILS # BLD AUTO: 0.02 K/UL (ref 0–0.2)
BASOPHILS NFR BLD: 0.3 % (ref 0–1.9)
BORDETELLA PARAPERTUSSIS (IS1001): NOT DETECTED
BORDETELLA PERTUSSIS (PTXP): NOT DETECTED
CHLAMYDIA PNEUMONIAE: NOT DETECTED
CORONAVIRUS 229E, COMMON COLD VIRUS: NOT DETECTED
CORONAVIRUS HKU1, COMMON COLD VIRUS: NOT DETECTED
CORONAVIRUS NL63, COMMON COLD VIRUS: NOT DETECTED
CORONAVIRUS OC43, COMMON COLD VIRUS: NOT DETECTED
DIFFERENTIAL METHOD: ABNORMAL
EOSINOPHIL # BLD AUTO: 0.1 K/UL (ref 0–0.5)
EOSINOPHIL NFR BLD: 1.5 % (ref 0–8)
ERYTHROCYTE [DISTWIDTH] IN BLOOD BY AUTOMATED COUNT: 13.9 % (ref 11.5–14.5)
FLUBV RNA NPH QL NAA+NON-PROBE: NOT DETECTED
HCT VFR BLD AUTO: 26.6 % (ref 37–48.5)
HGB BLD-MCNC: 8.3 G/DL (ref 12–16)
HPIV1 RNA NPH QL NAA+NON-PROBE: NOT DETECTED
HPIV2 RNA NPH QL NAA+NON-PROBE: NOT DETECTED
HPIV3 RNA NPH QL NAA+NON-PROBE: NOT DETECTED
HPIV4 RNA NPH QL NAA+NON-PROBE: NOT DETECTED
HUMAN METAPNEUMOVIRUS: NOT DETECTED
IMM GRANULOCYTES # BLD AUTO: 0.04 K/UL (ref 0–0.04)
IMM GRANULOCYTES NFR BLD AUTO: 0.5 % (ref 0–0.5)
INFLUENZA A (SUBTYPES H1,H1-2009,H3): NOT DETECTED
LYMPHOCYTES # BLD AUTO: 0.7 K/UL (ref 1–4.8)
LYMPHOCYTES NFR BLD: 9.4 % (ref 18–48)
MCH RBC QN AUTO: 28 PG (ref 27–31)
MCHC RBC AUTO-ENTMCNC: 31.2 G/DL (ref 32–36)
MCV RBC AUTO: 90 FL (ref 82–98)
MONOCYTES # BLD AUTO: 0.5 K/UL (ref 0.3–1)
MONOCYTES NFR BLD: 6.3 % (ref 4–15)
MYCOPLASMA PNEUMONIAE: NOT DETECTED
NEUTROPHILS # BLD AUTO: 6 K/UL (ref 1.8–7.7)
NEUTROPHILS NFR BLD: 82 % (ref 38–73)
NRBC BLD-RTO: 0 /100 WBC
PLATELET # BLD AUTO: 393 K/UL (ref 150–350)
PMV BLD AUTO: 8.9 FL (ref 9.2–12.9)
RBC # BLD AUTO: 2.96 M/UL (ref 4–5.4)
RESPIRATORY INFECTION PANEL SOURCE: NORMAL
RSV RNA NPH QL NAA+NON-PROBE: NOT DETECTED
RV+EV RNA NPH QL NAA+NON-PROBE: NOT DETECTED
WBC # BLD AUTO: 7.34 K/UL (ref 3.9–12.7)

## 2020-04-04 PROCEDURE — 99238 HOSP IP/OBS DSCHRG MGMT 30/<: CPT | Mod: ,,, | Performed by: INTERNAL MEDICINE

## 2020-04-04 PROCEDURE — 25000003 PHARM REV CODE 250: Performed by: HOSPITALIST

## 2020-04-04 PROCEDURE — 63600175 PHARM REV CODE 636 W HCPCS: Performed by: NURSE PRACTITIONER

## 2020-04-04 PROCEDURE — 25000003 PHARM REV CODE 250: Performed by: STUDENT IN AN ORGANIZED HEALTH CARE EDUCATION/TRAINING PROGRAM

## 2020-04-04 PROCEDURE — 36415 COLL VENOUS BLD VENIPUNCTURE: CPT

## 2020-04-04 PROCEDURE — 85025 COMPLETE CBC W/AUTO DIFF WBC: CPT

## 2020-04-04 PROCEDURE — 25000003 PHARM REV CODE 250: Performed by: NURSE PRACTITIONER

## 2020-04-04 PROCEDURE — 86703 HIV-1/HIV-2 1 RESULT ANTBDY: CPT

## 2020-04-04 PROCEDURE — 87798 DETECT AGENT NOS DNA AMP: CPT

## 2020-04-04 PROCEDURE — 99238 PR HOSPITAL DISCHARGE DAY,<30 MIN: ICD-10-PCS | Mod: ,,, | Performed by: INTERNAL MEDICINE

## 2020-04-04 RX ORDER — IBUPROFEN 600 MG/1
TABLET ORAL
Qty: 63 TABLET | Refills: 0 | Status: SHIPPED | OUTPATIENT
Start: 2020-04-04 | End: 2020-05-02

## 2020-04-04 RX ORDER — COLCHICINE 0.6 MG/1
0.6 TABLET ORAL DAILY
Qty: 30 TABLET | Refills: 2 | Status: SHIPPED | OUTPATIENT
Start: 2020-04-05 | End: 2020-12-14 | Stop reason: ALTCHOICE

## 2020-04-04 RX ADMIN — LIDOCAINE HYDROCHLORIDE 2.1 ML: 10 INJECTION, SOLUTION INFILTRATION; PERINEURAL at 12:04

## 2020-04-04 RX ADMIN — IBUPROFEN 600 MG: 600 TABLET, FILM COATED ORAL at 09:04

## 2020-04-04 RX ADMIN — CEFTRIAXONE SODIUM 1 G: 1 INJECTION, POWDER, FOR SOLUTION INTRAMUSCULAR; INTRAVENOUS at 12:04

## 2020-04-04 RX ADMIN — IBUPROFEN 600 MG: 600 TABLET, FILM COATED ORAL at 04:04

## 2020-04-04 RX ADMIN — PANTOPRAZOLE SODIUM 40 MG: 40 TABLET, DELAYED RELEASE ORAL at 09:04

## 2020-04-04 RX ADMIN — COLCHICINE 0.6 MG: 0.6 TABLET, FILM COATED ORAL at 09:04

## 2020-04-04 NOTE — PLAN OF CARE
Pt AAOX4 NAD, VSS. No complaints of pain or SOB. POC reviewed with pt. Pt only request is to keep IV out since she was told by other providers that she would be Discharged today. Pt instructed to use call light for help. Will continue to monitor.

## 2020-04-04 NOTE — PROGRESS NOTES
Discharge Summary  Hospital Medicine  [facility]      Attending Physician on Discharge: Jim Cormier MD  Hospital Medicine Team: [facility]  Date of Admission:  4/3/2020     Date of Discharge:      Active Hospital Problems    Diagnosis  POA    *Pericardial effusion [I31.3]  Yes    Viral meningitis [A87.9]  Yes    Iron deficiency anemia [D50.9]  Yes      Resolved Hospital Problems   No resolved problems to display.       Consults:   Consults (From admission, onward)        Status Ordering Provider     Inpatient consult to Cardiology  Once     Provider:  (Not yet assigned)    Completed MIYA BLANCAS     Inpatient consult to Infectious Diseases  Once     Provider:  (Not yet assigned)    Completed MIYA BLANCAS     Inpatient consult to Midline team  Once     Provider:  (Not yet assigned)    Completed CHANCE STERLING             History of Present Illness:HPI:  29-year-old female presented to the emergency department at [facility] reporting intermittent nonradiating left-sided chest pain with shortness of breath and nausea over the past week.  Patient reports chest pain and difficulty breathing significantly increased suddenly after eating a steak last night. Described as pressure-like and sharp without radiation. It is worsened by certain positions, deep breathing and eating. Primary symptoms include shortness of breath, palpitations and nausea .Patient denies fever, fatigue, syncope, cough, wheezing,  abdominal pain, no vomiting, no dizziness and no altered mental status  Patient reports symptoms continued to worsen throughout the night, so she presented to her primary care provider today and was instructed to proceed directly to the emergency department.      Of note patient is postpartum from 11 weeks. Patient also reports she was discharged from [facility] approximately 3 weeks ago after and approximately 8 day admission for possible  viral meningitis but no distinct cause found.      In the ED she was afebrile with heart rate into the 120s and respiratory rate in the low 20s.  She  was satting 100% on room air.  Blood pressures have remained in the 110s/70s.  No increased JVP.  Initial lab workup revealed WBC 18.9, hemoglobin 9, D-dimer 2.17, creatinine 0.7, BNP 99, negative troponin.  Lactic acid 1.4.  UA positive for nitrites with 3 WBC.  CTA of the chest negative for PE but does reveal moderate pericardial effusion without evidence for cardiac mass.  Maximum thickness is 22 mm.  She was given 1 L normal saline bolus on admission. She was then transferred to [facility] for cardiac evaluation for pericardial effusion.       Hospital Course:  Patient admitted for evaluation of possible COVID-19. COVID-19 was not detected. ID and Cardiology were consulted for assistance in regards to pericardial effusion. Cardiology deemed patient's TTE was reassuring and patient could follow with close ambulatory follow up. ID recommended additional viral serologies but otherwise agreed with cardiology's POC.     Interval History: NAEO. Patient was afebrile and HDS. Patient's SpO2 was stable on room air. Patient reports symptomatic improvement and denies SOB or chest tightness     Review of Systems:  Respiratory: Negative for cough, SOB, or chest tightness  Cardiovascular: Negative for chest pain, palpitations, or LE edema   GI: Negative for N/V/D or abdominal pain    Vitals:    04/04/20 0600 04/04/20 0708 04/04/20 0907 04/04/20 1117   BP:   111/70    BP Location:   Left arm    Patient Position:   Lying    Pulse: 106 102 (!) 111 98   Resp:   16    Temp:   97.7 °F (36.5 °C)    TempSrc:   Oral    SpO2:   95%    Weight:       Height:         Physical Exam:  GEN: NAD, conversant  Resp: CTABL, no wheezes or rales, normal work of breathing   CV: RRR, no m/r/g, no edema  GI: soft, NTND  Skin: no rash    Laboratory Values:  Lab Results   Component Value Date     [id number] Not Detected 04/02/2020       Recent Labs   Lab 03/31/20  1059 04/02/20  1425 04/04/20  1010   WBC 7.80 18.90* 7.34   LYMPH 17.2*  1.3 5.3*  1.0 9.4*  0.7*   HGB 9.1* 9.0* 8.3*   HCT 26.5* 27.1* 26.6*   * 495* 393*     Recent Labs   Lab 04/02/20  1425   *   K 3.8   CL 99*   CO2 25   BUN 11   CREATININE 0.7   *   CALCIUM 8.9     Recent Labs   Lab 04/02/20  1425   ALKPHOS 102   ALT 13*   AST 12*   ALBUMIN 3.2*   PROT 7.4   BILITOT 0.9   INR 1.0        Recent Labs     04/02/20  1425 04/03/20  0919   DDIMER 2.17*  --    FERRITIN  --  220   CRP  --  221.7*   LDH  --  224   BNP 99  --    TROPONINI <0.01  --    CPK  --  8*         Microbiology:  Microbiology Results (last 7 days)     Procedure Component Value Units Date/Time    Respiratory Infection Panel (PCR), Nasopharyngeal [[id number]] Collected:  04/04/20 1051    Order Status:  Sent Specimen:  Nasopharyngeal Swab Updated:  04/04/20 1121    Respiratory Infection Panel (PCR), Nasopharyngeal [[id number]]     Order Status:  Canceled Specimen:  Nasopharyngeal Swab           Imaging:   No orders to display           Cardiac:    Results for orders placed during the hospital encounter of 04/03/20   Echo Color Flow Doppler? Yes    Narrative · Normal left ventricular systolic function. The estimated ejection   fraction is 65%.  · No wall motion abnormalities.  · Normal LV diastolic function.  · Normal right ventricular systolic function.  · The estimated PA systolic pressure is 36 mmHg.  · Elevated central venous pressure (15 mmHg).  · Moderate circumferential pericardial effusion with max diameter 1.7cm   located laterally. Effusion is fibrinous. There is no diastolic collapse   of the RA/RV. No significant respiratory flow variation. The IVC is large   and does not collapse with respiration. Would recommend continued   monitoring.  · There is annulus paradoxus present, but septal wall motion does not   change with respiration. No expiratory  diastolic flow reversal in the   hepatic veins.            Procedures:   * No surgery found *        Current Discharge Medication List      CONTINUE these medications which have NOT CHANGED    Details   ferrous sulfate (FEOSOL) 325 mg (65 mg iron) Tab tablet Take 1 tablet (325 mg total) by mouth 2 (two) times daily.    Associated Diagnoses: Iron deficiency anemia, unspecified iron deficiency anemia type      norethindrone-ethinyl estradiol-iron (ESTROSTEP FE) 1-20(5)/1-30(7) /1mg-35mcg (9) Tab Take by mouth once daily.      pantoprazole (PROTONIX) 40 MG tablet Take 1 tablet (40 mg total) by mouth once daily.  Qty: 30 tablet, Refills: 0      azithromycin (Z-JEFF) 250 MG tablet Take 1 tablet by mouth once daily.      butalbital-acetaminophen-caffeine -40 mg (FIORICET, ESGIC) -40 mg per tablet Take 1 tablet by mouth every 6 (six) hours as needed for Headaches.  Qty: 20 tablet, Refills: 0               Discharge Diet:regular diet with Normal Fluid intake of 1500 - 2000 mL per day    Activity: activity as tolerated    Discharge Condition: Good    Disposition: Home or Self Care    Follow up:  No future appointments.      Tests pending at the time of discharge: none        Time spent  on the discharge of the patient including review of hospital course with the patient. reviewing discharge medications and arranging follow-up care: 60 mins    Discharge examination: Patient was seen and examined on the date of discharge and determined to be suitable for discharge.    Jim Cormier MD/MS  [facility]   Internal Medicine, PGY-2

## 2020-04-04 NOTE — NURSING
Pt IV infiltrated. Several attempts were made to obtain another IV Access. After several unsuccessful attempts, pt refused IV access and request all meds PO or shots. MD notified. New orders placed for IM Rocephin Daily with lidocaine.

## 2020-04-04 NOTE — PLAN OF CARE
Plan of care discussed with patient. Patient is free of fall/trauma/injury. Denies CP, SOB, or pain/discomfort. SARS/ CoV 2 non-detected, airborne/droplet precaution discontinued . AM meds administered and tolerated. Possible d/c today. All questions addressed. Will continue to monitor

## 2020-04-04 NOTE — NURSING
Patient is ready for discharge. Patient stable alert and oriented. Tele, and IVs removed. No complaints of pain. Discussed discharge plan. Reviewed medications and side effects, appointments, and answered questions with patient.

## 2020-04-05 NOTE — DISCHARGE SUMMARY
Discharge Summary  Hospital Medicine  [facility]      Attending Physician on Discharge: Jim Cormier MD  Hospital Medicine Team: [facility]  Date of Admission:  4/3/2020     Date of Discharge:  4/4/2020  4:53 PM    Active Hospital Problems    Diagnosis  POA    *Pericardial effusion [I31.3]  Yes    Viral meningitis [A87.9]  Yes    Iron deficiency anemia [D50.9]  Yes      Resolved Hospital Problems   No resolved problems to display.       Consults:   Consults (From admission, onward)        Status Ordering Provider     Inpatient consult to Cardiology  Once     Provider:  (Not yet assigned)    Completed MIYA BLANCAS     Inpatient consult to Infectious Diseases  Once     Provider:  (Not yet assigned)    Completed MIYA BLANCAS     Inpatient consult to Midline team  Once     Provider:  (Not yet assigned)    Completed CHANCE STERLING             History of Present Illness:HPI:  29-year-old female presented to the emergency department at [facility] reporting intermittent nonradiating left-sided chest pain with shortness of breath and nausea over the past week.  Patient reports chest pain and difficulty breathing significantly increased suddenly after eating a steak last night. Described as pressure-like and sharp without radiation. It is worsened by certain positions, deep breathing and eating. Primary symptoms include shortness of breath, palpitations and nausea .Patient denies fever, fatigue, syncope, cough, wheezing,  abdominal pain, no vomiting, no dizziness and no altered mental status  Patient reports symptoms continued to worsen throughout the night, so she presented to her primary care provider today and was instructed to proceed directly to the emergency department.      Of note patient is postpartum from 11 weeks. Patient also reports she was discharged from [facility] approximately 3 weeks ago after and approximately 8 day admission  for possible viral meningitis but no distinct cause found.      In the ED she was afebrile with heart rate into the 120s and respiratory rate in the low 20s.  She  was satting 100% on room air.  Blood pressures have remained in the 110s/70s.  No increased JVP.  Initial lab workup revealed WBC 18.9, hemoglobin 9, D-dimer 2.17, creatinine 0.7, BNP 99, negative troponin.  Lactic acid 1.4.  UA positive for nitrites with 3 WBC.  CTA of the chest negative for PE but does reveal moderate pericardial effusion without evidence for cardiac mass.  Maximum thickness is 22 mm.  She was given 1 L normal saline bolus on admission. She was then transferred to [facility] for cardiac evaluation for pericardial effusion.       Hospital Course:  Patient admitted for evaluation of possible COVID-19. COVID-19 was not detected. ID and Cardiology were consulted for assistance in regards to pericardial effusion. Cardiology deemed patient's TTE was reassuring and patient could follow with close ambulatory follow up. ID recommended additional viral serologies but otherwise agreed with cardiology's POC.     Interval History: NAEO. Patient was afebrile and HDS. Patient's SpO2 was stable on room air. Patient reports symptomatic improvement and denies SOB or chest tightness     Review of Systems:  Respiratory: Negative for cough, SOB, or chest tightness  Cardiovascular: Negative for chest pain, palpitations, or LE edema   GI: Negative for N/V/D or abdominal pain    Vitals:    04/04/20 0600 04/04/20 0708 04/04/20 0907 04/04/20 1117   BP:   111/70    BP Location:   Left arm    Patient Position:   Lying    Pulse: 106 102 (!) 111 98   Resp:   16    Temp:   97.7 °F (36.5 °C)    TempSrc:   Oral    SpO2:   95%    Weight:       Height:         Physical Exam:  GEN: NAD, conversant  Resp: CTABL, no wheezes or rales, normal work of breathing   CV: RRR, no m/r/g, no edema  GI: soft, NTND  Skin: no rash    Laboratory Values:  Lab Results   Component Value  Date    [id number] Not Detected 04/02/2020       Recent Labs   Lab 03/31/20  1059 04/02/20  1425 04/04/20  1010   WBC 7.80 18.90* 7.34   LYMPH 17.2*  1.3 5.3*  1.0 9.4*  0.7*   HGB 9.1* 9.0* 8.3*   HCT 26.5* 27.1* 26.6*   * 495* 393*     Recent Labs   Lab 04/02/20  1425   *   K 3.8   CL 99*   CO2 25   BUN 11   CREATININE 0.7   *   CALCIUM 8.9     Recent Labs   Lab 04/02/20  1425   ALKPHOS 102   ALT 13*   AST 12*   ALBUMIN 3.2*   PROT 7.4   BILITOT 0.9   INR 1.0        Recent Labs     04/02/20  1425 04/03/20  0919   DDIMER 2.17*  --    FERRITIN  --  220   CRP  --  221.7*   LDH  --  224   BNP 99  --    TROPONINI <0.01  --    CPK  --  8*         Microbiology:  Microbiology Results (last 7 days)     Procedure Component Value Units Date/Time    Respiratory Infection Panel (PCR), Nasopharyngeal [[id number]] Collected:  04/04/20 1051    Order Status:  Sent Specimen:  Nasopharyngeal Swab Updated:  04/04/20 1121    Respiratory Infection Panel (PCR), Nasopharyngeal [[id number]]     Order Status:  Canceled Specimen:  Nasopharyngeal Swab           Imaging:   No orders to display           Cardiac:    Results for orders placed during the hospital encounter of 04/03/20   Echo Color Flow Doppler? Yes    Narrative · Normal left ventricular systolic function. The estimated ejection   fraction is 65%.  · No wall motion abnormalities.  · Normal LV diastolic function.  · Normal right ventricular systolic function.  · The estimated PA systolic pressure is 36 mmHg.  · Elevated central venous pressure (15 mmHg).  · Moderate circumferential pericardial effusion with max diameter 1.7cm   located laterally. Effusion is fibrinous. There is no diastolic collapse   of the RA/RV. No significant respiratory flow variation. The IVC is large   and does not collapse with respiration. Would recommend continued   monitoring.  · There is annulus paradoxus present, but septal wall motion does not   change with respiration. No  expiratory diastolic flow reversal in the   hepatic veins.            Procedures:   * No surgery found *        Current Discharge Medication List      CONTINUE these medications which have NOT CHANGED    Details   ferrous sulfate (FEOSOL) 325 mg (65 mg iron) Tab tablet Take 1 tablet (325 mg total) by mouth 2 (two) times daily.    Associated Diagnoses: Iron deficiency anemia, unspecified iron deficiency anemia type      norethindrone-ethinyl estradiol-iron (ESTROSTEP FE) 1-20(5)/1-30(7) /1mg-35mcg (9) Tab Take by mouth once daily.      pantoprazole (PROTONIX) 40 MG tablet Take 1 tablet (40 mg total) by mouth once daily.  Qty: 30 tablet, Refills: 0      azithromycin (Z-JEFF) 250 MG tablet Take 1 tablet by mouth once daily.      butalbital-acetaminophen-caffeine -40 mg (FIORICET, ESGIC) -40 mg per tablet Take 1 tablet by mouth every 6 (six) hours as needed for Headaches.  Qty: 20 tablet, Refills: 0               Discharge Diet:regular diet with Normal Fluid intake of 1500 - 2000 mL per day    Activity: activity as tolerated    Discharge Condition: Good    Disposition: Home or Self Care    Follow up:  No future appointments.      Tests pending at the time of discharge: none        Time spent  on the discharge of the patient including review of hospital course with the patient. reviewing discharge medications and arranging follow-up care: 60 mins    Discharge examination: Patient was seen and examined on the date of discharge and determined to be suitable for discharge.    iJm Cormier MD/MS  [facility]   Internal Medicine, PGY-2

## 2020-04-06 ENCOUNTER — OFFICE VISIT (OUTPATIENT)
Dept: PRIMARY CARE CLINIC | Facility: CLINIC | Age: 30
End: 2020-04-06
Payer: COMMERCIAL

## 2020-04-06 DIAGNOSIS — D50.9 IRON DEFICIENCY ANEMIA, UNSPECIFIED IRON DEFICIENCY ANEMIA TYPE: ICD-10-CM

## 2020-04-06 DIAGNOSIS — A87.9 VIRAL MENINGITIS: ICD-10-CM

## 2020-04-06 DIAGNOSIS — R79.82 CRP ELEVATED: ICD-10-CM

## 2020-04-06 DIAGNOSIS — I31.39 PERICARDIAL EFFUSION: Primary | ICD-10-CM

## 2020-04-06 LAB — HIV 1+2 AB+HIV1 P24 AG SERPL QL IA: NEGATIVE

## 2020-04-06 PROCEDURE — 99213 OFFICE O/P EST LOW 20 MIN: CPT | Mod: 95,,, | Performed by: FAMILY MEDICINE

## 2020-04-06 PROCEDURE — 99213 PR OFFICE/OUTPT VISIT, EST, LEVL III, 20-29 MIN: ICD-10-PCS | Mod: 95,,, | Performed by: FAMILY MEDICINE

## 2020-04-06 NOTE — PROGRESS NOTES
Subjective:       Patient ID: Melinda Parekh is a 29 y.o. female.    Chief Complaint: No chief complaint on file.    HPI:The patient location is:  home  The chief complaint leading to consultation is:  Pericardial effusion shortness of breath  Visit type: Virtual visit with synchronous audio and video  Total time spent with patient:  20 min  Each patient to whom he or she provides medical services by telemedicine is:  (1) informed of the relationship between the physician and patient and the respective role of any other health care provider with respect to management of the patient; and (2) notified that he or she may decline to receive medical services by telemedicine and may withdraw from such care at any time.    Notes:  History of present illness 29-year-old female--patient went to the emergency room 04/02/2020--chest pain/tachycardia/shortness of breath/pericardial effusion---chest x-ray show improvement of the bilateral infiltrate with persistent mild cardiomegaly so you  CT a showed moderate pericardial effusion  Echocardiogram showed moderate circumferential pericardial effusion ejection fraction 65% suggest further monitoring  Yesterday was a good day--today a little short of breath---gets short of breath even with walking--patient was tested for COVID and was negative.  Patient has a watch did monitors pulse--was 119 10 min ago.    Lab from emergency room reviewed initial WBC 18.9 repeated 2 days later 7.34 hematocrit initially 27.12 days later 26.6 patient on 2 iron pills per day wants to take 1 iron pill per day .7 showing some inflammation--states in the emergency room cardiologist reviewing the echocardiogram requested patient ever repeat echocardiogram Friday will repeat on Thursday and have patient see Dr. Fair on Friday or Monday--Friday is good Friday    Notes:  Office visit 04/01/2020 telemedicine History of present illness 29-year-old female--03/03/2020 patient was admitted for viral  meningitis--was in the hospital for 8 days--seen by Dr. Patterson after discharge..  Had baby January 3rd 2019---patient had a anemia during the hospitalization hematocrit ranged from 24 29 had lab yesterday with a white count of 7.8 which is excellent was running in the [id number] range hematocrit 26.5 patient is been on iron--had extensive workup in the hospital including an HEATHER rheumatoid factor Ebstein Ruby all were negative haptoglobin was elevated at 288 sed rate was elevated 104  reticulocyte count was decreased 2.4 iron level decreased at 14 fair--patient does not recall losing any significant amount of blood during the delivery.     ROS:  Skin: no psoriasis, eczema, skin cancer  HEENT: No headache, ocular pain, blurred vision, diplopia, epistaxis, hoarseness change in voice, thyroid trouble  Lung: No pneumonia, asthma, Tb, wheezing, SOB, no smoking +shortness breath with exertion  Heart: + chest pain--some soreness especially with waking up in the morning patient has to sit up-propped up in bed so sore 1 wakes up in the,no ankle edema, palpitations, MI, justyn murmur, hypertension, hyperlipidemia ---no stent bypass arrhythmia  Abdomen: No nausea, vomiting, diarrhea, constipation, ulcers, hepatitis, gallbladder disease, melena, hematochezia, hematemesis  : no UTI, renal disease, stonesgYN LMP now   MS: no fractures, O/A, lupus, rheumatoid, gout  Neuro: No dizziness, LOC, seizures history of viral meningitis  No diabetes, + iron deficiency anemia, no anxiety, no depression   --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    Objective:   Physical Exam:  No physical exam was done this is a telemedicine visit so physical blow as part of my normal physical it was not done  General: Well nourished, well developed, no acute distress  Skin: No lesions  HEENT: Eyes PERRLA, EOM intact, nose patent, throat non-erythematous   NECK: Supple, no bruits, No JVD, no nodes  Lungs: Clear, no rales,  rhonchi, wheezing  Heart: Regular rate and rhythm, no murmurs, gallops, or rubs  Abdomen: flat, bowel sounds positive, no tenderness, or organomegaly  MS: Range of motion and muscle strength intact  Neuro: Alert, CN intact, oriented X 3  Extremities: No cyanosis, clubbing, or edema         Assessment:       1. Pericardial effusion    2. Viral meningitis    3. Iron deficiency anemia, unspecified iron deficiency anemia type    4. CRP elevated        Plan:       Pericardial effusion  -     Echo Color Flow Doppler? Yes  -     CBC auto differential; Future; Expected date: 04/06/2020  -     Comprehensive metabolic panel; Future; Expected date: 04/06/2020  -     Reticulocytes; Future; Expected date: 04/06/2020  -     Iron and TIBC; Future; Expected date: 04/06/2020    Viral meningitis    Iron deficiency anemia, unspecified iron deficiency anemia type  -     CBC auto differential; Future; Expected date: 04/06/2020  -     Comprehensive metabolic panel; Future; Expected date: 04/06/2020  -     Reticulocytes; Future; Expected date: 04/06/2020  -     Iron and TIBC; Future; Expected date: 04/06/2020    CRP elevated       recent episode of meningitis---recently seen in clinic and sent to the emergency room due to shortness of breath--WBC 18.9 hematocrit 27.1 initially 2 days later WBC 7.34 hematocrit 26.6 .7 echocardiogram 4 3 2020 showed moderate pericardial effusion echocardiogram showed ejection fraction of 65% with circumferential pericardial effusion patient Friday however is good Friday will see if hospital is doing echocardiograms--if so will schedule it for Friday--appoint with Dr. Fair for Monday.  Discuss long-term treatment plan a pericardial effusion--patient may need pericardial window  Will redo lab in 2 weeks CBCs CMP reticulocyte count CRP then will do it in 6 weeks then 3 months patient told to continue iron

## 2020-04-06 NOTE — PLAN OF CARE
Pt d/c home with family.    Future Appointments   Date Time Provider Department Center   4/6/2020 11:15 AM Des Maya MD [facility]        04/06/20 0824   Final Note   Assessment Type Final Discharge Note   Anticipated Discharge Disposition Home   Hospital Follow Up  Appt(s) scheduled? Yes   Discharge plans and expectations educations in teach back method with documentation complete? Yes     Julie Haase RN  Case Management [phone]

## 2020-04-09 LAB
AORTIC ROOT ANNULUS: 2.22 CM
AORTIC VALVE CUSP SEPERATION: 1.4 CM
CV ECHO LV RWT: 0.47 CM
DOP CALC LVOT AREA: 1.8 CM2
DOP CALC LVOT DIAMETER: 1.52 CM
E WAVE DECELERATION TIME: 214.25 MSEC
E/A RATIO: 2.3
ECHO LV POSTERIOR WALL: 0.95 CM (ref 0.6–1.1)
FRACTIONAL SHORTENING: 33 % (ref 28–44)
INTERVENTRICULAR SEPTUM: 1.16 CM (ref 0.6–1.1)
LEFT ATRIUM SIZE: 3.34 CM
LEFT INTERNAL DIMENSION IN SYSTOLE: 2.72 CM (ref 2.1–4)
LEFT VENTRICLE DIASTOLIC VOLUME: 71.65 ML
LEFT VENTRICLE SYSTOLIC VOLUME: 27.45 ML
LEFT VENTRICULAR INTERNAL DIMENSION IN DIASTOLE: 4.04 CM (ref 3.5–6)
LEFT VENTRICULAR MASS: 139.27 G
MV PEAK A VEL: 0.54 M/S
MV PEAK E VEL: 1.24 M/S
RIGHT VENTRICULAR END-DIASTOLIC DIMENSION: 2.19 CM

## 2020-04-10 PROBLEM — I07.1 MODERATE TRICUSPID REGURGITATION: Status: ACTIVE | Noted: 2020-04-10

## 2020-04-13 ENCOUNTER — OFFICE VISIT (OUTPATIENT)
Dept: CARDIOLOGY | Facility: CLINIC | Age: 30
End: 2020-04-13
Payer: COMMERCIAL

## 2020-04-13 DIAGNOSIS — I31.39 PERICARDIAL EFFUSION: ICD-10-CM

## 2020-04-13 DIAGNOSIS — I31.9 PERICARDITIS, UNSPECIFIED CHRONICITY, UNSPECIFIED TYPE: ICD-10-CM

## 2020-04-13 PROCEDURE — 99203 OFFICE O/P NEW LOW 30 MIN: CPT | Mod: 95,,, | Performed by: INTERNAL MEDICINE

## 2020-04-13 PROCEDURE — 99203 PR OFFICE/OUTPT VISIT, NEW, LEVL III, 30-44 MIN: ICD-10-PCS | Mod: 95,,, | Performed by: INTERNAL MEDICINE

## 2020-04-13 NOTE — LETTER
April 13, 2020      Des Maya MD  [address] [place] [address]           [facility]  [address], [facility] [address]  [place] [address]  Phone: [phone]  Fax: [phone]          Patient: Melinda Parekh   MR Number: [id number]   YOB: 1990   Date of Visit: 4/13/2020       Dear Dr. Des Maya:    Thank you for referring Melinda Parekh to me for evaluation. Attached you will find relevant portions of my assessment and plan of care.    If you have questions, please do not hesitate to call me. I look forward to following Melinda Parekh along with you.    Sincerely,    Zhang Fair MD    Enclosure  CC:  No Recipients    If you would like to receive this communication electronically, please contact [email or web address] or [phone] to request more information on [employer] access.    For providers and/or their staff who would like to refer a patient to [facility], please contact us through our one-stop-shop provider referral line, [facility] [place], at [phone].    If you feel you have received this communication in error or would no longer like to receive these types of communications, please e-mail [email or web address]

## 2020-04-13 NOTE — PROGRESS NOTES
Subjective:    Patient ID:  Melinda Parekh is a 29 y.o. y.o. female who presents for initial visit for No chief complaint on file.  The patient location is: HOME  The chief complaint leading to consultation is: Acute Pericarditis  Visit type: Virtual visit with synchronous audio and video  Total time spent with patient:30 mins  Each patient to whom he or she provides medical services by telemedicine is:  (1) informed of the relationship between the physician and patient and the respective role of any other health care provider with respect to management of the patient; and (2) notified that he or she may decline to receive medical services by telemedicine and may withdraw from such care at any time.    Notes:   This 29-year-old female had a normal spontaneous vaginal delivery on January 3, 2020.  On February 24, 2020 patient started complaining of flu-like symptoms.  She had fevers of 100.5.  On February 27 the patient went to the emergency room for flu-like symptoms.  A CK was drawn which was 29.  Patient return to the emergency room on March 3rd with persistent fever.Admitted and CSF studies most concerning for bacterial meningitis. 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 CSF culture no growth and blood cultures negative. Had repeat fevers on 03/06; discussed with ID, suspect likely viral given broad spectrum antibiotic use, but will continue empiric treatment for bacterial for a total 10 day course. Had some abdominal pain and mild transaminitis on presentation. U/S with hepatosplenomegaly. Discussed with GI and will follow-up in clinic. She continued to have low grade fever but this improved as well, although she continued to have elevated temperature associated with sweats and tachycardia. All viral and bacterial studies were negative. She had slow improvement in symptoms and received 8 days of empiric Rocephin. She continued to have leukocytosis without etiology.  Rheumatologic studies were ordered but pending at the time of discharge. She is stable for discharge home today and will f/u with her PCP for f/u on blood tests. Given the acuity of her symptoms, its unlikely that her symptoms are rheumatologic.  On April 2nd the patient presented to [facility] Emergency room with complain of chest pain and a CT angiogram of her chest revealed a moderate pericardial effusion measuring 2.2 cm on CT by the report.  Patient was then transferred to [facility].  Patient was treated for acute pericarditis at [facility] and discharged home on colchicine and ibuprofen.  She had a repeat echocardiogram on April 9th 1 week after the initial echocardiogram and her pericardial effusion has improved dramatically.  She is no longer having any fever or chest pain.      Past Medical History:   Diagnosis Date    H/O elbow surgery     Oral contraceptive use         Social History     Socioeconomic History    Marital status:      Spouse name: Not on file    Number of children: Not on file    Years of education: Not on file    Highest education level: Not on file   Occupational History     Employer: [employer]   Social Needs    Financial resource strain: Not on file    Food insecurity:     Worry: Not on file     Inability: Not on file    Transportation needs:     Medical: Not on file     Non-medical: Not on file   Tobacco Use    Smoking status: Never Smoker    Smokeless tobacco: Never Used   Substance and Sexual Activity    Alcohol use: Yes    Drug use: No    Sexual activity: Yes     Partners: Male   Lifestyle    Physical activity:     Days per week: Not on file     Minutes per session: Not on file    Stress: Not on file   Relationships    Social connections:     Talks on phone: Not on file     Gets together: Not on file     Attends Sikhism service: Not on file     Active member of club or organization: Not on file     Attends meetings of  clubs or organizations: Not on file     Relationship status: Not on file   Other Topics Concern    Are you pregnant or think you may be? Not Asked    Breast-feeding Not Asked   Social History Narrative    Not on file        Family History   Problem Relation Age of Onset    No Known Problems Maternal Grandmother     No Known Problems Sister     Heart attack Maternal Grandfather     No Known Problems Paternal Grandmother     No Known Problems Paternal Grandfather     Skin cancer Other     Breast cancer Other     Colon cancer Neg Hx     Ovarian cancer Neg Hx         Review of Systems   Cardiovascular:        Patient recently experienced pericarditis.   Genitourinary:        Patient recently had a normal spontaneous vaginal delivery in January 2020.   Neurological:        Patient had viral meningitis in February 2020.        Objective:     There were no vitals taken for this visit.    Physical Exam Telelmedicine    Labs:     Lab Results   Component Value Date     (L) 04/02/2020    K 3.8 04/02/2020    CL 99 (L) 04/02/2020    CO2 25 04/02/2020    BUN 11 04/02/2020    CREATININE 0.7 04/02/2020    ANIONGAP 11 04/02/2020     No results found for: HGBA1C  Lab Results   Component Value Date    BNP 99 04/02/2020       Lab Results   Component Value Date    WBC 7.34 04/04/2020    HGB 8.3 (L) 04/04/2020    HCT 26.6 (L) 04/04/2020     (H) 04/04/2020    GRAN 6.0 04/04/2020    GRAN 82.0 (H) 04/04/2020     No results found for: CHOL, HDL, LDLCALC, TRIG      Results for orders placed or performed during the hospital encounter of 04/02/20   EKG 12-lead    Collection Time: 04/02/20  2:16 PM    Narrative    Test Reason : R00.0,    Vent. Rate : 126 BPM     Atrial Rate : 126 BPM     P-R Int : 122 ms          QRS Dur : 072 ms      QT Int : 296 ms       P-R-T Axes : 062 024 025 degrees     QTc Int : 428 ms    Sinus tachycardia  Otherwise normal ECG  When compared with ECG of 02-APR-2020 13:29,  Nonspecific T wave  abnormality, improved in Lateral leads  Confirmed by Sacha Au MD ([id number]) on 4/3/2020 8:31:02 AM    Referred By: [email or web address]   SELF           Confirmed By:Sacha Au MD        .  Meds:     Current Outpatient Medications:     colchicine (COLCRYS) 0.6 mg tablet, Take 1 tablet (0.6 mg total) by mouth once daily., Disp: 30 tablet, Rfl: 2    ferrous sulfate (FEOSOL) 325 mg (65 mg iron) Tab tablet, Take 1 tablet (325 mg total) by mouth 2 (two) times daily., Disp: , Rfl:     ibuprofen (ADVIL,MOTRIN) 600 MG tablet, Take 1 tablet (600 mg total) by mouth 3 (three) times daily for 14 days, THEN 0.5 tablets (300 mg total) 3 (three) times daily for 14 days., Disp: 63 tablet, Rfl: 0    norethindrone-ethinyl estradiol-iron (ESTROSTEP FE) 1-20(5)/1-30(7) /1mg-35mcg (9) Tab, Take by mouth once daily., Disp: , Rfl:     pantoprazole (PROTONIX) 40 MG tablet, Take 1 tablet (40 mg total) by mouth once daily., Disp: 30 tablet, Rfl: 0      Assessment & Plan:     Recovering viral pericarditis.    I reviewed the EKGs of April 2020 and the echo cardiograms times 02 there has been a dramatic improvement of the posterior and anterior pericardial effusion from April 3rd to April 9.  The patient has been on colchicine 0.6 mg p.o. q.day and ibuprofen 200 mg 3 times a day as prescribed.  It appears that this patient suffered a viral illness after delivery of her child which eventually affected her meninges and her pericardium even though no actual virus was isolated.  A telemedicine consult with her today was in reference to being persistent about taking her medications to prevent constrictive pericarditis.  At this time I recommended 90 days of treatment which she has already started however this may be reduced .An echocardiogram will be repeated in 30 days.  She will see me in 30 days either via telemedicine or physically.

## 2020-05-22 ENCOUNTER — PATIENT MESSAGE (OUTPATIENT)
Dept: PRIMARY CARE CLINIC | Facility: CLINIC | Age: 30
End: 2020-05-22

## 2020-06-03 ENCOUNTER — PATIENT MESSAGE (OUTPATIENT)
Dept: OBSTETRICS AND GYNECOLOGY | Facility: CLINIC | Age: 30
End: 2020-06-03

## 2020-06-03 DIAGNOSIS — L65.9 HAIR LOSS: Primary | ICD-10-CM

## 2020-11-07 ENCOUNTER — PATIENT MESSAGE (OUTPATIENT)
Dept: OBSTETRICS AND GYNECOLOGY | Facility: CLINIC | Age: 30
End: 2020-11-07

## 2020-11-09 RX ORDER — NORGESTIMATE AND ETHINYL ESTRADIOL 7DAYSX3 LO
1 KIT ORAL DAILY
Qty: 84 TABLET | Refills: 3 | Status: SHIPPED | OUTPATIENT
Start: 2020-11-09 | End: 2021-02-24 | Stop reason: SDUPTHER

## 2020-12-14 ENCOUNTER — OFFICE VISIT (OUTPATIENT)
Dept: PRIMARY CARE CLINIC | Facility: CLINIC | Age: 30
End: 2020-12-14
Payer: COMMERCIAL

## 2020-12-14 VITALS
RESPIRATION RATE: 14 BRPM | TEMPERATURE: 98 F | BODY MASS INDEX: 22.81 KG/M2 | HEIGHT: 63 IN | WEIGHT: 128.75 LBS | SYSTOLIC BLOOD PRESSURE: 124 MMHG | DIASTOLIC BLOOD PRESSURE: 78 MMHG | OXYGEN SATURATION: 99 % | HEART RATE: 72 BPM

## 2020-12-14 DIAGNOSIS — B02.9 HERPES ZOSTER WITHOUT COMPLICATION: Primary | ICD-10-CM

## 2020-12-14 PROCEDURE — 1126F PR PAIN SEVERITY QUANTIFIED, NO PAIN PRESENT: ICD-10-PCS | Mod: S$GLB,,, | Performed by: NURSE PRACTITIONER

## 2020-12-14 PROCEDURE — 1126F AMNT PAIN NOTED NONE PRSNT: CPT | Mod: S$GLB,,, | Performed by: NURSE PRACTITIONER

## 2020-12-14 PROCEDURE — 99214 PR OFFICE/OUTPT VISIT, EST, LEVL IV, 30-39 MIN: ICD-10-PCS | Mod: S$GLB,,, | Performed by: NURSE PRACTITIONER

## 2020-12-14 PROCEDURE — 3008F PR BODY MASS INDEX (BMI) DOCUMENTED: ICD-10-PCS | Mod: CPTII,S$GLB,, | Performed by: NURSE PRACTITIONER

## 2020-12-14 PROCEDURE — 99214 OFFICE O/P EST MOD 30 MIN: CPT | Mod: S$GLB,,, | Performed by: NURSE PRACTITIONER

## 2020-12-14 PROCEDURE — 3008F BODY MASS INDEX DOCD: CPT | Mod: CPTII,S$GLB,, | Performed by: NURSE PRACTITIONER

## 2020-12-14 PROCEDURE — 99999 PR PBB SHADOW E&M-EST. PATIENT-LVL III: ICD-10-PCS | Mod: PBBFAC,,, | Performed by: NURSE PRACTITIONER

## 2020-12-14 PROCEDURE — 99999 PR PBB SHADOW E&M-EST. PATIENT-LVL III: CPT | Mod: PBBFAC,,, | Performed by: NURSE PRACTITIONER

## 2020-12-14 RX ORDER — VALACYCLOVIR HYDROCHLORIDE 1 G/1
1000 TABLET, FILM COATED ORAL EVERY 8 HOURS
Qty: 21 TABLET | Refills: 0 | Status: SHIPPED | OUTPATIENT
Start: 2020-12-14 | End: 2021-02-24

## 2020-12-14 NOTE — PROGRESS NOTES
Chief Complaint  Chief Complaint   Patient presents with    Insect Bite     causing pain on left side rib cage x 1 week       HPI    Melinda Parekh is a 30 y.o. female that presents for insect bite.    Patient reports the onset of pain and swelling, erythema to her left thoracic ribcage approximately 1 week ago.  Initially itching has since a come very tender to touch.  This time she noted several small lesions to her left rib cage which have since scabbed begun healing.  Continues with pain in this site described as stabbing, shooting, radiating posteriorly to her left.  Questions whether not she was bit by an insect.  No noted insect visualized.  Does report that she has been through lot this year in terms of her health including 1 episode of meningitis and pericarditis.  Currently taking a multivitamin and feeling well although she is not sleeping well due to an 11-month-old at home.    PAST MEDICAL HISTORY:  Past Medical History:   Diagnosis Date    H/O elbow surgery     Oral contraceptive use        PAST SURGICAL HISTORY:  Past Surgical History:   Procedure Laterality Date    left elbow Left 03/30/2017    torn ligament       SOCIAL HISTORY:  Social History     Socioeconomic History    Marital status:      Spouse name: Not on file    Number of children: Not on file    Years of education: Not on file    Highest education level: Not on file   Occupational History     Employer: [employer]   Social Needs    Financial resource strain: Not on file    Food insecurity     Worry: Not on file     Inability: Not on file    Transportation needs     Medical: Not on file     Non-medical: Not on file   Tobacco Use    Smoking status: Never Smoker    Smokeless tobacco: Never Used   Substance and Sexual Activity    Alcohol use: Yes     Comment: occasionally     Drug use: No    Sexual activity: Yes     Partners: Male   Lifestyle    Physical activity     Days per week: Not on file     Minutes per  session: Not on file    Stress: Not on file   Relationships    Social connections     Talks on phone: Not on file     Gets together: Not on file     Attends Druze service: Not on file     Active member of club or organization: Not on file     Attends meetings of clubs or organizations: Not on file     Relationship status: Not on file   Other Topics Concern    Are you pregnant or think you may be? Not Asked    Breast-feeding Not Asked   Social History Narrative    Not on file       FAMILY HISTORY:  Family History   Problem Relation Age of Onset    No Known Problems Maternal Grandmother     No Known Problems Sister     Heart attack Maternal Grandfather     No Known Problems Paternal Grandmother     No Known Problems Paternal Grandfather     Skin cancer Other     Breast cancer Other     Colon cancer Neg Hx     Ovarian cancer Neg Hx        ALLERGIES AND MEDICATIONS: updated and reviewed.  Review of patient's allergies indicates:   Allergen Reactions    No known drug allergies      Current Outpatient Medications   Medication Sig Dispense Refill    multivitamin capsule Take 1 capsule by mouth.      norgestimate-ethinyl estradioL (ORTHO TRI-CYCLEN LO) 0.18/0.215/0.25 mg-25 mcg tablet Take 1 tablet by mouth once daily. 84 tablet 3    valACYclovir (VALTREX) 1000 MG tablet Take 1 tablet (1,000 mg total) by mouth every 8 (eight) hours. for 7 days 21 tablet 0     No current facility-administered medications for this visit.          ROS  Review of Systems   Constitutional: Negative for chills and fever.   HENT: Negative for ear pain, postnasal drip and sinus pain.    Respiratory: Negative for cough and shortness of breath.    Cardiovascular: Negative for chest pain.   Gastrointestinal: Negative for diarrhea, nausea and vomiting.   Skin: Positive for rash and wound.           PHYSICAL EXAM  Vitals:    12/14/20 1132   BP: 124/78   BP Location: Left arm   Patient Position: Sitting   BP Method: Medium (Manual)  "  Pulse: 72   Resp: 14   Temp: 97.9 °F (36.6 °C)   TempSrc: Oral   SpO2: 99%   Weight: 58.4 kg (128 lb 12 oz)   Height: 5' 3" (1.6 m)    Body mass index is 22.81 kg/m².  Weight: 58.4 kg (128 lb 12 oz)   Height: 5' 3" (160 cm)     Physical Exam  Constitutional:       Appearance: She is well-developed.   HENT:      Head: Normocephalic.      Right Ear: Tympanic membrane normal.      Left Ear: Tympanic membrane normal.      Mouth/Throat:      Pharynx: Uvula midline.   Eyes:      Conjunctiva/sclera: Conjunctivae normal.   Cardiovascular:      Rate and Rhythm: Normal rate and regular rhythm.      Pulses: Normal pulses.           Radial pulses are 2+ on the right side and 2+ on the left side.      Heart sounds: Normal heart sounds. No murmur.      Comments: No LE swelling noted  Pulmonary:      Effort: Pulmonary effort is normal.      Breath sounds: Normal breath sounds. No wheezing.   Abdominal:      General: Bowel sounds are normal.      Palpations: Abdomen is soft.      Tenderness: There is no abdominal tenderness.   Lymphadenopathy:      Cervical: No cervical adenopathy.   Skin:     General: Skin is warm and dry.      Findings: Rash present.          Neurological:      Mental Status: She is alert and oriented to person, place, and time.           Health Maintenance       Date Due Completion Date    Lipid Panel 1988 ---    Influenza Vaccine (1) 08/01/2020 9/25/2019    Cervical Cancer Screening 03/20/2022 3/20/2019    TETANUS VACCINE 11/07/2029 11/7/2019            Assessment & Plan    Problem List Items Addressed This Visit     None      Visit Diagnoses     Herpes zoster without complication    -  Primary    Relevant Medications    multivitamin capsule    valACYclovir (VALTREX) 1000 MG tablet    Lesions well healed.  Scabbed.  Do not appear to be spreading.  Will treat with Valtrex in light of the fact that she has an unvaccinated child at home.  Keep lesion covered.          Follow-up: Follow up if symptoms " worsen or fail to improve.    Dori Noel    Medication List with Changes/Refills   New Medications    VALACYCLOVIR (VALTREX) 1000 MG TABLET    Take 1 tablet (1,000 mg total) by mouth every 8 (eight) hours. for 7 days   Current Medications    MULTIVITAMIN CAPSULE    Take 1 capsule by mouth.    NORGESTIMATE-ETHINYL ESTRADIOL (ORTHO TRI-CYCLEN LO) 0.18/0.215/0.25 MG-25 MCG TABLET    Take 1 tablet by mouth once daily.   Discontinued Medications    COLCHICINE (COLCRYS) 0.6 MG TABLET    Take 1 tablet (0.6 mg total) by mouth once daily.    FERROUS SULFATE (FEOSOL) 325 MG (65 MG IRON) TAB TABLET    Take 1 tablet (325 mg total) by mouth 2 (two) times daily.    PANTOPRAZOLE (PROTONIX) 40 MG TABLET    TAKE 1 TABLET(40 MG TOTAL) BY MOUTH ONCE DAILY

## 2021-02-22 ENCOUNTER — PATIENT OUTREACH (OUTPATIENT)
Dept: ADMINISTRATIVE | Facility: OTHER | Age: 31
End: 2021-02-22

## 2021-02-24 ENCOUNTER — OFFICE VISIT (OUTPATIENT)
Dept: OBSTETRICS AND GYNECOLOGY | Facility: CLINIC | Age: 31
End: 2021-02-24
Attending: OBSTETRICS & GYNECOLOGY
Payer: COMMERCIAL

## 2021-02-24 VITALS — WEIGHT: 129 LBS | BODY MASS INDEX: 22.86 KG/M2 | HEIGHT: 63 IN

## 2021-02-24 DIAGNOSIS — Z01.419 ENCOUNTER FOR GYNECOLOGICAL EXAMINATION (GENERAL) (ROUTINE) WITHOUT ABNORMAL FINDINGS: Primary | ICD-10-CM

## 2021-02-24 PROCEDURE — 1126F PR PAIN SEVERITY QUANTIFIED, NO PAIN PRESENT: ICD-10-PCS | Mod: S$GLB,,, | Performed by: OBSTETRICS & GYNECOLOGY

## 2021-02-24 PROCEDURE — 3008F PR BODY MASS INDEX (BMI) DOCUMENTED: ICD-10-PCS | Mod: CPTII,S$GLB,, | Performed by: OBSTETRICS & GYNECOLOGY

## 2021-02-24 PROCEDURE — 99999 PR PBB SHADOW E&M-EST. PATIENT-LVL III: CPT | Mod: PBBFAC,,, | Performed by: OBSTETRICS & GYNECOLOGY

## 2021-02-24 PROCEDURE — 99999 PR PBB SHADOW E&M-EST. PATIENT-LVL III: ICD-10-PCS | Mod: PBBFAC,,, | Performed by: OBSTETRICS & GYNECOLOGY

## 2021-02-24 PROCEDURE — 99395 PREV VISIT EST AGE 18-39: CPT | Mod: S$GLB,,, | Performed by: OBSTETRICS & GYNECOLOGY

## 2021-02-24 PROCEDURE — 1126F AMNT PAIN NOTED NONE PRSNT: CPT | Mod: S$GLB,,, | Performed by: OBSTETRICS & GYNECOLOGY

## 2021-02-24 PROCEDURE — 99395 PR PREVENTIVE VISIT,EST,18-39: ICD-10-PCS | Mod: S$GLB,,, | Performed by: OBSTETRICS & GYNECOLOGY

## 2021-02-24 PROCEDURE — 3008F BODY MASS INDEX DOCD: CPT | Mod: CPTII,S$GLB,, | Performed by: OBSTETRICS & GYNECOLOGY

## 2021-02-24 RX ORDER — NORGESTIMATE AND ETHINYL ESTRADIOL 7DAYSX3 LO
1 KIT ORAL DAILY
Qty: 84 TABLET | Refills: 3 | Status: SHIPPED | OUTPATIENT
Start: 2021-02-24 | End: 2021-06-21

## 2021-02-24 RX ORDER — ATENOLOL 25 MG/1
25 TABLET ORAL DAILY
COMMUNITY
Start: 2021-02-04 | End: 2021-04-27

## 2021-03-02 NOTE — TELEPHONE ENCOUNTER
----- Message from Gwendalyn Bosworth, MD sent at 3/2/2021 10:26 AM EST -----  Results reviewed-will discuss at scheduled appointment-no changes needed now Please review patient's labs. She wants to know if she has to continue the iron supplement. Patient with anemia.

## 2021-04-27 ENCOUNTER — OFFICE VISIT (OUTPATIENT)
Dept: PRIMARY CARE CLINIC | Facility: CLINIC | Age: 31
End: 2021-04-27
Payer: COMMERCIAL

## 2021-04-27 VITALS
HEART RATE: 102 BPM | WEIGHT: 128.75 LBS | DIASTOLIC BLOOD PRESSURE: 82 MMHG | SYSTOLIC BLOOD PRESSURE: 128 MMHG | OXYGEN SATURATION: 99 % | BODY MASS INDEX: 22.81 KG/M2 | HEIGHT: 63 IN | RESPIRATION RATE: 18 BRPM | TEMPERATURE: 98 F

## 2021-04-27 DIAGNOSIS — Z11.52 ENCOUNTER FOR SCREENING FOR COVID-19: ICD-10-CM

## 2021-04-27 DIAGNOSIS — J06.9 UPPER RESPIRATORY TRACT INFECTION, UNSPECIFIED TYPE: Primary | ICD-10-CM

## 2021-04-27 PROCEDURE — 1126F PR PAIN SEVERITY QUANTIFIED, NO PAIN PRESENT: ICD-10-PCS | Mod: S$GLB,,, | Performed by: FAMILY MEDICINE

## 2021-04-27 PROCEDURE — 3008F PR BODY MASS INDEX (BMI) DOCUMENTED: ICD-10-PCS | Mod: CPTII,S$GLB,, | Performed by: FAMILY MEDICINE

## 2021-04-27 PROCEDURE — U0003 INFECTIOUS AGENT DETECTION BY NUCLEIC ACID (DNA OR RNA); SEVERE ACUTE RESPIRATORY SYNDROME CORONAVIRUS 2 (SARS-COV-2) (CORONAVIRUS DISEASE [COVID-19]), AMPLIFIED PROBE TECHNIQUE, MAKING USE OF HIGH THROUGHPUT TECHNOLOGIES AS DESCRIBED BY CMS-2020-01-R: HCPCS | Performed by: FAMILY MEDICINE

## 2021-04-27 PROCEDURE — 99999 PR PBB SHADOW E&M-EST. PATIENT-LVL III: CPT | Mod: PBBFAC,,, | Performed by: FAMILY MEDICINE

## 2021-04-27 PROCEDURE — 99213 OFFICE O/P EST LOW 20 MIN: CPT | Mod: S$GLB,,, | Performed by: FAMILY MEDICINE

## 2021-04-27 PROCEDURE — 99999 PR PBB SHADOW E&M-EST. PATIENT-LVL III: ICD-10-PCS | Mod: PBBFAC,,, | Performed by: FAMILY MEDICINE

## 2021-04-27 PROCEDURE — 1126F AMNT PAIN NOTED NONE PRSNT: CPT | Mod: S$GLB,,, | Performed by: FAMILY MEDICINE

## 2021-04-27 PROCEDURE — U0005 INFEC AGEN DETEC AMPLI PROBE: HCPCS | Performed by: FAMILY MEDICINE

## 2021-04-27 PROCEDURE — 3008F BODY MASS INDEX DOCD: CPT | Mod: CPTII,S$GLB,, | Performed by: FAMILY MEDICINE

## 2021-04-27 PROCEDURE — 99213 PR OFFICE/OUTPT VISIT, EST, LEVL III, 20-29 MIN: ICD-10-PCS | Mod: S$GLB,,, | Performed by: FAMILY MEDICINE

## 2021-04-27 RX ORDER — BENZONATATE 200 MG/1
200 CAPSULE ORAL 3 TIMES DAILY PRN
Qty: 30 CAPSULE | Refills: 2 | Status: SHIPPED | OUTPATIENT
Start: 2021-04-27 | End: 2021-05-07

## 2021-04-29 ENCOUNTER — PATIENT MESSAGE (OUTPATIENT)
Dept: PRIMARY CARE CLINIC | Facility: CLINIC | Age: 31
End: 2021-04-29

## 2021-04-29 LAB — SARS-COV-2 RNA RESP QL NAA+PROBE: NOT DETECTED

## 2021-05-12 ENCOUNTER — OFFICE VISIT (OUTPATIENT)
Dept: PRIMARY CARE CLINIC | Facility: CLINIC | Age: 31
End: 2021-05-12
Payer: COMMERCIAL

## 2021-05-12 VITALS
BODY MASS INDEX: 23.09 KG/M2 | HEIGHT: 63 IN | OXYGEN SATURATION: 96 % | HEART RATE: 77 BPM | RESPIRATION RATE: 16 BRPM | DIASTOLIC BLOOD PRESSURE: 70 MMHG | SYSTOLIC BLOOD PRESSURE: 110 MMHG | WEIGHT: 130.31 LBS

## 2021-05-12 DIAGNOSIS — J03.90 ACUTE TONSILLITIS, UNSPECIFIED ETIOLOGY: Primary | ICD-10-CM

## 2021-05-12 LAB
CTP QC/QA: YES
CTP QC/QA: YES
HETEROPH AB SER QL: NEGATIVE
S PYO RRNA THROAT QL PROBE: NEGATIVE

## 2021-05-12 PROCEDURE — 87081 CULTURE SCREEN ONLY: CPT | Performed by: FAMILY MEDICINE

## 2021-05-12 PROCEDURE — 99999 PR PBB SHADOW E&M-EST. PATIENT-LVL III: CPT | Mod: PBBFAC,,, | Performed by: FAMILY MEDICINE

## 2021-05-12 PROCEDURE — 1125F AMNT PAIN NOTED PAIN PRSNT: CPT | Mod: S$GLB,,, | Performed by: FAMILY MEDICINE

## 2021-05-12 PROCEDURE — 86308 POCT INFECTIOUS MONONUCLEOSIS: ICD-10-PCS | Mod: QW,S$GLB,, | Performed by: FAMILY MEDICINE

## 2021-05-12 PROCEDURE — 3008F BODY MASS INDEX DOCD: CPT | Mod: CPTII,S$GLB,, | Performed by: FAMILY MEDICINE

## 2021-05-12 PROCEDURE — 87880 POCT RAPID STREP A: ICD-10-PCS | Mod: QW,S$GLB,, | Performed by: FAMILY MEDICINE

## 2021-05-12 PROCEDURE — 1125F PR PAIN SEVERITY QUANTIFIED, PAIN PRESENT: ICD-10-PCS | Mod: S$GLB,,, | Performed by: FAMILY MEDICINE

## 2021-05-12 PROCEDURE — 87880 STREP A ASSAY W/OPTIC: CPT | Mod: QW,S$GLB,, | Performed by: FAMILY MEDICINE

## 2021-05-12 PROCEDURE — 99213 OFFICE O/P EST LOW 20 MIN: CPT | Mod: 25,S$GLB,, | Performed by: FAMILY MEDICINE

## 2021-05-12 PROCEDURE — 3008F PR BODY MASS INDEX (BMI) DOCUMENTED: ICD-10-PCS | Mod: CPTII,S$GLB,, | Performed by: FAMILY MEDICINE

## 2021-05-12 PROCEDURE — 99213 PR OFFICE/OUTPT VISIT, EST, LEVL III, 20-29 MIN: ICD-10-PCS | Mod: 25,S$GLB,, | Performed by: FAMILY MEDICINE

## 2021-05-12 PROCEDURE — 99999 PR PBB SHADOW E&M-EST. PATIENT-LVL III: ICD-10-PCS | Mod: PBBFAC,,, | Performed by: FAMILY MEDICINE

## 2021-05-12 PROCEDURE — 86308 HETEROPHILE ANTIBODY SCREEN: CPT | Mod: QW,S$GLB,, | Performed by: FAMILY MEDICINE

## 2021-05-12 RX ORDER — AMOXICILLIN 500 MG/1
500 CAPSULE ORAL 3 TIMES DAILY
Qty: 21 CAPSULE | Refills: 0 | Status: SHIPPED | OUTPATIENT
Start: 2021-05-12 | End: 2021-05-19

## 2021-05-15 LAB — BACTERIA THROAT CULT: NORMAL

## 2021-05-31 ENCOUNTER — TELEPHONE (OUTPATIENT)
Dept: OBSTETRICS AND GYNECOLOGY | Facility: CLINIC | Age: 31
End: 2021-05-31

## 2021-05-31 DIAGNOSIS — N91.2 ABSENT PERIODS: Primary | ICD-10-CM

## 2021-06-01 ENCOUNTER — LAB VISIT (OUTPATIENT)
Dept: LAB | Facility: HOSPITAL | Age: 31
End: 2021-06-01
Attending: OBSTETRICS & GYNECOLOGY
Payer: COMMERCIAL

## 2021-06-01 ENCOUNTER — PATIENT MESSAGE (OUTPATIENT)
Dept: OBSTETRICS AND GYNECOLOGY | Facility: CLINIC | Age: 31
End: 2021-06-01

## 2021-06-01 DIAGNOSIS — N91.2 ABSENT PERIODS: ICD-10-CM

## 2021-06-01 LAB — HCG INTACT+B SERPL-ACNC: <1.2 MIU/ML

## 2021-06-01 PROCEDURE — 84702 CHORIONIC GONADOTROPIN TEST: CPT | Performed by: OBSTETRICS & GYNECOLOGY

## 2021-06-03 ENCOUNTER — PATIENT MESSAGE (OUTPATIENT)
Dept: OBSTETRICS AND GYNECOLOGY | Facility: CLINIC | Age: 31
End: 2021-06-03

## 2021-06-21 ENCOUNTER — OFFICE VISIT (OUTPATIENT)
Dept: PRIMARY CARE CLINIC | Facility: CLINIC | Age: 31
End: 2021-06-21
Payer: COMMERCIAL

## 2021-06-21 VITALS
OXYGEN SATURATION: 99 % | WEIGHT: 133.19 LBS | RESPIRATION RATE: 16 BRPM | DIASTOLIC BLOOD PRESSURE: 80 MMHG | HEART RATE: 70 BPM | BODY MASS INDEX: 23.6 KG/M2 | SYSTOLIC BLOOD PRESSURE: 120 MMHG | HEIGHT: 63 IN

## 2021-06-21 DIAGNOSIS — R39.15 URINARY URGENCY: Primary | ICD-10-CM

## 2021-06-21 PROBLEM — R07.89 ATYPICAL CHEST PAIN: Status: RESOLVED | Noted: 2020-04-01 | Resolved: 2021-06-21

## 2021-06-21 LAB
BILIRUB SERPL-MCNC: NORMAL MG/DL
BLOOD URINE, POC: NORMAL
COLOR, POC UA: YELLOW
GLUCOSE UR QL STRIP: NORMAL
KETONES UR QL STRIP: NORMAL
LEUKOCYTE ESTERASE URINE, POC: NORMAL
NITRITE, POC UA: NORMAL
PH, POC UA: 7
PROTEIN, POC: NORMAL
SPECIFIC GRAVITY, POC UA: 1
UROBILINOGEN, POC UA: NORMAL

## 2021-06-21 PROCEDURE — 99213 OFFICE O/P EST LOW 20 MIN: CPT | Mod: 25,S$GLB,, | Performed by: FAMILY MEDICINE

## 2021-06-21 PROCEDURE — 99999 PR PBB SHADOW E&M-EST. PATIENT-LVL III: CPT | Mod: PBBFAC,,, | Performed by: FAMILY MEDICINE

## 2021-06-21 PROCEDURE — 1126F AMNT PAIN NOTED NONE PRSNT: CPT | Mod: S$GLB,,, | Performed by: FAMILY MEDICINE

## 2021-06-21 PROCEDURE — 99999 PR PBB SHADOW E&M-EST. PATIENT-LVL III: ICD-10-PCS | Mod: PBBFAC,,, | Performed by: FAMILY MEDICINE

## 2021-06-21 PROCEDURE — 99213 PR OFFICE/OUTPT VISIT, EST, LEVL III, 20-29 MIN: ICD-10-PCS | Mod: 25,S$GLB,, | Performed by: FAMILY MEDICINE

## 2021-06-21 PROCEDURE — 87086 URINE CULTURE/COLONY COUNT: CPT | Performed by: FAMILY MEDICINE

## 2021-06-21 PROCEDURE — 3008F PR BODY MASS INDEX (BMI) DOCUMENTED: ICD-10-PCS | Mod: CPTII,S$GLB,, | Performed by: FAMILY MEDICINE

## 2021-06-21 PROCEDURE — 81001 URINALYSIS AUTO W/SCOPE: CPT | Mod: S$GLB,,, | Performed by: FAMILY MEDICINE

## 2021-06-21 PROCEDURE — 3008F BODY MASS INDEX DOCD: CPT | Mod: CPTII,S$GLB,, | Performed by: FAMILY MEDICINE

## 2021-06-21 PROCEDURE — 81001 POCT URINALYSIS, DIPSTICK OR TABLET REAGENT, AUTOMATED, WITH MICROSCOP: ICD-10-PCS | Mod: S$GLB,,, | Performed by: FAMILY MEDICINE

## 2021-06-21 PROCEDURE — 1126F PR PAIN SEVERITY QUANTIFIED, NO PAIN PRESENT: ICD-10-PCS | Mod: S$GLB,,, | Performed by: FAMILY MEDICINE

## 2021-06-22 LAB — BACTERIA UR CULT: NO GROWTH

## 2021-10-15 NOTE — PROCEDURES
Procedures   Obstetrical ultrasound completed today.  See report in imaging section of [facility].   Statement Selected

## 2021-11-09 ENCOUNTER — PATIENT MESSAGE (OUTPATIENT)
Dept: OBSTETRICS AND GYNECOLOGY | Facility: CLINIC | Age: 31
End: 2021-11-09
Payer: COMMERCIAL

## 2021-12-02 ENCOUNTER — PATIENT MESSAGE (OUTPATIENT)
Dept: OBSTETRICS AND GYNECOLOGY | Facility: CLINIC | Age: 31
End: 2021-12-02
Payer: COMMERCIAL

## 2021-12-02 RX ORDER — CIPROFLOXACIN 500 MG/1
500 TABLET ORAL EVERY 12 HOURS
Qty: 14 TABLET | Refills: 0 | Status: SHIPPED | OUTPATIENT
Start: 2021-12-02 | End: 2021-12-09

## 2022-02-22 ENCOUNTER — OFFICE VISIT (OUTPATIENT)
Dept: PRIMARY CARE CLINIC | Facility: CLINIC | Age: 32
End: 2022-02-22
Payer: COMMERCIAL

## 2022-02-22 VITALS
TEMPERATURE: 98 F | OXYGEN SATURATION: 99 % | HEIGHT: 63 IN | SYSTOLIC BLOOD PRESSURE: 118 MMHG | DIASTOLIC BLOOD PRESSURE: 60 MMHG | RESPIRATION RATE: 16 BRPM | BODY MASS INDEX: 22.56 KG/M2 | WEIGHT: 127.31 LBS | HEART RATE: 70 BPM

## 2022-02-22 DIAGNOSIS — R10.9 FLANK PAIN: Primary | ICD-10-CM

## 2022-02-22 LAB
BILIRUB SERPL-MCNC: NORMAL MG/DL
BLOOD URINE, POC: NORMAL
CLARITY, POC UA: CLEAR
COLOR, POC UA: YELLOW
GLUCOSE UR QL STRIP: NORMAL
KETONES UR QL STRIP: NORMAL
NITRITE, POC UA: NORMAL
PH, POC UA: 5
PROTEIN, POC: NORMAL
SPECIFIC GRAVITY, POC UA: 1
UROBILINOGEN, POC UA: NORMAL

## 2022-02-22 PROCEDURE — 1159F MED LIST DOCD IN RCRD: CPT | Mod: CPTII,S$GLB,, | Performed by: NURSE PRACTITIONER

## 2022-02-22 PROCEDURE — 3078F DIAST BP <80 MM HG: CPT | Mod: CPTII,S$GLB,, | Performed by: NURSE PRACTITIONER

## 2022-02-22 PROCEDURE — 3008F BODY MASS INDEX DOCD: CPT | Mod: CPTII,S$GLB,, | Performed by: NURSE PRACTITIONER

## 2022-02-22 PROCEDURE — 99999 PR PBB SHADOW E&M-EST. PATIENT-LVL IV: ICD-10-PCS | Mod: PBBFAC,,, | Performed by: NURSE PRACTITIONER

## 2022-02-22 PROCEDURE — 81002 URINALYSIS NONAUTO W/O SCOPE: CPT | Mod: S$GLB,,, | Performed by: NURSE PRACTITIONER

## 2022-02-22 PROCEDURE — 99214 OFFICE O/P EST MOD 30 MIN: CPT | Mod: S$GLB,,, | Performed by: NURSE PRACTITIONER

## 2022-02-22 PROCEDURE — 1160F PR REVIEW ALL MEDS BY PRESCRIBER/CLIN PHARMACIST DOCUMENTED: ICD-10-PCS | Mod: CPTII,S$GLB,, | Performed by: NURSE PRACTITIONER

## 2022-02-22 PROCEDURE — 1159F PR MEDICATION LIST DOCUMENTED IN MEDICAL RECORD: ICD-10-PCS | Mod: CPTII,S$GLB,, | Performed by: NURSE PRACTITIONER

## 2022-02-22 PROCEDURE — 99214 PR OFFICE/OUTPT VISIT, EST, LEVL IV, 30-39 MIN: ICD-10-PCS | Mod: S$GLB,,, | Performed by: NURSE PRACTITIONER

## 2022-02-22 PROCEDURE — 99999 PR PBB SHADOW E&M-EST. PATIENT-LVL IV: CPT | Mod: PBBFAC,,, | Performed by: NURSE PRACTITIONER

## 2022-02-22 PROCEDURE — 3074F PR MOST RECENT SYSTOLIC BLOOD PRESSURE < 130 MM HG: ICD-10-PCS | Mod: CPTII,S$GLB,, | Performed by: NURSE PRACTITIONER

## 2022-02-22 PROCEDURE — 3074F SYST BP LT 130 MM HG: CPT | Mod: CPTII,S$GLB,, | Performed by: NURSE PRACTITIONER

## 2022-02-22 PROCEDURE — 81002 POCT URINE DIPSTICK WITHOUT MICROSCOPE: ICD-10-PCS | Mod: S$GLB,,, | Performed by: NURSE PRACTITIONER

## 2022-02-22 PROCEDURE — 3078F PR MOST RECENT DIASTOLIC BLOOD PRESSURE < 80 MM HG: ICD-10-PCS | Mod: CPTII,S$GLB,, | Performed by: NURSE PRACTITIONER

## 2022-02-22 PROCEDURE — 1160F RVW MEDS BY RX/DR IN RCRD: CPT | Mod: CPTII,S$GLB,, | Performed by: NURSE PRACTITIONER

## 2022-02-22 PROCEDURE — 3008F PR BODY MASS INDEX (BMI) DOCUMENTED: ICD-10-PCS | Mod: CPTII,S$GLB,, | Performed by: NURSE PRACTITIONER

## 2022-02-22 RX ORDER — TRETINOIN 0.5 MG/G
CREAM TOPICAL
Status: ON HOLD | COMMUNITY
Start: 2022-01-31 | End: 2023-06-02 | Stop reason: HOSPADM

## 2022-02-22 NOTE — PROGRESS NOTES
Chief Complaint  Chief Complaint   Patient presents with    Back Pain       HPI    Melinda Parekh is a 31 y.o. female that presents for R sided flank pain. Pt describes the pain as 6/10 stabbing pain that started last Friday, pain comes and goes but is becoming more frequent. States that the pain takes her breath away when it occurs. Denies burning with urination and frequency. Reports urgency. Denies fevers and chills. Pt has not used anything to tx the pain. Reports that imaging done during hospitalization for meningitis in 2020 found a 5mm non obstructing stone in the R kidney. Reports family hx of kidney stones. Denies cough, shortness of breath, and chest pain.         PAST MEDICAL HISTORY:  Past Medical History:   Diagnosis Date    H/O elbow surgery     Oral contraceptive use        PAST SURGICAL HISTORY:  Past Surgical History:   Procedure Laterality Date    left elbow Left 03/30/2017    torn ligament       SOCIAL HISTORY:  Social History     Socioeconomic History    Marital status:    Occupational History     Employer: [employer]   Tobacco Use    Smoking status: Never Smoker    Smokeless tobacco: Never Used   Substance and Sexual Activity    Alcohol use: Yes     Comment: occasionally     Drug use: No    Sexual activity: Yes     Partners: Male       FAMILY HISTORY:  Family History   Problem Relation Age of Onset    No Known Problems Maternal Grandmother     No Known Problems Sister     Heart attack Maternal Grandfather     No Known Problems Paternal Grandmother     No Known Problems Paternal Grandfather     Skin cancer Other     Breast cancer Other     Colon cancer Neg Hx     Ovarian cancer Neg Hx        ALLERGIES AND MEDICATIONS: updated and reviewed.  Review of patient's allergies indicates:   Allergen Reactions    No known drug allergies      Current Outpatient Medications   Medication Sig Dispense Refill    tretinoin (RETIN-A) 0.05 % cream SMARTSIG:Topical Every Evening    "    No current facility-administered medications for this visit.         ROS  Review of Systems   Constitutional: Negative.  Negative for chills, diaphoresis, fatigue and fever.   HENT: Negative.    Eyes: Negative.    Respiratory: Negative.  Negative for cough and shortness of breath.    Cardiovascular: Negative.  Negative for chest pain and palpitations.   Gastrointestinal: Negative.  Negative for abdominal pain, constipation, diarrhea, nausea and vomiting.   Genitourinary: Positive for flank pain and urgency. Negative for decreased urine volume, difficulty urinating, dysuria, frequency, hematuria and pelvic pain.   Musculoskeletal: Negative for arthralgias and myalgias.   Skin: Negative.    Neurological: Negative.            PHYSICAL EXAM  Vitals:    02/22/22 1002   BP: 118/60   BP Location: Right arm   Patient Position: Sitting   BP Method: Medium (Manual)   Pulse: 70   Resp: 16   Temp: 97.8 °F (36.6 °C)   TempSrc: Oral   SpO2: 99%   Weight: 57.7 kg (127 lb 5.1 oz)   Height: 5' 3" (1.6 m)    Body mass index is 22.55 kg/m².  Weight: 57.7 kg (127 lb 5.1 oz)   Height: 5' 3" (160 cm)     Physical Exam  Constitutional:       Appearance: Normal appearance. She is normal weight.   Cardiovascular:      Rate and Rhythm: Normal rate and regular rhythm.      Pulses: Normal pulses.      Heart sounds: Normal heart sounds.   Pulmonary:      Effort: Pulmonary effort is normal.      Breath sounds: Normal breath sounds.   Abdominal:      General: Abdomen is flat. Bowel sounds are normal. There is no distension.      Palpations: Abdomen is soft. There is no mass.      Tenderness: There is abdominal tenderness. There is no right CVA tenderness, left CVA tenderness, guarding or rebound.   Musculoskeletal:         General: No tenderness or signs of injury. Normal range of motion.      Cervical back: Normal range of motion.   Skin:     General: Skin is warm and dry.   Neurological:      Mental Status: She is alert.           Health " Maintenance       Date Due Completion Date    Lipid Panel Never done ---    COVID-19 Vaccine (1) 06/21/2022 (Originally 10/17/1995) ---    Influenza Vaccine (1) 06/30/2022 (Originally 9/1/2021) 9/25/2019    Cervical Cancer Screening 03/20/2024 3/20/2019    TETANUS VACCINE 11/07/2029 11/7/2019            Assessment & Plan    Problem List Items Addressed This Visit    None     Visit Diagnoses     Flank pain    -  Primary    Relevant Orders    POCT URINE DIPSTICK WITHOUT MICROSCOPE (Completed)    US Retroperitoneal Complete (Kidney and    No hematuria on urine dipstick.  Will send for culture.  Likely musculoskeletal if ultrasound negative and urine culture negative.  Patient agrees with plan.  Will continue to monitor          Follow-up: Follow up if symptoms worsen or fail to improve.    Dori Noel    Medication List with Changes/Refills   Current Medications    TRETINOIN (RETIN-A) 0.05 % CREAM    SMARTSIG:Topical Every Evening

## 2022-03-19 ENCOUNTER — PATIENT MESSAGE (OUTPATIENT)
Dept: PRIMARY CARE CLINIC | Facility: CLINIC | Age: 32
End: 2022-03-19
Payer: COMMERCIAL

## 2022-03-19 DIAGNOSIS — R10.9 FLANK PAIN: Primary | ICD-10-CM

## 2022-03-19 DIAGNOSIS — N20.0 KIDNEY STONE: ICD-10-CM

## 2022-03-28 NOTE — PROGRESS NOTES
"Subjective:      Melinda Parekh is a 31 y.o. female who was referred by Dori Noel NP for evaluation of right renal stone.      Urolithiasis  Patient complains of intermittent right flank pain. Recent US revealed 5 mm right renal stone with possible hydro vs extrarenal pelvis. She is currently asymptomatic today. Denies dysuria, hematuria, urgency, frequency. Reports that she was told about right renal stone several years ago. She denies renal stone history prior to this. Denies N/V/F/C.     The following portions of the patient's history were reviewed and updated as appropriate: allergies, current medications, past family history, past medical history, past social history, past surgical history and problem list.    Review of Systems  Constitutional: no fever or chills  ENT: no nasal congestion or sore throat  Respiratory: no cough or shortness of breath  Cardiovascular: no chest pain or palpitations  Gastrointestinal: no nausea or vomiting, tolerating diet  Genitourinary: as per HPI  Hematologic/Lymphatic: no easy bruising or lymphadenopathy  Musculoskeletal: no arthralgias or myalgias  Neurological: no seizures or tremors  Behavioral/Psych: no auditory or visual hallucinations     Objective:   Vital Signs:/71 (BP Location: Left arm, Patient Position: Sitting, BP Method: Small (Automatic))   Pulse 69   Ht 5' 3" (1.6 m)   Wt 55.8 kg (123 lb 0.3 oz)   BMI 21.79 kg/m²     Physical Exam   General: alert and oriented, no acute distress  Head: normocephalic, atraumatic  Neck: supple, no lymphadenopathy, normal ROM, no masses  Respiratory: Symmetric expansion, non-labored breathing  Cardiovascular: regular rate and rhythm, nomal pulses, no peripheral edema  Abdomen: soft, non tender, non distended, no palpable masses, no hernias, no hepatomegaly or splenomegaly  Skin: normal coloration and turgor, no rashes, no suspicious skin lesions noted  Neuro: alert and oriented x3, no gross deficits  Psych: normal " judgment and insight, normal mood/affect and non-anxious    Lab Review   Urinalysis demonstrates positive for leukocytes  Lab Results   Component Value Date    WBC 3.90 05/21/2020    HGB 13.3 05/21/2020    HCT 39.6 05/21/2020    MCV 84 05/21/2020     05/21/2020     Lab Results   Component Value Date    CREATININE 0.7 05/21/2020    BUN 13 05/21/2020       Imaging   US RETROPERITONEAL COMPLETE     CLINICAL HISTORY:  Unspecified abdominal pain     TECHNIQUE:  Ultrasound of the kidneys and urinary bladder was performed including color flow and Doppler evaluation of the kidneys.     COMPARISON:  03/04/2020     FINDINGS:  Right kidney: The right kidney measures 11.6 cm. No cortical thinning. No loss of corticomedullary distinction. Resistive index measures 0.62.  No mass. A 5 mm stone is present in the interpolar region.  There is mild dilation of the renal pelvis.     Left kidney: The left kidney measures 11.6 cm. No cortical thinning. No loss of corticomedullary distinction. Resistive index measures 0.60.  No mass. No renal stone. No hydronephrosis.     The pre void bladder volume is 164 cc.  The post void bladder volume is 4 cc.     Impression:     Mild dilatation of the right renal pelvis that could indicate hydronephrosis versus an extrarenal pelvis.  Cross-sectional imaging could be considered for further evaluation.     5 mm nonobstructing right renal stone.        Electronically signed by: Jane Suarez  Date:                                            03/03/2022  Time:                                           15:47    CT 2020  Impression:     No acute intra-abdominal or intrapelvic pathology appreciated.  Trace free fluid in the pelvis which can be physiologic in a patient of this age.  Small bilateral pleural effusions.  By bibasilar opacities for which the differential includes atelectasis versus pneumonia.  Correlation is suggested.  Nonobstructive right nephrolithiasis and other incidental findings  as above.        Electronically signed by: Dipesh Kim MD  Date:                                            03/07/2020  Time:                                           16:47    Assessment and Plan:   1. Flank pain  --Currently asymptomatic; no CVAT  --Will send UC to r/o UTI    2. Kidney stone  -Discussed proceeding with CT urogram for evaluation of hydro vs extrarenal pelvis; pt declines at this time  --She would like to proceed with surveillance and treat stone if needed  --Will fu in 6 months with KUB; sooner for new or worsening symptoms   --We discussed how to prevent future stones:   --Eat fewer high-oxalate foods (spinach, bran flakes, rhubarb, beets, potato chips, french fries, nuts and nut butters)   --Eat calcium rich foods   --Limit the vitamin C content of your diet. Do not take more than 500 mg of Vitamin C daily.   --It is very important to drink plenty of liquids. Your goal should be 10-12 glasses a day. At least 5-6 glasses should be water.    --Reduce sodium intake: 2-3 grams per day. Limit eating processed foods such as hot dogs, deli meats, sausage, canned products, dry soup mixes, sauerkraut, pickles, and various convenience mixes.   --Add citrus to diet i.e. lemonade, limes, orange juice.        This note is dictated on M*Modal word recognition program.  There are word recognition mistakes that are occasionally missed on review.

## 2022-03-29 ENCOUNTER — OFFICE VISIT (OUTPATIENT)
Dept: UROLOGY | Facility: CLINIC | Age: 32
End: 2022-03-29
Payer: COMMERCIAL

## 2022-03-29 VITALS
HEART RATE: 69 BPM | WEIGHT: 123 LBS | BODY MASS INDEX: 21.79 KG/M2 | HEIGHT: 63 IN | DIASTOLIC BLOOD PRESSURE: 71 MMHG | SYSTOLIC BLOOD PRESSURE: 110 MMHG

## 2022-03-29 DIAGNOSIS — N20.0 KIDNEY STONE: ICD-10-CM

## 2022-03-29 DIAGNOSIS — R10.9 FLANK PAIN: ICD-10-CM

## 2022-03-29 PROCEDURE — 3078F PR MOST RECENT DIASTOLIC BLOOD PRESSURE < 80 MM HG: ICD-10-PCS | Mod: CPTII,S$GLB,, | Performed by: NURSE PRACTITIONER

## 2022-03-29 PROCEDURE — 1159F MED LIST DOCD IN RCRD: CPT | Mod: CPTII,S$GLB,, | Performed by: NURSE PRACTITIONER

## 2022-03-29 PROCEDURE — 3074F SYST BP LT 130 MM HG: CPT | Mod: CPTII,S$GLB,, | Performed by: NURSE PRACTITIONER

## 2022-03-29 PROCEDURE — 1159F PR MEDICATION LIST DOCUMENTED IN MEDICAL RECORD: ICD-10-PCS | Mod: CPTII,S$GLB,, | Performed by: NURSE PRACTITIONER

## 2022-03-29 PROCEDURE — 1160F PR REVIEW ALL MEDS BY PRESCRIBER/CLIN PHARMACIST DOCUMENTED: ICD-10-PCS | Mod: CPTII,S$GLB,, | Performed by: NURSE PRACTITIONER

## 2022-03-29 PROCEDURE — 87086 URINE CULTURE/COLONY COUNT: CPT | Performed by: NURSE PRACTITIONER

## 2022-03-29 PROCEDURE — 3074F PR MOST RECENT SYSTOLIC BLOOD PRESSURE < 130 MM HG: ICD-10-PCS | Mod: CPTII,S$GLB,, | Performed by: NURSE PRACTITIONER

## 2022-03-29 PROCEDURE — 3078F DIAST BP <80 MM HG: CPT | Mod: CPTII,S$GLB,, | Performed by: NURSE PRACTITIONER

## 2022-03-29 PROCEDURE — 99999 PR PBB SHADOW E&M-EST. PATIENT-LVL IV: CPT | Mod: PBBFAC,,, | Performed by: NURSE PRACTITIONER

## 2022-03-29 PROCEDURE — 3008F BODY MASS INDEX DOCD: CPT | Mod: CPTII,S$GLB,, | Performed by: NURSE PRACTITIONER

## 2022-03-29 PROCEDURE — 99203 OFFICE O/P NEW LOW 30 MIN: CPT | Mod: S$GLB,,, | Performed by: NURSE PRACTITIONER

## 2022-03-29 PROCEDURE — 99203 PR OFFICE/OUTPT VISIT, NEW, LEVL III, 30-44 MIN: ICD-10-PCS | Mod: S$GLB,,, | Performed by: NURSE PRACTITIONER

## 2022-03-29 PROCEDURE — 1160F RVW MEDS BY RX/DR IN RCRD: CPT | Mod: CPTII,S$GLB,, | Performed by: NURSE PRACTITIONER

## 2022-03-29 PROCEDURE — 99999 PR PBB SHADOW E&M-EST. PATIENT-LVL IV: ICD-10-PCS | Mod: PBBFAC,,, | Performed by: NURSE PRACTITIONER

## 2022-03-29 PROCEDURE — 3008F PR BODY MASS INDEX (BMI) DOCUMENTED: ICD-10-PCS | Mod: CPTII,S$GLB,, | Performed by: NURSE PRACTITIONER

## 2022-03-30 LAB — BACTERIA UR CULT: NO GROWTH

## 2022-05-31 ENCOUNTER — PATIENT MESSAGE (OUTPATIENT)
Dept: ADMINISTRATIVE | Facility: HOSPITAL | Age: 32
End: 2022-05-31
Payer: COMMERCIAL

## 2022-06-08 NOTE — PROCEDURES
"Procedures     COLPOSCOPY:    Melinda Parekh is a 27 y.o. female   presents for colposcopy.  Patient's last menstrual period was 2017 (approximate)..  Her most recent pap smear shows low-grade squamous intraepithelial neoplasia (LGSIL - encompassing HPV,mild dysplasia,MARYJO I).      HPV positive for "other" and negative for 16 and 18    The abnormal test results were discussed.  We also discussed HPV infection and its association with abnormal Pap tests and cervical cancer.  The risks and benefits of colposcopy were reviewed.  She agrees to proceed.      UPT is negative    Vitals:    17 1508   BP: 110/82   Weight: 51.6 kg (113 lb 12.1 oz)   Height: 5' 3" (1.6 m)   PainSc: 0-No pain       COLPOSCOPY EXAM:   TIME OUT PERFORMED.     no mosaicism, no punctation, no abnormal vasculature and acetowhite lesion(s) noted at 4 o'clock    Biopsy was taken at 4 o'clock.  ECC was performed    Hemostasis was adequate with application of Monsel's solution.  The speculum was removed.  The patient did tolerate the procedure well.    All collected specimens sent to pathology for histologic analysis.    Screening examination for STD (sexually transmitted disease)  -     C. trachomatis/N. gonorrhoeae by AMP DNA Cervix    Exposure to STD  -     Hepatitis B surface antigen; Future; Expected date: 2017  -     HIV-1 and HIV-2 antibodies; Future; Expected date: 2017  -     RPR; Future; Expected date: 2017    Low grade squamous intraepith lesion on cytologic smear cervix (lgsil)  -     Tissue Specimen To Pathology, Obstetrics/Gynecology  -     Tissue Specimen To Pathology, Obstetrics/Gynecology        Post-colposcopy counseling:  For cramping take NSAIDs, Tylenol, or a prescription pain medication per the medication card.    Avoid intercourse, douching, or tampons in the vagina for at least 7 days.   Expect a clumpy brown, orange, yellow, or black discharge due to Monsel's solution application for several " SEE OCCUPATIONAL HEALTH NURSE/MA/TECH VISIT ONLY days.   Call the office for heavy bleeding, significant pain, or a  foul-smelling vaginal discharge.  The HPV vaccine was  recommended according to FDA age guidelines.  The importance of keeping follow-up appointments was discussed.    Final plan and follow up based on colposcopy results.

## 2022-08-22 ENCOUNTER — OFFICE VISIT (OUTPATIENT)
Dept: OBSTETRICS AND GYNECOLOGY | Facility: CLINIC | Age: 32
End: 2022-08-22
Attending: OBSTETRICS & GYNECOLOGY
Payer: COMMERCIAL

## 2022-08-22 VITALS — HEIGHT: 63 IN | BODY MASS INDEX: 22.56 KG/M2 | WEIGHT: 127.31 LBS

## 2022-08-22 DIAGNOSIS — Z01.419 ENCOUNTER FOR GYNECOLOGICAL EXAMINATION (GENERAL) (ROUTINE) WITHOUT ABNORMAL FINDINGS: ICD-10-CM

## 2022-08-22 DIAGNOSIS — Z11.51 ENCOUNTER FOR SCREENING FOR HUMAN PAPILLOMAVIRUS (HPV): Primary | ICD-10-CM

## 2022-08-22 DIAGNOSIS — Z12.4 ENCOUNTER FOR PAPANICOLAOU SMEAR FOR CERVICAL CANCER SCREENING: ICD-10-CM

## 2022-08-22 PROCEDURE — 3008F PR BODY MASS INDEX (BMI) DOCUMENTED: ICD-10-PCS | Mod: CPTII,S$GLB,, | Performed by: OBSTETRICS & GYNECOLOGY

## 2022-08-22 PROCEDURE — 99395 PR PREVENTIVE VISIT,EST,18-39: ICD-10-PCS | Mod: S$GLB,,, | Performed by: OBSTETRICS & GYNECOLOGY

## 2022-08-22 PROCEDURE — 3008F BODY MASS INDEX DOCD: CPT | Mod: CPTII,S$GLB,, | Performed by: OBSTETRICS & GYNECOLOGY

## 2022-08-22 PROCEDURE — 1159F PR MEDICATION LIST DOCUMENTED IN MEDICAL RECORD: ICD-10-PCS | Mod: CPTII,S$GLB,, | Performed by: OBSTETRICS & GYNECOLOGY

## 2022-08-22 PROCEDURE — 99999 PR PBB SHADOW E&M-EST. PATIENT-LVL III: ICD-10-PCS | Mod: PBBFAC,,, | Performed by: OBSTETRICS & GYNECOLOGY

## 2022-08-22 PROCEDURE — 1159F MED LIST DOCD IN RCRD: CPT | Mod: CPTII,S$GLB,, | Performed by: OBSTETRICS & GYNECOLOGY

## 2022-08-22 PROCEDURE — 87624 HPV HI-RISK TYP POOLED RSLT: CPT | Performed by: OBSTETRICS & GYNECOLOGY

## 2022-08-22 PROCEDURE — 99999 PR PBB SHADOW E&M-EST. PATIENT-LVL III: CPT | Mod: PBBFAC,,, | Performed by: OBSTETRICS & GYNECOLOGY

## 2022-08-22 PROCEDURE — 88175 CYTOPATH C/V AUTO FLUID REDO: CPT | Performed by: OBSTETRICS & GYNECOLOGY

## 2022-08-22 PROCEDURE — 99395 PREV VISIT EST AGE 18-39: CPT | Mod: S$GLB,,, | Performed by: OBSTETRICS & GYNECOLOGY

## 2022-08-22 NOTE — PROGRESS NOTES
Subjective:       Patient ID: Melinda Parekh is a 31 y.o. female.    Chief Complaint:  Annual Exam (Last pap/hpv 2019 normal)        History of Present Illness  Melinda Parekh is a 31 y.o. female  who presents for annual. No gyn complaints.  with vasectomy.     Patient's last menstrual period was 2022 (approximate).   Date of Last Pap: 2022    Review of Systems  Review of Systems   Constitutional: Negative for chills and fever.        Objective:   Physical Exam:   Constitutional: She is oriented to person, place, and time. Vital signs are normal. She appears well-developed and well-nourished. No distress.        Pulmonary/Chest: She exhibits no mass. Right breast exhibits no mass, no nipple discharge, no skin change, no tenderness, no bleeding and no swelling. Left breast exhibits no mass, no nipple discharge, no skin change, no tenderness, no bleeding and no swelling. Breasts are symmetrical.        Abdominal: Soft. Bowel sounds are normal. She exhibits no distension and no mass. There is no abdominal tenderness. There is no rebound.     Genitourinary:    Vagina and uterus normal.   There is no rash, tenderness, lesion or injury on the right labia. There is no rash, tenderness, lesion or injury on the left labia. Cervix is normal. Right adnexum displays no mass, no tenderness and no fullness. Left adnexum displays no mass, no tenderness and no fullness. No erythema,  no vaginal discharge, tenderness, rectocele, cystocele or unspecified prolapse of vaginal walls in the vagina. Cervix exhibits no motion tenderness, no discharge and no friability. Uterus is not deviated, not enlarged, not fixed, not tender and not hosting fibroids.           Musculoskeletal: Normal range of motion and moves all extremeties.      Lymphadenopathy:        Right: No supraclavicular adenopathy present.        Left: No supraclavicular adenopathy present.    Neurological: She is alert and oriented to person, place, and  time.    Skin: Skin is warm and dry.    Psychiatric: She has a normal mood and affect. Her behavior is normal. Judgment normal.        Assessment/ Plan:     1. Encounter for screening for human papillomavirus (HPV)  HPV High Risk Genotypes, PCR   2. Encounter for Papanicolaou smear for cervical cancer screening  Liquid-Based Pap Smear, Screening   3. Encounter for gynecological examination (general) (routine) without abnormal findings         Follow up in about 1 year (around 8/22/2023) for Annual exam.    As of April 1, 2021, the Cures Act has been passed nationally. This new law requires that all doctors progress notes, lab results, pathology reports and radiology reports be released IMMEDIATELY to the patient in the patient portal. That means that the results are released to you at the EXACT same time they are released to me. Therefore, with all of the patients that I have I am not able to reply to each patient exactly when the results come in. So there will be a delay from when you see the results to when I see them and have time to come up with a response to send you. Also I only see these results when I am on the computer at work. So if the results come in over the weekend or after 5 pm of a work day, I will not see them until the next business day. As you can tell, this is a challenge as a physician to give every patient the quick response they hope for and deserve. So please be patient! Thanks for understanding, Dr. Smith

## 2023-04-03 DIAGNOSIS — R39.89 SUSPECTED UTI: Primary | ICD-10-CM

## 2023-04-03 RX ORDER — NITROFURANTOIN 25; 75 MG/1; MG/1
100 CAPSULE ORAL 2 TIMES DAILY
Qty: 14 CAPSULE | Refills: 0 | Status: SHIPPED | OUTPATIENT
Start: 2023-04-03 | End: 2023-04-10

## 2023-04-03 NOTE — TELEPHONE ENCOUNTER
Since Wednesday, pt has been experiencing urgency and dysuria.  Reports temporary relief with OTC remedies.      Pt to drop off sample tomorrow morning in [place].    Macrobid pended, aware she can start it after sample given.

## 2023-04-17 NOTE — PROGRESS NOTES
"Subjective:      Melinda Parekh is a 32 y.o. female who returns today regarding right flank pain.    Last seen in 2022 for intermittent flank pain. US revealed 5 mm right renal stone with possible hydro vs extrarenal pelvis. Opted to monitor symptoms    Treated for culture proven UTI on 04/03 (Klebsiella). Symptoms including severe right flank pain, fever T max 102, N, dysuria.  Completed Macrobid x 7 days. Reports feeling well today. Still having intermittent right flank pain.       The following portions of the patient's history were reviewed and updated as appropriate: allergies, current medications, past family history, past medical history, past social history, past surgical history and problem list.    Review of Systems  A comprehensive multipoint review of systems was negative except as otherwise stated in the HPI.     Objective:   Vitals: /77 (BP Location: Right arm, Patient Position: Sitting, BP Method: Small (Automatic))   Pulse 63   Resp 16   Ht 5' 3" (1.6 m)   Wt 56.4 kg (124 lb 5.4 oz)   BMI 22.03 kg/m²       Physical Exam   General: alert and oriented, no acute distress  Head: normocephalic, atraumatic  Neck: supple, no lymphadenopathy, normal ROM, no masses  Respiratory: Symmetric expansion, non-labored breathing  Cardiovascular: regular rate and rhythm, nomal pulses, no peripheral edema  Abdomen: soft, non tender, non distended  Skin: normal coloration and turgor, no rashes, no suspicious skin lesions noted  Neuro: alert and oriented x3, no gross deficits  Psych: normal judgment and insight, normal mood/affect, and non-anxious    Lab Review   Urinalysis demonstrates negative for all components  Lab Results   Component Value Date    WBC 3.90 05/21/2020    HGB 13.3 05/21/2020    HCT 39.6 05/21/2020    MCV 84 05/21/2020     05/21/2020     Lab Results   Component Value Date    CREATININE 0.7 05/21/2020    BUN 13 05/21/2020       Imaging   US RETROPERITONEAL COMPLETE     CLINICAL " HISTORY:  Unspecified abdominal pain     TECHNIQUE:  Ultrasound of the kidneys and urinary bladder was performed including color flow and Doppler evaluation of the kidneys.     COMPARISON:  03/04/2020     FINDINGS:  Right kidney: The right kidney measures 11.6 cm. No cortical thinning. No loss of corticomedullary distinction. Resistive index measures 0.62.  No mass. A 5 mm stone is present in the interpolar region.  There is mild dilation of the renal pelvis.     Left kidney: The left kidney measures 11.6 cm. No cortical thinning. No loss of corticomedullary distinction. Resistive index measures 0.60.  No mass. No renal stone. No hydronephrosis.     The pre void bladder volume is 164 cc.  The post void bladder volume is 4 cc.     Impression:     Mild dilatation of the right renal pelvis that could indicate hydronephrosis versus an extrarenal pelvis.  Cross-sectional imaging could be considered for further evaluation.     5 mm nonobstructing right renal stone.        Electronically signed by: Jane Suarez  Date:                                            03/03/2022  Time:                                           15:47    CT 2020  Impression:     No acute intra-abdominal or intrapelvic pathology appreciated.  Trace free fluid in the pelvis which can be physiologic in a patient of this age.  Small bilateral pleural effusions.  By bibasilar opacities for which the differential includes atelectasis versus pneumonia.  Correlation is suggested.  Nonobstructive right nephrolithiasis and other incidental findings as above.        Electronically signed by: Dipesh Kim MD  Date:                                            03/07/2020  Time:                                           16:47      Assessment and Plan:   1. Flank pain  2. Kidney stone  --we discussed causes of right flank pain including but not limited to pyelonephritis, actively passing right renal stone, renal abscess, reflux of urine into right kidney.  Will  obtain CT urogram to evaluate for obstruction, passing stone and notify of results    3. Acute cystitis without hematuria  --completed Macrobid; symptoms improved.  UA today negative will send UC for confirmation         This note is dictated on M*Modal word recognition program.  There are word recognition mistakes that are occasionally missed on review.

## 2023-04-18 ENCOUNTER — OFFICE VISIT (OUTPATIENT)
Dept: UROLOGY | Facility: CLINIC | Age: 33
End: 2023-04-18
Payer: COMMERCIAL

## 2023-04-18 VITALS
BODY MASS INDEX: 22.03 KG/M2 | HEIGHT: 63 IN | SYSTOLIC BLOOD PRESSURE: 113 MMHG | HEART RATE: 63 BPM | RESPIRATION RATE: 16 BRPM | WEIGHT: 124.31 LBS | DIASTOLIC BLOOD PRESSURE: 77 MMHG

## 2023-04-18 DIAGNOSIS — N30.00 ACUTE CYSTITIS WITHOUT HEMATURIA: ICD-10-CM

## 2023-04-18 DIAGNOSIS — R10.9 FLANK PAIN: Primary | ICD-10-CM

## 2023-04-18 DIAGNOSIS — N20.0 KIDNEY STONE: ICD-10-CM

## 2023-04-18 LAB
BILIRUB SERPL-MCNC: NEGATIVE MG/DL
BLOOD URINE, POC: NEGATIVE
CLARITY, POC UA: CLEAR
COLOR, POC UA: YELLOW
GLUCOSE UR QL STRIP: NEGATIVE
KETONES UR QL STRIP: NEGATIVE
LEUKOCYTE ESTERASE URINE, POC: NEGATIVE
NITRITE, POC UA: NEGATIVE
PH, POC UA: 6
PROTEIN, POC: NEGATIVE
SPECIFIC GRAVITY, POC UA: 1.01
UROBILINOGEN, POC UA: NORMAL

## 2023-04-18 PROCEDURE — 87186 SC STD MICRODIL/AGAR DIL: CPT | Performed by: NURSE PRACTITIONER

## 2023-04-18 PROCEDURE — 87077 CULTURE AEROBIC IDENTIFY: CPT | Performed by: NURSE PRACTITIONER

## 2023-04-18 PROCEDURE — 87088 URINE BACTERIA CULTURE: CPT | Performed by: NURSE PRACTITIONER

## 2023-04-18 PROCEDURE — 1160F PR REVIEW ALL MEDS BY PRESCRIBER/CLIN PHARMACIST DOCUMENTED: ICD-10-PCS | Mod: CPTII,S$GLB,, | Performed by: NURSE PRACTITIONER

## 2023-04-18 PROCEDURE — 3074F PR MOST RECENT SYSTOLIC BLOOD PRESSURE < 130 MM HG: ICD-10-PCS | Mod: CPTII,S$GLB,, | Performed by: NURSE PRACTITIONER

## 2023-04-18 PROCEDURE — 1159F MED LIST DOCD IN RCRD: CPT | Mod: CPTII,S$GLB,, | Performed by: NURSE PRACTITIONER

## 2023-04-18 PROCEDURE — 3074F SYST BP LT 130 MM HG: CPT | Mod: CPTII,S$GLB,, | Performed by: NURSE PRACTITIONER

## 2023-04-18 PROCEDURE — 3008F BODY MASS INDEX DOCD: CPT | Mod: CPTII,S$GLB,, | Performed by: NURSE PRACTITIONER

## 2023-04-18 PROCEDURE — 3078F PR MOST RECENT DIASTOLIC BLOOD PRESSURE < 80 MM HG: ICD-10-PCS | Mod: CPTII,S$GLB,, | Performed by: NURSE PRACTITIONER

## 2023-04-18 PROCEDURE — 3008F PR BODY MASS INDEX (BMI) DOCUMENTED: ICD-10-PCS | Mod: CPTII,S$GLB,, | Performed by: NURSE PRACTITIONER

## 2023-04-18 PROCEDURE — 99214 PR OFFICE/OUTPT VISIT, EST, LEVL IV, 30-39 MIN: ICD-10-PCS | Mod: S$GLB,,, | Performed by: NURSE PRACTITIONER

## 2023-04-18 PROCEDURE — 87086 URINE CULTURE/COLONY COUNT: CPT | Performed by: NURSE PRACTITIONER

## 2023-04-18 PROCEDURE — 99999 PR PBB SHADOW E&M-EST. PATIENT-LVL III: ICD-10-PCS | Mod: PBBFAC,,, | Performed by: NURSE PRACTITIONER

## 2023-04-18 PROCEDURE — 99214 OFFICE O/P EST MOD 30 MIN: CPT | Mod: S$GLB,,, | Performed by: NURSE PRACTITIONER

## 2023-04-18 PROCEDURE — 99999 PR PBB SHADOW E&M-EST. PATIENT-LVL III: CPT | Mod: PBBFAC,,, | Performed by: NURSE PRACTITIONER

## 2023-04-18 PROCEDURE — 81002 URINALYSIS NONAUTO W/O SCOPE: CPT | Mod: S$GLB,,, | Performed by: NURSE PRACTITIONER

## 2023-04-18 PROCEDURE — 1160F RVW MEDS BY RX/DR IN RCRD: CPT | Mod: CPTII,S$GLB,, | Performed by: NURSE PRACTITIONER

## 2023-04-18 PROCEDURE — 3078F DIAST BP <80 MM HG: CPT | Mod: CPTII,S$GLB,, | Performed by: NURSE PRACTITIONER

## 2023-04-18 PROCEDURE — 81002 POCT URINE DIPSTICK WITHOUT MICROSCOPE: ICD-10-PCS | Mod: S$GLB,,, | Performed by: NURSE PRACTITIONER

## 2023-04-18 PROCEDURE — 1159F PR MEDICATION LIST DOCUMENTED IN MEDICAL RECORD: ICD-10-PCS | Mod: CPTII,S$GLB,, | Performed by: NURSE PRACTITIONER

## 2023-04-20 ENCOUNTER — PATIENT MESSAGE (OUTPATIENT)
Dept: UROLOGY | Facility: CLINIC | Age: 33
End: 2023-04-20
Payer: COMMERCIAL

## 2023-04-20 ENCOUNTER — PATIENT MESSAGE (OUTPATIENT)
Dept: OBSTETRICS AND GYNECOLOGY | Facility: CLINIC | Age: 33
End: 2023-04-20
Payer: COMMERCIAL

## 2023-04-20 DIAGNOSIS — N30.00 ACUTE CYSTITIS WITHOUT HEMATURIA: Primary | ICD-10-CM

## 2023-04-20 LAB — BACTERIA UR CULT: ABNORMAL

## 2023-04-20 RX ORDER — SULFAMETHOXAZOLE AND TRIMETHOPRIM 400; 80 MG/1; MG/1
1 TABLET ORAL 2 TIMES DAILY
Qty: 14 TABLET | Refills: 0 | Status: SHIPPED | OUTPATIENT
Start: 2023-04-20 | End: 2023-04-27

## 2023-04-20 NOTE — TELEPHONE ENCOUNTER
----- Message from Mona Dorsey sent at 4/20/2023  2:23 PM CDT -----  Regarding: Test Results  Contact: Pt @ [phone]  Pt is calling to have Provider call her back to discuss test results. Asking for a call back

## 2023-05-30 ENCOUNTER — HOSPITAL ENCOUNTER (INPATIENT)
Facility: OTHER | Age: 33
LOS: 3 days | Discharge: HOME | DRG: 872 | End: 2023-06-02
Attending: EMERGENCY MEDICINE | Admitting: HOSPITALIST
Payer: COMMERCIAL

## 2023-05-30 DIAGNOSIS — N12 PYELONEPHRITIS: Primary | ICD-10-CM

## 2023-05-30 DIAGNOSIS — R50.9 FEVER: ICD-10-CM

## 2023-05-30 DIAGNOSIS — N13.9 OBSTRUCTIVE UROPATHY: ICD-10-CM

## 2023-05-30 DIAGNOSIS — A41.9 SEPSIS, DUE TO UNSPECIFIED ORGANISM, UNSPECIFIED WHETHER ACUTE ORGAN DYSFUNCTION PRESENT: ICD-10-CM

## 2023-05-30 PROBLEM — Q62.11 HYDRONEPHROSIS WITH URETEROPELVIC JUNCTION (UPJ) OBSTRUCTION: Status: ACTIVE | Noted: 2023-05-30

## 2023-05-30 PROBLEM — N13.2 HYDRONEPHROSIS WITH URINARY OBSTRUCTION DUE TO URETERAL CALCULUS: Status: ACTIVE | Noted: 2023-05-30

## 2023-05-30 LAB
ALBUMIN SERPL BCP-MCNC: 3.9 G/DL (ref 3.5–5.2)
ALP SERPL-CCNC: 42 U/L (ref 55–135)
ALT SERPL W/O P-5'-P-CCNC: 21 U/L (ref 10–44)
ANION GAP SERPL CALC-SCNC: 10 MMOL/L (ref 8–16)
AST SERPL-CCNC: 22 U/L (ref 10–40)
BACTERIA #/AREA URNS HPF: ABNORMAL /HPF
BASOPHILS # BLD AUTO: 0.02 K/UL (ref 0–0.2)
BASOPHILS NFR BLD: 0.1 % (ref 0–1.9)
BILIRUB SERPL-MCNC: 1.2 MG/DL (ref 0.1–1)
BILIRUB UR QL STRIP: NEGATIVE
BUN SERPL-MCNC: 9 MG/DL (ref 6–20)
CALCIUM SERPL-MCNC: 9.3 MG/DL (ref 8.7–10.5)
CHLORIDE SERPL-SCNC: 104 MMOL/L (ref 95–110)
CLARITY UR: CLEAR
CO2 SERPL-SCNC: 22 MMOL/L (ref 23–29)
COLOR UR: COLORLESS
CREAT SERPL-MCNC: 0.9 MG/DL (ref 0.5–1.4)
DIFFERENTIAL METHOD: ABNORMAL
EOSINOPHIL # BLD AUTO: 0 K/UL (ref 0–0.5)
EOSINOPHIL NFR BLD: 0 % (ref 0–8)
ERYTHROCYTE [DISTWIDTH] IN BLOOD BY AUTOMATED COUNT: 12.4 % (ref 11.5–14.5)
EST. GFR  (NO RACE VARIABLE): >60 ML/MIN/1.73 M^2
GLUCOSE SERPL-MCNC: 105 MG/DL (ref 70–110)
GLUCOSE UR QL STRIP: NEGATIVE
HCT VFR BLD AUTO: 39.8 % (ref 37–48.5)
HGB BLD-MCNC: 13.6 G/DL (ref 12–16)
HGB UR QL STRIP: ABNORMAL
IMM GRANULOCYTES # BLD AUTO: 0.07 K/UL (ref 0–0.04)
IMM GRANULOCYTES NFR BLD AUTO: 0.5 % (ref 0–0.5)
KETONES UR QL STRIP: ABNORMAL
LDH SERPL L TO P-CCNC: 1.29 MMOL/L (ref 0.5–2.2)
LEUKOCYTE ESTERASE UR QL STRIP: ABNORMAL
LYMPHOCYTES # BLD AUTO: 0.5 K/UL (ref 1–4.8)
LYMPHOCYTES NFR BLD: 3.4 % (ref 18–48)
MCH RBC QN AUTO: 30.4 PG (ref 27–31)
MCHC RBC AUTO-ENTMCNC: 34.2 G/DL (ref 32–36)
MCV RBC AUTO: 89 FL (ref 82–98)
MICROSCOPIC COMMENT: ABNORMAL
MONOCYTES # BLD AUTO: 0.7 K/UL (ref 0.3–1)
MONOCYTES NFR BLD: 4.8 % (ref 4–15)
NEUTROPHILS # BLD AUTO: 14 K/UL (ref 1.8–7.7)
NEUTROPHILS NFR BLD: 91.2 % (ref 38–73)
NITRITE UR QL STRIP: NEGATIVE
NRBC BLD-RTO: 0 /100 WBC
PH UR STRIP: 7 [PH] (ref 5–8)
PLATELET # BLD AUTO: 165 K/UL (ref 150–450)
PMV BLD AUTO: 11.1 FL (ref 9.2–12.9)
POTASSIUM SERPL-SCNC: 3.4 MMOL/L (ref 3.5–5.1)
PROT SERPL-MCNC: 7.1 G/DL (ref 6–8.4)
PROT UR QL STRIP: NEGATIVE
RBC # BLD AUTO: 4.48 M/UL (ref 4–5.4)
RBC #/AREA URNS HPF: 1 /HPF (ref 0–4)
SAMPLE: NORMAL
SODIUM SERPL-SCNC: 136 MMOL/L (ref 136–145)
SP GR UR STRIP: <1.005 (ref 1–1.03)
SQUAMOUS #/AREA URNS HPF: 5 /HPF
URN SPEC COLLECT METH UR: ABNORMAL
UROBILINOGEN UR STRIP-ACNC: NEGATIVE EU/DL
WBC # BLD AUTO: 15.32 K/UL (ref 3.9–12.7)
WBC #/AREA URNS HPF: 12 /HPF (ref 0–5)

## 2023-05-30 PROCEDURE — 87077 CULTURE AEROBIC IDENTIFY: CPT | Mod: 59 | Performed by: NURSE PRACTITIONER

## 2023-05-30 PROCEDURE — 87086 URINE CULTURE/COLONY COUNT: CPT | Performed by: NURSE PRACTITIONER

## 2023-05-30 PROCEDURE — 80053 COMPREHEN METABOLIC PANEL: CPT | Performed by: NURSE PRACTITIONER

## 2023-05-30 PROCEDURE — 87040 BLOOD CULTURE FOR BACTERIA: CPT | Mod: 59 | Performed by: NURSE PRACTITIONER

## 2023-05-30 PROCEDURE — 63600175 PHARM REV CODE 636 W HCPCS: Performed by: NURSE PRACTITIONER

## 2023-05-30 PROCEDURE — 99223 1ST HOSP IP/OBS HIGH 75: CPT | Mod: ,,, | Performed by: NURSE PRACTITIONER

## 2023-05-30 PROCEDURE — 25000003 PHARM REV CODE 250: Performed by: EMERGENCY MEDICINE

## 2023-05-30 PROCEDURE — 81000 URINALYSIS NONAUTO W/SCOPE: CPT | Performed by: NURSE PRACTITIONER

## 2023-05-30 PROCEDURE — 87186 SC STD MICRODIL/AGAR DIL: CPT | Mod: 59 | Performed by: NURSE PRACTITIONER

## 2023-05-30 PROCEDURE — 85025 COMPLETE CBC W/AUTO DIFF WBC: CPT | Performed by: NURSE PRACTITIONER

## 2023-05-30 PROCEDURE — 99285 EMERGENCY DEPT VISIT HI MDM: CPT | Mod: 25

## 2023-05-30 PROCEDURE — 96365 THER/PROPH/DIAG IV INF INIT: CPT

## 2023-05-30 PROCEDURE — 63600175 PHARM REV CODE 636 W HCPCS: Performed by: EMERGENCY MEDICINE

## 2023-05-30 PROCEDURE — 25000003 PHARM REV CODE 250: Performed by: NURSE PRACTITIONER

## 2023-05-30 PROCEDURE — 93010 EKG 12-LEAD: ICD-10-PCS | Mod: ,,, | Performed by: INTERNAL MEDICINE

## 2023-05-30 PROCEDURE — 99223 PR INITIAL HOSPITAL CARE,LEVL III: ICD-10-PCS | Mod: ,,, | Performed by: NURSE PRACTITIONER

## 2023-05-30 PROCEDURE — 87154 CUL TYP ID BLD PTHGN 6+ TRGT: CPT | Performed by: NURSE PRACTITIONER

## 2023-05-30 PROCEDURE — 25000003 PHARM REV CODE 250: Performed by: STUDENT IN AN ORGANIZED HEALTH CARE EDUCATION/TRAINING PROGRAM

## 2023-05-30 PROCEDURE — 93010 ELECTROCARDIOGRAM REPORT: CPT | Mod: ,,, | Performed by: INTERNAL MEDICINE

## 2023-05-30 PROCEDURE — 93005 ELECTROCARDIOGRAM TRACING: CPT

## 2023-05-30 PROCEDURE — 87088 URINE BACTERIA CULTURE: CPT | Performed by: NURSE PRACTITIONER

## 2023-05-30 PROCEDURE — 82365 CALCULUS SPECTROSCOPY: CPT | Performed by: NURSE PRACTITIONER

## 2023-05-30 PROCEDURE — 11000001 HC ACUTE MED/SURG PRIVATE ROOM

## 2023-05-30 RX ORDER — KETOROLAC TROMETHAMINE 30 MG/ML
15 INJECTION, SOLUTION INTRAMUSCULAR; INTRAVENOUS EVERY 6 HOURS
Status: DISCONTINUED | OUTPATIENT
Start: 2023-05-30 | End: 2023-05-31

## 2023-05-30 RX ORDER — METOCLOPRAMIDE HYDROCHLORIDE 5 MG/ML
10 INJECTION INTRAMUSCULAR; INTRAVENOUS ONCE AS NEEDED
Status: DISCONTINUED | OUTPATIENT
Start: 2023-05-30 | End: 2023-05-30

## 2023-05-30 RX ORDER — ENOXAPARIN SODIUM 100 MG/ML
40 INJECTION SUBCUTANEOUS EVERY 24 HOURS
Status: DISCONTINUED | OUTPATIENT
Start: 2023-05-31 | End: 2023-06-02 | Stop reason: HOSPADM

## 2023-05-30 RX ORDER — ONDANSETRON 4 MG/1
4 TABLET, ORALLY DISINTEGRATING ORAL EVERY 6 HOURS PRN
Qty: 30 TABLET | Refills: 0 | Status: ON HOLD | OUTPATIENT
Start: 2023-05-30 | End: 2023-06-02 | Stop reason: HOSPADM

## 2023-05-30 RX ORDER — SODIUM CHLORIDE 0.9 % (FLUSH) 0.9 %
10 SYRINGE (ML) INJECTION
Status: DISCONTINUED | OUTPATIENT
Start: 2023-05-30 | End: 2023-06-02 | Stop reason: HOSPADM

## 2023-05-30 RX ORDER — ACETAMINOPHEN 500 MG
1000 TABLET ORAL
Status: COMPLETED | OUTPATIENT
Start: 2023-05-30 | End: 2023-05-30

## 2023-05-30 RX ORDER — OXYBUTYNIN CHLORIDE 5 MG/1
10 TABLET, EXTENDED RELEASE ORAL DAILY
Status: DISCONTINUED | OUTPATIENT
Start: 2023-05-30 | End: 2023-05-30

## 2023-05-30 RX ORDER — SODIUM CHLORIDE 9 MG/ML
INJECTION, SOLUTION INTRAVENOUS CONTINUOUS
Status: DISCONTINUED | OUTPATIENT
Start: 2023-05-30 | End: 2023-05-31

## 2023-05-30 RX ORDER — ACETAMINOPHEN 325 MG/1
650 TABLET ORAL EVERY 4 HOURS PRN
Status: DISCONTINUED | OUTPATIENT
Start: 2023-05-30 | End: 2023-05-31

## 2023-05-30 RX ORDER — ONDANSETRON 2 MG/ML
4 INJECTION INTRAMUSCULAR; INTRAVENOUS
Status: DISCONTINUED | OUTPATIENT
Start: 2023-05-30 | End: 2023-05-30

## 2023-05-30 RX ORDER — HYDROMORPHONE HYDROCHLORIDE 1 MG/ML
0.5 INJECTION, SOLUTION INTRAMUSCULAR; INTRAVENOUS; SUBCUTANEOUS EVERY 30 MIN PRN
Status: DISCONTINUED | OUTPATIENT
Start: 2023-05-30 | End: 2023-05-30

## 2023-05-30 RX ORDER — TAMSULOSIN HYDROCHLORIDE 0.4 MG/1
0.4 CAPSULE ORAL DAILY
Status: DISCONTINUED | OUTPATIENT
Start: 2023-05-31 | End: 2023-06-02 | Stop reason: HOSPADM

## 2023-05-30 RX ORDER — METHOCARBAMOL 100 MG/ML
500 INJECTION, SOLUTION INTRAMUSCULAR; INTRAVENOUS EVERY 6 HOURS
Status: DISCONTINUED | OUTPATIENT
Start: 2023-05-30 | End: 2023-05-30

## 2023-05-30 RX ORDER — TALC
6 POWDER (GRAM) TOPICAL NIGHTLY PRN
Status: CANCELLED | OUTPATIENT
Start: 2023-05-30

## 2023-05-30 RX ORDER — POLYETHYLENE GLYCOL 3350 17 G/17G
17 POWDER, FOR SOLUTION ORAL DAILY
Status: DISCONTINUED | OUTPATIENT
Start: 2023-05-30 | End: 2023-05-30

## 2023-05-30 RX ORDER — ONDANSETRON 2 MG/ML
4 INJECTION INTRAMUSCULAR; INTRAVENOUS EVERY 8 HOURS PRN
Status: CANCELLED | OUTPATIENT
Start: 2023-05-30

## 2023-05-30 RX ORDER — SODIUM CHLORIDE 0.9 % (FLUSH) 0.9 %
10 SYRINGE (ML) INJECTION EVERY 8 HOURS PRN
Status: DISCONTINUED | OUTPATIENT
Start: 2023-05-30 | End: 2023-05-30

## 2023-05-30 RX ORDER — ONDANSETRON 2 MG/ML
8 INJECTION INTRAMUSCULAR; INTRAVENOUS ONCE AS NEEDED
Status: DISCONTINUED | OUTPATIENT
Start: 2023-05-30 | End: 2023-05-30

## 2023-05-30 RX ORDER — HYDROMORPHONE HYDROCHLORIDE 1 MG/ML
0.5 INJECTION, SOLUTION INTRAMUSCULAR; INTRAVENOUS; SUBCUTANEOUS EVERY 4 HOURS PRN
Status: DISCONTINUED | OUTPATIENT
Start: 2023-05-30 | End: 2023-05-31

## 2023-05-30 RX ORDER — NALOXONE HCL 0.4 MG/ML
0.02 VIAL (ML) INJECTION
Status: DISCONTINUED | OUTPATIENT
Start: 2023-05-30 | End: 2023-06-02 | Stop reason: HOSPADM

## 2023-05-30 RX ORDER — ONDANSETRON 2 MG/ML
4 INJECTION INTRAMUSCULAR; INTRAVENOUS EVERY 8 HOURS PRN
Status: DISCONTINUED | OUTPATIENT
Start: 2023-05-30 | End: 2023-06-02 | Stop reason: HOSPADM

## 2023-05-30 RX ADMIN — SODIUM CHLORIDE: 9 INJECTION, SOLUTION INTRAVENOUS at 08:05

## 2023-05-30 RX ADMIN — ONDANSETRON 4 MG: 2 INJECTION INTRAMUSCULAR; INTRAVENOUS at 07:05

## 2023-05-30 RX ADMIN — CEFTRIAXONE 1 G: 1 INJECTION, POWDER, FOR SOLUTION INTRAMUSCULAR; INTRAVENOUS at 06:05

## 2023-05-30 RX ADMIN — OXYBUTYNIN CHLORIDE 10 MG: 5 TABLET, EXTENDED RELEASE ORAL at 08:05

## 2023-05-30 RX ADMIN — ACETAMINOPHEN 1000 MG: 500 TABLET, FILM COATED ORAL at 07:05

## 2023-05-30 RX ADMIN — SODIUM CHLORIDE 1000 ML: 9 INJECTION, SOLUTION INTRAVENOUS at 06:05

## 2023-05-30 NOTE — HPI
"Per Juaquin Esparza, NP:    "The patient is a 32 year old female with no significant past medical history presents with fevers since last night associated with right flank pain, lower abdominal pain.  She notes sensation of incomplete void, urinary frequency.  She denies dysuria.  She states that her fevers as been as high as 104F.  She was seen here yesterday for flank and abdominal pain.  She was diagnosed with "5 mm obstructing calculus at the right ureteral vesicular junction with mild-moderate right hydroureteronephrosis." She was discharged with tamsulosin, Norco, ketorolac.  She reports that pain has not improved with these.  She started vomiting overnight.  The patient will be admitted for further management of her acute pyelonephritis and Urology consult."  "

## 2023-05-30 NOTE — FIRST PROVIDER EVALUATION
Emergency Department TeleTriage Encounter Note      CHIEF COMPLAINT    Chief Complaint   Patient presents with    Fever     Pt reports fever x2 days and RLQ abd pain. States she was seen yesterday for kidney stones and discharged. Reports Tmax 104 earlier today. State she took Tylenol but then vomited after taking. Reports blood in urine, unsure if she has passed any part of stone.       VITAL SIGNS   Initial Vitals [05/30/23 1705]   BP Pulse Resp Temp SpO2   119/63 (!) 125 18 100.1 °F (37.8 °C) 100 %      MAP       --            ALLERGIES    Review of patient's allergies indicates:   Allergen Reactions    No known drug allergies        PROVIDER TRIAGE NOTE  TeleTriage Note: Melinda Parekh, a nontoxic/well appearing, 32 y.o. female, presented to the ED with c/o having fever and trouble peeing. Diagnosed with kidney stone. Tmax 104.1. Triage nurse and charge notified that patient needs to be seen next.    All ED beds are full at present; patient notified of this status.  Patient seen and medically screened by Nurse Practitioner via teletriage. Orders initiated at triage to expedite care.  Patient is stable to return to the waiting room and will be placed in an ED bed when available.  Care will be transferred to an alternate provider when patient has been placed in an Exam Room from the [facility] for physical exam, additional orders, and disposition.  5:20 PM Dori Car DNP, FNP-C      All ED beds are full at present; patient notified of this status.  Patient seen and medically screened by Nurse Practitioner via teletriage. Orders initiated at triage to expedite care.  Patient is stable to return to the waiting room and will be placed in an ED bed when available.  Care will be transferred to an alternate provider when patient has been placed in an Exam Room from the [facility] for physical exam, additional orders, and disposition.  5:19 PM Dori Car DNP, FNP-C        ORDERS  Labs Reviewed   CULTURE, BLOOD   CULTURE,  BLOOD   CBC W/ AUTO DIFFERENTIAL   COMPREHENSIVE METABOLIC PANEL   LACTIC ACID, PLASMA   URINALYSIS, REFLEX TO URINE CULTURE       ED Orders (720h ago, onward)      Start Ordered     Status Ordering Provider    05/30/23 2121 05/30/23 1721  Lactic acid, plasma #2  In 4 hours         Ordered CANDIDO BROWNRonaldo    05/30/23 1730 05/30/23 1721  acetaminophen tablet 1,000 mg  ED 1 Time         Ordered KEVINCANDIDO STILES    05/30/23 1730 05/30/23 1721  ondansetron injection 4 mg  ED 1 Time         Ordered KEVIN, CANDIDO DUNCANRonaldo    05/30/23 1721 05/30/23 1721  ED Preference List Used to Initiate Sepsis Orders  Until discontinued         Ordered KEVINCANDIDO KOO    05/30/23 1721 05/30/23 1721  Blood culture x two cultures. Draw prior to antibiotics.  Every 15 min      Comments: Aerobic and anaerobic     Order ID Start Status Ordering Provider   [id number] 05/30/23 1721 Ordered KEVIN, CANDIDO SY   [id number] 05/30/23 1736 Ordered KEVIN CANDIDO SY       Ordered KEVIN CANDIDO SY    05/30/23 1721 05/30/23 1721  CBC auto differential  STAT         Ordered KEVIN, CANDIDOGENNA DAN    05/30/23 1721 05/30/23 1721  Comprehensive metabolic panel  STAT         Ordered KEVIN, CANDIDO SY    05/30/23 1721 05/30/23 1721  POCT Lactate  Once        Comments: This test should be used for VBGs.  If using this order for other tests (K, creatinine, HCT, PT/INR, lactate etc)  ONLY do so in the case of an emergency or rapid response.Notify Physician if: see parameters below.      Ordered KEVIN CANDIDO SY    05/30/23 1721 05/30/23 1721  Vital signs  Every 15 min        Comments: Every 15 minutes until SBP greater than 90 or MAP greater than 65, then every 30 minutes times one hour, then every one hour.    Ordered KEVIN CANDIDOGENNA DAN    05/30/23 1721 05/30/23 1721  Bed rest  Until discontinued         Ordered KEVINCANDIDO    05/30/23 1721 05/30/23 1721  Cardiac Monitoring - Adult  Continuous        Comments: Notify Physician If:    Ordered  KEVIN CANDIDO DUNCANRonaldo    05/30/23 1721 05/30/23 1721  Pulse Oximetry Continuous  Continuous         Ordered KEVIN CANDIDO DUNCANRonaldo    05/30/23 1721 05/30/23 1721  Strict intake and output  Until discontinued         Ordered KEVIN, CANDIDO DUNCANRonaldo    05/30/23 1721 05/30/23 1721  Urinalysis, Reflex to Urine Culture Urine, Clean Catch  STAT         Ordered KEVINLILLIANCANDIDO SY    05/30/23 1721 05/30/23 1721  Saline lock IV  Once         Ordered KEVIN, CANDIDO MRonaldo    05/30/23 1721 05/30/23 1721  EKG 12-lead  Once         Ordered KEVIN, CANDIDO DUNCANRonaldo    05/30/23 1721 05/30/23 1721  X-Ray Chest AP Portable  1 time imaging         Ordered KEVIN CANDIDO SY              Virtual Visit Note: The provider triage portion of this emergency department evaluation and documentation was performed via [employer], a HIPAA-compliant telemedicine application, in concert with a tele-presenter in the room. A face to face patient evaluation with one of my colleagues will occur once the patient is placed in an emergency department room.      DISCLAIMER: This note was prepared with [employer] voice recognition transcription software. Garbled syntax, mangled pronouns, and other bizarre constructions may be attributed to that software system.

## 2023-05-30 NOTE — ED PROVIDER NOTES
"  Source of History:  Medical record, patient, patient's     Chief complaint:  Per triage note: "Fever (Pt reports fever x2 days and RLQ abd pain. States she was seen yesterday for kidney stones and discharged. Reports Tmax 104 earlier today. State she took Tylenol but then vomited after taking. Reports blood in urine, unsure if she has passed any part of stone.)  "    HPI:  My history differs from that at triage.  Patient presents with fevers since last night associated with right flank pain, lower abdominal pain.  She notes sensation of incomplete void, urinary frequency.  She denies dysuria.  She states that her fevers as been as high as 104F.   She was seen here yesterday for flank and abdominal pain.  She was diagnosed with "5 mm obstructing calculus at the right ureteral vesicular junction with mild-moderate right hydroureteronephrosis." She was discharged with tamsulosin, Norco, ketorolac.  She reports that pain and not improved with these.  She started vomiting overnight.  She took acetaminophen once her fever started but she vomited this up.  Patient states that she also took tramadol but this did not seem to help (unclear if she took tramadol from a prior illness or alternate source, or is confusing with Toradol).       ROS:   See HPI for pertinent review of systems      Review of patient's allergies indicates:   Allergen Reactions    No known drug allergies        PMH:  As per HPI and below:  Past Medical History:   Diagnosis Date    H/O elbow surgery     Oral contraceptive use        Past Surgical History:   Procedure Laterality Date    left elbow Left 03/30/2017    torn ligament       Social History     Tobacco Use    Smoking status: Never    Smokeless tobacco: Never   Substance Use Topics    Alcohol use: Yes     Comment: occasionally     Drug use: No       Physical Exam:      Nursing note and vitals reviewed.  /63 (BP Location: Left arm, Patient Position: Sitting)   Pulse (!) 125   Temp " 100.1 °F (37.8 °C) (Oral)   Resp 18   Wt 55.3 kg (122 lb)   LMP 04/29/2023 (Approximate)   SpO2 100%   BMI 21.61 kg/m²     Constitutional: No distress.  Tired and uncomfortable appearance.  Eyes: EOMI. No discharge. Anicteric.  HENT:   Neck: Normal range of motion. Neck supple.  Cardiovascular: Normal rate. No murmur, no gallop and no friction rub heard.   Pulmonary/Chest: No respiratory distress. Effort normal. No wheezes, no rales, no rhonchi.   Abdominal: Bowel sounds normal. Soft. No distension and no mass. There is no tenderness. There is no rebound, no guarding, no tenderness at McBurney's point.  Neurological: GCS 15. Alert and oriented to person, place, and time. No gross cranial nerve, light touch or strength deficit. Coordination normal.   Skin: Skin is warm and dry.   EXT: 2+ radial pulses.   MSK: No CVA tenderness.   Psychiatric: Behavior is normal. Judgment normal.    Medical Decision Making / Independent Interpretations / External Records Reviewed:      ED Course as of 05/30/23 2051   Tue May 30, 2023   1744 --  EKG Interpretation: I independently reviewed and interpreted EKG which shows sinus tachycardia at 112 beats per minute, no STEMI, no ischemic changes, normal intervals.   No acute change compared to prior tracing.  --   [RC]   1802 WBC(!): 15.32 [RC]   1819 Patient is a 32-year-old female diagnosed with obstructive uropathy (5 mm right obstructing 5 mm stone at UVJ), who returns with nausea, vomiting, fevers since last night.  Patient states she has not been able to tolerate any oral intake since this morning.  Here, patient had temperature 100.1° F at triage, tachycardic, with no CVA tenderness, no abdominal tenderness.    Initial differential included sepsis, pyelonephritis, infected obstructive uropathy.   I independently reviewed and interpreted labs which are notable for leukocytosis.  Urinalysis with 12 WBC/hpf, moderate bacteriuria, ketonuria.     Patient history, findings, results,  patient management discussed with urologist Dr. Raf MD who accepts the patient, requests admission to hospitalist, expects to perform procedure tonight (pt reports last ate about 8 hours ago, but vomited everything back up.) [RC]   1829 Dr. Carbone requests CT renal stone study to confirm retained stone.    [RC]   1835 Patient has a true medical need for admission/observation. I have discussed the patient history, exam, findings with hospitalist NP Isaias, who accepts the patient.         =====================================  Critical Care:  30 minutes total critical care time was personally spent by me, exclusive of procedures and separately billable time.   Critical care was necessary to treat or prevent imminent or life-threatening deterioration of the following conditions:  Sepsis, obstructive uropathy   =====================================     [RC]   2049 This patient does have evidence of infective focus  My overall impression is sepsis.  Source: Urinary Tract  Antibiotics given- Antibiotics (72h ago, onward)    Start     Stop Route Frequency Ordered    05/31/23 1800  cefTRIAXone (ROCEPHIN) 1 g in dextrose 5 % in water (D5W)   5 % 50 mL IVPB (MB+)         -- IV Every 24 hours (non-standard times) 05/30/23 1852      Latest lactate reviewed-  POC lactic 1.29  Organ dysfunction indicated by none    Fluid challenge Not needed - patient is not hypotensive      Post- resuscitation assessment No - Post resuscitation assessment not needed       Will Not start Pressors- Levophed for MAP of 65  Source control achieved by: antibiotics   [RC]      ED Course User Index  [RC] Zain Archuleta MD       I decided to obtain the patient's medical records. I reviewed patient's prior external notes / results: specialist note (4/18 urology note) .       Medications   acetaminophen tablet 1,000 mg (has no administration in time range)   ondansetron injection 4 mg (has no administration in time range)   sodium chloride  0.9% bolus 1,000 mL 1,000 mL (has no administration in time range)   cefTRIAXone (ROCEPHIN) 1 g in dextrose 5 % in water (D5W) 5 % 50 mL IVPB (MB+) (has no administration in time range)            No future appointments.     Diagnostic Impression:    1. Sepsis, due to unspecified organism, unspecified whether acute organ dysfunction present    2. Fever    3. Obstructive uropathy                  Zain Archuleta MD  05/30/23 2051

## 2023-05-31 LAB
ACINETOBACTER CALCOACETICUS/BAUMANNII COMPLEX: NOT DETECTED
ALBUMIN SERPL BCP-MCNC: 2.8 G/DL (ref 3.5–5.2)
ALP SERPL-CCNC: 37 U/L (ref 55–135)
ALT SERPL W/O P-5'-P-CCNC: 18 U/L (ref 10–44)
ANION GAP SERPL CALC-SCNC: 6 MMOL/L (ref 8–16)
AST SERPL-CCNC: 21 U/L (ref 10–40)
BACTEROIDES FRAGILIS: NOT DETECTED
BASOPHILS # BLD AUTO: 0.03 K/UL (ref 0–0.2)
BASOPHILS NFR BLD: 0.3 % (ref 0–1.9)
BILIRUB SERPL-MCNC: 0.6 MG/DL (ref 0.1–1)
BUN SERPL-MCNC: 9 MG/DL (ref 6–20)
CALCIUM SERPL-MCNC: 8.4 MG/DL (ref 8.7–10.5)
CANDIDA ALBICANS: NOT DETECTED
CANDIDA AURIS: NOT DETECTED
CANDIDA GLABRATA: NOT DETECTED
CANDIDA KRUSEI: NOT DETECTED
CANDIDA PARAPSILOSIS: NOT DETECTED
CANDIDA TROPICALIS: NOT DETECTED
CHLORIDE SERPL-SCNC: 108 MMOL/L (ref 95–110)
CO2 SERPL-SCNC: 24 MMOL/L (ref 23–29)
CREAT SERPL-MCNC: 0.9 MG/DL (ref 0.5–1.4)
CRYPTOCOCCUS NEOFORMANS/GATTII: NOT DETECTED
CTX-M GENE: NOT DETECTED
DIFFERENTIAL METHOD: ABNORMAL
ENTEROBACTER CLOACAE COMPLEX: NOT DETECTED
ENTEROBACTERALES: ABNORMAL
ENTEROCOCCUS FAECALIS: NOT DETECTED
ENTEROCOCCUS FAECIUM: NOT DETECTED
EOSINOPHIL # BLD AUTO: 0 K/UL (ref 0–0.5)
EOSINOPHIL NFR BLD: 0.1 % (ref 0–8)
ERYTHROCYTE [DISTWIDTH] IN BLOOD BY AUTOMATED COUNT: 12.5 % (ref 11.5–14.5)
ESCHERICHIA COLI: NOT DETECTED
EST. GFR  (NO RACE VARIABLE): >60 ML/MIN/1.73 M^2
GLUCOSE SERPL-MCNC: 122 MG/DL (ref 70–110)
HAEMOPHILUS INFLUENZAE: NOT DETECTED
HCT VFR BLD AUTO: 31.2 % (ref 37–48.5)
HGB BLD-MCNC: 10.6 G/DL (ref 12–16)
IMM GRANULOCYTES # BLD AUTO: 0.08 K/UL (ref 0–0.04)
IMM GRANULOCYTES NFR BLD AUTO: 0.7 % (ref 0–0.5)
IMP GENE: NOT DETECTED
KLEBSIELLA AEROGENES: NOT DETECTED
KLEBSIELLA OXYTOCA: NOT DETECTED
KLEBSIELLA PNEUMONIAE GROUP: DETECTED
KPC: NOT DETECTED
LACTATE SERPL-SCNC: 0.8 MMOL/L (ref 0.5–2.2)
LISTERIA MONOCYTOGENES: NOT DETECTED
LYMPHOCYTES # BLD AUTO: 0.7 K/UL (ref 1–4.8)
LYMPHOCYTES NFR BLD: 5.9 % (ref 18–48)
MAGNESIUM SERPL-MCNC: 1.5 MG/DL (ref 1.6–2.6)
MCH RBC QN AUTO: 30.2 PG (ref 27–31)
MCHC RBC AUTO-ENTMCNC: 34 G/DL (ref 32–36)
MCR-1: NOT DETECTED
MCV RBC AUTO: 89 FL (ref 82–98)
MEC A/C AND MREJ (MRSA): NOT DETECTED
MEC A/C: NOT DETECTED
MONOCYTES # BLD AUTO: 1.2 K/UL (ref 0.3–1)
MONOCYTES NFR BLD: 10.5 % (ref 4–15)
NDM GENE: NOT DETECTED
NEISSERIA MENINGITIDIS: NOT DETECTED
NEUTROPHILS # BLD AUTO: 9.5 K/UL (ref 1.8–7.7)
NEUTROPHILS NFR BLD: 82.5 % (ref 38–73)
NRBC BLD-RTO: 0 /100 WBC
OXA-48-LIKE: NOT DETECTED
PHOSPHATE SERPL-MCNC: 2.6 MG/DL (ref 2.7–4.5)
PLATELET # BLD AUTO: 121 K/UL (ref 150–450)
PLATELET BLD QL SMEAR: ABNORMAL
PMV BLD AUTO: 11.6 FL (ref 9.2–12.9)
POTASSIUM SERPL-SCNC: 3.4 MMOL/L (ref 3.5–5.1)
PROT SERPL-MCNC: 5.3 G/DL (ref 6–8.4)
PROTEUS SPECIES: NOT DETECTED
PSEUDOMONAS AERUGINOSA: NOT DETECTED
RBC # BLD AUTO: 3.51 M/UL (ref 4–5.4)
SALMONELLA SP: NOT DETECTED
SERRATIA MARCESCENS: NOT DETECTED
SODIUM SERPL-SCNC: 138 MMOL/L (ref 136–145)
STAPHYLOCOCCUS AUREUS: NOT DETECTED
STAPHYLOCOCCUS EPIDERMIDIS: NOT DETECTED
STAPHYLOCOCCUS LUGDUNESIS: NOT DETECTED
STAPHYLOCOCCUS SPECIES: NOT DETECTED
STENOTROPHOMONAS MALTOPHILIA: NOT DETECTED
STREPTOCOCCUS AGALACTIAE: NOT DETECTED
STREPTOCOCCUS PNEUMONIAE: NOT DETECTED
STREPTOCOCCUS PYOGENES: NOT DETECTED
STREPTOCOCCUS SPECIES: NOT DETECTED
VAN A/B: NOT DETECTED
VIM GENE: NOT DETECTED
WBC # BLD AUTO: 11.47 K/UL (ref 3.9–12.7)

## 2023-05-31 PROCEDURE — 99222 1ST HOSP IP/OBS MODERATE 55: CPT | Mod: ,,, | Performed by: UROLOGY

## 2023-05-31 PROCEDURE — 63600175 PHARM REV CODE 636 W HCPCS: Performed by: HOSPITALIST

## 2023-05-31 PROCEDURE — 85025 COMPLETE CBC W/AUTO DIFF WBC: CPT | Performed by: NURSE PRACTITIONER

## 2023-05-31 PROCEDURE — 36415 COLL VENOUS BLD VENIPUNCTURE: CPT | Performed by: NURSE PRACTITIONER

## 2023-05-31 PROCEDURE — 83735 ASSAY OF MAGNESIUM: CPT | Performed by: NURSE PRACTITIONER

## 2023-05-31 PROCEDURE — 80053 COMPREHEN METABOLIC PANEL: CPT | Performed by: NURSE PRACTITIONER

## 2023-05-31 PROCEDURE — 25000003 PHARM REV CODE 250: Performed by: HOSPITALIST

## 2023-05-31 PROCEDURE — 99222 PR INITIAL HOSPITAL CARE,LEVL II: ICD-10-PCS | Mod: ,,, | Performed by: UROLOGY

## 2023-05-31 PROCEDURE — 25000003 PHARM REV CODE 250: Performed by: NURSE PRACTITIONER

## 2023-05-31 PROCEDURE — 83605 ASSAY OF LACTIC ACID: CPT | Performed by: NURSE PRACTITIONER

## 2023-05-31 PROCEDURE — 84100 ASSAY OF PHOSPHORUS: CPT | Performed by: NURSE PRACTITIONER

## 2023-05-31 PROCEDURE — 63600175 PHARM REV CODE 636 W HCPCS: Performed by: STUDENT IN AN ORGANIZED HEALTH CARE EDUCATION/TRAINING PROGRAM

## 2023-05-31 PROCEDURE — 11000001 HC ACUTE MED/SURG PRIVATE ROOM

## 2023-05-31 PROCEDURE — 99233 PR SUBSEQUENT HOSPITAL CARE,LEVL III: ICD-10-PCS | Mod: ,,, | Performed by: HOSPITALIST

## 2023-05-31 PROCEDURE — 99233 SBSQ HOSP IP/OBS HIGH 50: CPT | Mod: ,,, | Performed by: HOSPITALIST

## 2023-05-31 PROCEDURE — 94761 N-INVAS EAR/PLS OXIMETRY MLT: CPT

## 2023-05-31 RX ORDER — KETOROLAC TROMETHAMINE 30 MG/ML
15 INJECTION, SOLUTION INTRAMUSCULAR; INTRAVENOUS EVERY 6 HOURS PRN
Status: DISCONTINUED | OUTPATIENT
Start: 2023-05-31 | End: 2023-06-01

## 2023-05-31 RX ORDER — OXYCODONE HYDROCHLORIDE 5 MG/1
5 TABLET ORAL EVERY 4 HOURS PRN
Status: DISCONTINUED | OUTPATIENT
Start: 2023-05-31 | End: 2023-06-02 | Stop reason: HOSPADM

## 2023-05-31 RX ORDER — ACETAMINOPHEN 500 MG
1000 TABLET ORAL EVERY 8 HOURS PRN
Status: DISCONTINUED | OUTPATIENT
Start: 2023-05-31 | End: 2023-06-02 | Stop reason: HOSPADM

## 2023-05-31 RX ORDER — OXYCODONE HYDROCHLORIDE 5 MG/1
5 TABLET ORAL EVERY 4 HOURS PRN
Status: DISCONTINUED | OUTPATIENT
Start: 2023-05-31 | End: 2023-05-31

## 2023-05-31 RX ORDER — MAGNESIUM SULFATE HEPTAHYDRATE 40 MG/ML
2 INJECTION, SOLUTION INTRAVENOUS ONCE
Status: COMPLETED | OUTPATIENT
Start: 2023-05-31 | End: 2023-05-31

## 2023-05-31 RX ORDER — SODIUM CHLORIDE AND POTASSIUM CHLORIDE 300; 900 MG/100ML; MG/100ML
INJECTION, SOLUTION INTRAVENOUS CONTINUOUS
Status: DISCONTINUED | OUTPATIENT
Start: 2023-05-31 | End: 2023-05-31

## 2023-05-31 RX ADMIN — MAGNESIUM SULFATE HEPTAHYDRATE 2 G: 40 INJECTION, SOLUTION INTRAVENOUS at 09:05

## 2023-05-31 RX ADMIN — CEFTRIAXONE SODIUM 2 G: 2 INJECTION, POWDER, FOR SOLUTION INTRAMUSCULAR; INTRAVENOUS at 05:05

## 2023-05-31 RX ADMIN — KETOROLAC TROMETHAMINE 15 MG: 30 INJECTION, SOLUTION INTRAMUSCULAR; INTRAVENOUS at 10:05

## 2023-05-31 RX ADMIN — KETOROLAC TROMETHAMINE 15 MG: 30 INJECTION, SOLUTION INTRAMUSCULAR; INTRAVENOUS at 08:05

## 2023-05-31 RX ADMIN — ACETAMINOPHEN 1000 MG: 500 TABLET, FILM COATED ORAL at 08:05

## 2023-05-31 RX ADMIN — ACETAMINOPHEN 1000 MG: 500 TABLET, FILM COATED ORAL at 02:05

## 2023-05-31 RX ADMIN — KETOROLAC TROMETHAMINE 15 MG: 30 INJECTION, SOLUTION INTRAMUSCULAR; INTRAVENOUS at 01:05

## 2023-05-31 RX ADMIN — SODIUM CHLORIDE AND POTASSIUM CHLORIDE: 9; 2.98 INJECTION, SOLUTION INTRAVENOUS at 11:05

## 2023-05-31 RX ADMIN — TAMSULOSIN HYDROCHLORIDE 0.4 MG: 0.4 CAPSULE ORAL at 08:05

## 2023-05-31 RX ADMIN — ACETAMINOPHEN 650 MG: 325 TABLET, FILM COATED ORAL at 12:05

## 2023-05-31 RX ADMIN — KETOROLAC TROMETHAMINE 15 MG: 30 INJECTION, SOLUTION INTRAMUSCULAR; INTRAVENOUS at 02:05

## 2023-05-31 NOTE — SUBJECTIVE & OBJECTIVE
Past Medical History:   Diagnosis Date    H/O elbow surgery     History of pericarditis     Oral contraceptive use        Past Surgical History:   Procedure Laterality Date    left elbow Left 03/30/2017    torn ligament       Review of patient's allergies indicates:   Allergen Reactions    No known drug allergies        No current facility-administered medications on file prior to encounter.     Current Outpatient Medications on File Prior to Encounter   Medication Sig    ketorolac (TORADOL) 10 mg tablet Take 1 tablet (10 mg total) by mouth every 6 (six) hours as needed for Pain.    ondansetron (ZOFRAN-ODT) 4 MG TbDL Take 1 tablet (4 mg total) by mouth every 6 (six) hours as needed (nausea).    oxyCODONE-acetaminophen (PERCOCET) 5-325 mg per tablet Take 1 tablet by mouth every 6 (six) hours as needed for Pain.    tamsulosin (FLOMAX) 0.4 mg Cap Take 1 capsule daily until stone passes    tretinoin (RETIN-A) 0.05 % cream SMARTSIG:Topical Every Evening    [DISCONTINUED] ondansetron (ZOFRAN-ODT) 4 MG TbDL Take 1 tablet (4 mg total) by mouth every 6 (six) hours as needed (nausea).     Family History       Problem Relation (Age of Onset)    Breast cancer Other    Heart attack Maternal Grandfather    No Known Problems Maternal Grandmother, Sister, Paternal Grandmother, Paternal Grandfather    Skin cancer Other          Tobacco Use    Smoking status: Never    Smokeless tobacco: Never   Substance and Sexual Activity    Alcohol use: Yes     Comment: occasionally     Drug use: No    Sexual activity: Yes     Partners: Male     Review of Systems   Constitutional:  Positive for activity change and appetite change. Negative for fever.   HENT:  Negative for congestion, ear pain, rhinorrhea and sinus pressure.    Eyes:  Negative for pain and discharge.   Respiratory:  Negative for cough, chest tightness, shortness of breath and wheezing.    Cardiovascular:  Negative for chest pain and leg swelling.   Gastrointestinal:  Positive for  nausea and vomiting. Negative for abdominal distention, abdominal pain and diarrhea.   Endocrine: Negative for cold intolerance and heat intolerance.   Genitourinary:  Positive for flank pain. Negative for difficulty urinating, frequency, hematuria and urgency.   Musculoskeletal:  Negative for arthralgias, joint swelling and myalgias.   Allergic/Immunologic: Negative for environmental allergies and food allergies.   Neurological:  Negative for dizziness, weakness, light-headedness and headaches.   Hematological:  Does not bruise/bleed easily.   Psychiatric/Behavioral:  Negative for agitation, behavioral problems and decreased concentration.    Objective:     Vital Signs (Most Recent):  Temp: 98.9 °F (37.2 °C) (05/30/23 2013)  Pulse: 103 (05/30/23 2013)  Resp: 18 (05/30/23 2013)  BP: 113/61 (05/30/23 2013)  SpO2: 98 % (05/30/23 2013) Vital Signs (24h Range):  Temp:  [98.9 °F (37.2 °C)-100.1 °F (37.8 °C)] 98.9 °F (37.2 °C)  Pulse:  [103-125] 103  Resp:  [18] 18  SpO2:  [98 %-100 %] 98 %  BP: (113-119)/(61-63) 113/61     Weight: 56.7 kg (125 lb)  Body mass index is 22.14 kg/m².     Physical Exam  Constitutional:       Appearance: Normal appearance. She is well-developed.   HENT:      Head: Normocephalic.   Eyes:      General:         Right eye: No discharge.         Left eye: No discharge.      Conjunctiva/sclera: Conjunctivae normal.   Cardiovascular:      Rate and Rhythm: Regular rhythm. Tachycardia present.      Pulses:           Radial pulses are 2+ on the right side and 2+ on the left side.      Heart sounds: Normal heart sounds.   Pulmonary:      Effort: Pulmonary effort is normal. No respiratory distress.      Breath sounds: Normal breath sounds.   Abdominal:      General: Bowel sounds are normal. There is no distension.      Palpations: Abdomen is soft.   Musculoskeletal:         General: Normal range of motion.      Cervical back: Normal range of motion and neck supple.   Skin:     General: Skin is warm and  dry.      Capillary Refill: Capillary refill takes less than 2 seconds.   Neurological:      Mental Status: She is alert and oriented to person, place, and time.      GCS: GCS eye subscore is 4. GCS verbal subscore is 5. GCS motor subscore is 6.      Motor: Motor function is intact.   Psychiatric:         Mood and Affect: Mood normal.         Speech: Speech normal.         Behavior: Behavior normal.              Significant Labs: All pertinent labs within the past 24 hours have been reviewed.  CBC:   Recent Labs   Lab 05/29/23  1313 05/30/23  1747   WBC 8.65 15.32*   HGB 14.0 13.6   HCT 40.6 39.8    165     CMP:   Recent Labs   Lab 05/29/23  1319 05/30/23  1747    136   K 3.5 3.4*    104   CO2 20* 22*    105   BUN 9 9   CREATININE 0.8 0.9   CALCIUM 9.8 9.3   PROT  --  7.1   ALBUMIN  --  3.9   BILITOT  --  1.2*   ALKPHOS  --  42*   AST  --  22   ALT  --  21   ANIONGAP 12 10       Significant Imaging: I have reviewed all pertinent imaging results/findings within the past 24 hours.

## 2023-05-31 NOTE — PLAN OF CARE
Problem: Adult Inpatient Plan of Care  Goal: Plan of Care Review  Outcome: Ongoing, Progressing  Goal: Patient-Specific Goal (Individualized)  Outcome: Ongoing, Progressing  Goal: Absence of Hospital-Acquired Illness or Injury  Outcome: Ongoing, Progressing  Goal: Optimal Comfort and Wellbeing  Outcome: Ongoing, Progressing  Goal: Readiness for Transition of Care  Outcome: Ongoing, Progressing     Problem: Fatigue  Goal: Improved Activity Tolerance  Outcome: Ongoing, Progressing     Problem: Fever  Goal: Body Temperature in Desired Range  Outcome: Ongoing, Progressing

## 2023-05-31 NOTE — HPI
33 yo female presented to [facility] [place] overnight for right flank pain, frequency, urgency, dysuria, fever, and N/V. During workup in the ED, the patient passed a 5 mm kidney stone which was subsequently sent off for analysis. CT prior shows mild right hydronephrosis with a 5 mm calcification in the dependent portion of the bladder. Several hours following, she experienced a 102.3 fever at midnight. WBC of 15, lactate normal, cr 0.9. UA shows 1 RBC, 12 WBC, moderate bacteria. Urine and blood cultures pending. She is currently receiving rocephin empirically and mIVF.    Nausea, vomiting, flank pain have all resolved. She continues to have some frequency and urgency but it has improved significantly. Not currently feeling fevers/chills.

## 2023-05-31 NOTE — H&P
"[address] Medicine  History & Physical    Patient Name: Melinda Parekh  MRN: [id number]  Patient Class: IP- Inpatient  Admission Date: 5/30/2023  Attending Physician: Pablo Sheridan MD   Primary Care Provider: Savage Patterson MD         Patient information was obtained from patient, past medical records and ER records.     Subjective:     Principal Problem:Pyelonephritis    Chief Complaint:   Chief Complaint   Patient presents with    Fever     Pt reports fever x2 days and RLQ abd pain. States she was seen yesterday for kidney stones and discharged. Reports Tmax 104 earlier today. State she took Tylenol but then vomited after taking. Reports blood in urine, unsure if she has passed any part of stone.        HPI: The patient is a 32 year old female with no significant past medical history presents with fevers since last night associated with right flank pain, lower abdominal pain.  She notes sensation of incomplete void, urinary frequency.  She denies dysuria.  She states that her fevers as been as high as 104F.  She was seen here yesterday for flank and abdominal pain.  She was diagnosed with "5 mm obstructing calculus at the right ureteral vesicular junction with mild-moderate right hydroureteronephrosis." She was discharged with tamsulosin, Norco, ketorolac.  She reports that pain has not improved with these.  She started vomiting overnight.  The patient will be admitted for further management of her acute pyelonephritis and Urology consult.      Past Medical History:   Diagnosis Date    H/O elbow surgery     History of pericarditis     Oral contraceptive use        Past Surgical History:   Procedure Laterality Date    left elbow Left 03/30/2017    torn ligament       Review of patient's allergies indicates:   Allergen Reactions    No known drug allergies        No current facility-administered medications on file prior to encounter.     Current Outpatient Medications on " File Prior to Encounter   Medication Sig    ketorolac (TORADOL) 10 mg tablet Take 1 tablet (10 mg total) by mouth every 6 (six) hours as needed for Pain.    ondansetron (ZOFRAN-ODT) 4 MG TbDL Take 1 tablet (4 mg total) by mouth every 6 (six) hours as needed (nausea).    oxyCODONE-acetaminophen (PERCOCET) 5-325 mg per tablet Take 1 tablet by mouth every 6 (six) hours as needed for Pain.    tamsulosin (FLOMAX) 0.4 mg Cap Take 1 capsule daily until stone passes    tretinoin (RETIN-A) 0.05 % cream SMARTSIG:Topical Every Evening    [DISCONTINUED] ondansetron (ZOFRAN-ODT) 4 MG TbDL Take 1 tablet (4 mg total) by mouth every 6 (six) hours as needed (nausea).     Family History       Problem Relation (Age of Onset)    Breast cancer Other    Heart attack Maternal Grandfather    No Known Problems Maternal Grandmother, Sister, Paternal Grandmother, Paternal Grandfather    Skin cancer Other          Tobacco Use    Smoking status: Never    Smokeless tobacco: Never   Substance and Sexual Activity    Alcohol use: Yes     Comment: occasionally     Drug use: No    Sexual activity: Yes     Partners: Male     Review of Systems   Constitutional:  Positive for activity change and appetite change. Negative for fever.   HENT:  Negative for congestion, ear pain, rhinorrhea and sinus pressure.    Eyes:  Negative for pain and discharge.   Respiratory:  Negative for cough, chest tightness, shortness of breath and wheezing.    Cardiovascular:  Negative for chest pain and leg swelling.   Gastrointestinal:  Positive for nausea and vomiting. Negative for abdominal distention, abdominal pain and diarrhea.   Endocrine: Negative for cold intolerance and heat intolerance.   Genitourinary:  Positive for flank pain. Negative for difficulty urinating, frequency, hematuria and urgency.   Musculoskeletal:  Negative for arthralgias, joint swelling and myalgias.   Allergic/Immunologic: Negative for environmental allergies and food allergies.    Neurological:  Negative for dizziness, weakness, light-headedness and headaches.   Hematological:  Does not bruise/bleed easily.   Psychiatric/Behavioral:  Negative for agitation, behavioral problems and decreased concentration.    Objective:     Vital Signs (Most Recent):  Temp: 98.9 °F (37.2 °C) (05/30/23 2013)  Pulse: 103 (05/30/23 2013)  Resp: 18 (05/30/23 2013)  BP: 113/61 (05/30/23 2013)  SpO2: 98 % (05/30/23 2013) Vital Signs (24h Range):  Temp:  [98.9 °F (37.2 °C)-100.1 °F (37.8 °C)] 98.9 °F (37.2 °C)  Pulse:  [103-125] 103  Resp:  [18] 18  SpO2:  [98 %-100 %] 98 %  BP: (113-119)/(61-63) 113/61     Weight: 56.7 kg (125 lb)  Body mass index is 22.14 kg/m².     Physical Exam  Constitutional:       Appearance: Normal appearance. She is well-developed.   HENT:      Head: Normocephalic.   Eyes:      General:         Right eye: No discharge.         Left eye: No discharge.      Conjunctiva/sclera: Conjunctivae normal.   Cardiovascular:      Rate and Rhythm: Regular rhythm. Tachycardia present.      Pulses:           Radial pulses are 2+ on the right side and 2+ on the left side.      Heart sounds: Normal heart sounds.   Pulmonary:      Effort: Pulmonary effort is normal. No respiratory distress.      Breath sounds: Normal breath sounds.   Abdominal:      General: Bowel sounds are normal. There is no distension.      Palpations: Abdomen is soft.   Musculoskeletal:         General: Normal range of motion.      Cervical back: Normal range of motion and neck supple.   Skin:     General: Skin is warm and dry.      Capillary Refill: Capillary refill takes less than 2 seconds.   Neurological:      Mental Status: She is alert and oriented to person, place, and time.      GCS: GCS eye subscore is 4. GCS verbal subscore is 5. GCS motor subscore is 6.      Motor: Motor function is intact.   Psychiatric:         Mood and Affect: Mood normal.         Speech: Speech normal.         Behavior: Behavior normal.               Significant Labs: All pertinent labs within the past 24 hours have been reviewed.  CBC:   Recent Labs   Lab 05/29/23  1313 05/30/23  1747   WBC 8.65 15.32*   HGB 14.0 13.6   HCT 40.6 39.8    165     CMP:   Recent Labs   Lab 05/29/23  1319 05/30/23  1747    136   K 3.5 3.4*    104   CO2 20* 22*    105   BUN 9 9   CREATININE 0.8 0.9   CALCIUM 9.8 9.3   PROT  --  7.1   ALBUMIN  --  3.9   BILITOT  --  1.2*   ALKPHOS  --  42*   AST  --  22   ALT  --  21   ANIONGAP 12 10       Significant Imaging: I have reviewed all pertinent imaging results/findings within the past 24 hours.    Assessment/Plan:     * Pyelonephritis  Patient febrile, tachycardic. Moderate bacteria and leukocytes on U/A.      Rocephin  IVF  Urine culture pending       Hydronephrosis with urinary obstruction due to ureteral calculus  Patient passed the stone on arrival to M/S room.  Pain relieved.    Urinary stone analysis  IVF  Consult Urology        VTE Risk Mitigation (From admission, onward)         Ordered     enoxaparin injection 40 mg  Daily         05/30/23 1851     IP VTE LOW RISK PATIENT  Once         05/30/23 1851     Place sequential compression device  Until discontinued         05/30/23 1851                           Juaquin Esparza NP  Department of Hospital Medicine  [facility] ([address])

## 2023-05-31 NOTE — SUBJECTIVE & OBJECTIVE
Past Medical History:   Diagnosis Date    H/O elbow surgery     History of pericarditis     Oral contraceptive use        Past Surgical History:   Procedure Laterality Date    left elbow Left 03/30/2017    torn ligament       Review of patient's allergies indicates:   Allergen Reactions    No known drug allergies        Family History       Problem Relation (Age of Onset)    Breast cancer Other    Heart attack Maternal Grandfather    No Known Problems Maternal Grandmother, Sister, Paternal Grandmother, Paternal Grandfather    Skin cancer Other            Tobacco Use    Smoking status: Never    Smokeless tobacco: Never   Substance and Sexual Activity    Alcohol use: Yes     Comment: occasionally     Drug use: No    Sexual activity: Yes     Partners: Male       Review of Systems   Constitutional:  Positive for fever. Negative for appetite change, chills, fatigue and unexpected weight change.   HENT: Negative.     Eyes: Negative.    Respiratory:  Negative for cough, chest tightness and shortness of breath.    Cardiovascular:  Negative for chest pain, palpitations and leg swelling.   Gastrointestinal:  Negative for abdominal pain, constipation, diarrhea, nausea and vomiting.   Genitourinary:  Positive for dysuria, frequency and urgency. Negative for difficulty urinating, flank pain and hematuria.   Musculoskeletal:  Negative for back pain.   Skin:  Negative for rash.   Neurological:  Negative for dizziness, syncope, numbness and headaches.   Hematological:  Does not bruise/bleed easily.   Psychiatric/Behavioral: Negative.       Objective:     Temp:  [98.7 °F (37.1 °C)-102.3 °F (39.1 °C)] 99 °F (37.2 °C)  Pulse:  [] 105  Resp:  [16-20] 16  SpO2:  [96 %-100 %] 99 %  BP: (101-146)/(51-63) 104/51  Weight: 56.7 kg (125 lb)  Body mass index is 22.14 kg/m².           Drains       None                    Physical Exam  Constitutional:       General: She is not in acute distress.     Appearance: She is well-developed.    Eyes:      General: No scleral icterus.  Cardiovascular:      Rate and Rhythm: Normal rate.   Pulmonary:      Effort: Pulmonary effort is normal. No respiratory distress.   Abdominal:      General: Bowel sounds are normal. There is no distension.      Palpations: Abdomen is soft.      Comments: No CVA tenderness.   Genitourinary:     Comments: No CVAT  Musculoskeletal:         General: Normal range of motion.   Skin:     General: Skin is warm and dry.      Findings: No rash.   Neurological:      Mental Status: She is alert and oriented to person, place, and time.   Psychiatric:         Behavior: Behavior normal.        Significant Labs:    BMP:  Recent Labs   Lab 05/29/23  1319 05/30/23  1747 05/31/23  0429    136 138   K 3.5 3.4* 3.4*    104 108   CO2 20* 22* 24   BUN 9 9 9   CREATININE 0.8 0.9 0.9   CALCIUM 9.8 9.3 8.4*       CBC:  Recent Labs   Lab 05/29/23  1313 05/30/23  1747 05/31/23  0429   WBC 8.65 15.32* 11.47   HGB 14.0 13.6 10.6*   HCT 40.6 39.8 31.2*    165 121*       Blood Culture:   Recent Labs   Lab 05/30/23  1747 05/30/23  1843   LABBLOO Gram stain ernestina bottle: Gram negative rods  Results called to and read back by: Liseth Davis RN 05/31/2023  09:46 Gram stain ernestina bottle: Gram negative rods  Results called to and read back by: Liseth Davis RN 05/31/2023  09:46     Urine Culture: No results for input(s): LABURIN in the last 168 hours.  Urine Studies:   Recent Labs   Lab 05/29/23  1250 05/30/23  1724   COLORU Yellow Colorless*   APPEARANCEUA Hazy* Clear   PHUR 8.0 7.0   SPECGRAV 1.015 <1.005*   PROTEINUA Negative Negative   GLUCUA Negative Negative   KETONESU Negative Trace*   BILIRUBINUA Negative Negative   OCCULTUA Negative 2+*   NITRITE Negative Negative   UROBILINOGEN Negative Negative   LEUKOCYTESUR Negative 3+*   RBCUA 1 1   WBCUA 2 12*   BACTERIA Occasional Moderate*   SQUAMEPITHEL  --  5       Significant Imaging:  CT: I have reviewed all results within the past 24 hours  and my personal findings are:  see HPI

## 2023-05-31 NOTE — PROGRESS NOTES
"[facility] - [address] Medicine  Progress Note    Patient Name: Melinda Parekh  MRN: [id number]  Patient Class: IP- Inpatient   Admission Date: 5/30/2023  Length of Stay: 1 days  Attending Physician: Pablo Sheridan MD  Primary Care Provider: Savage Patterson MD        Subjective:     Principal Problem:Pyelonephritis        HPI:  Per Juaquin Esparza, NP:    "The patient is a 32 year old female with no significant past medical history presents with fevers since last night associated with right flank pain, lower abdominal pain.  She notes sensation of incomplete void, urinary frequency.  She denies dysuria.  She states that her fevers as been as high as 104F.  She was seen here yesterday for flank and abdominal pain.  She was diagnosed with "5 mm obstructing calculus at the right ureteral vesicular junction with mild-moderate right hydroureteronephrosis." She was discharged with tamsulosin, Norco, ketorolac.  She reports that pain has not improved with these.  She started vomiting overnight.  The patient will be admitted for further management of her acute pyelonephritis and Urology consult."      Overview/Hospital Course:  Patient is a 32-year-old woman presents with evidence of sepsis secondary to pyelonephritis.    CT scan shows with right perinephric and periuretreral stranding along with mild right hydronephrosis which appears improved compared to prior study.  There is a renal stone that is in the bladder.    Urinalysis reveals moderate bacteria with 3+ white blood cells.  Initial white blood cell count yesterday elevated at 15 K/uL with neutrophil predominance.  Patient with fever.    Blood and urine cultures checked.  Patient started on intravenous antibiotics with ceftriaxone.  Urology consulted.  Patient passed obstructing kidney stone on her own.  No urological intervention and recommended.    Patient continues to spike fevers and blood cultures positive for Gram-negative " rods.      Interval History: Blood cultures positive for gram negative rods (2/2).  Fevers.  Severe flank pain resolved after passing kidney stone.  Mild tenderness to right flank persists.    Review of Systems   Constitutional:  Positive for fever. Negative for chills.   Respiratory:  Negative for shortness of breath.    Cardiovascular:  Negative for chest pain.   Gastrointestinal:  Negative for abdominal pain, nausea and vomiting.   Genitourinary:  Positive for flank pain. Negative for dysuria and frequency.     Objective:     Vital Signs (Most Recent):  Temp: 99.8 °F (37.7 °C) (05/31/23 1531)  Pulse: 101 (05/31/23 1531)  Resp: 20 (05/31/23 1531)  BP: (!) 103/58 (05/31/23 1531)  SpO2: 99 % (05/31/23 1531) Vital Signs (24h Range):  Temp:  [98.7 °F (37.1 °C)-102.3 °F (39.1 °C)] 99.8 °F (37.7 °C)  Pulse:  [] 101  Resp:  [16-20] 20  SpO2:  [96 %-100 %] 99 %  BP: (101-146)/(51-67) 103/58     Weight: 56.7 kg (125 lb)  Body mass index is 22.14 kg/m².    Intake/Output Summary (Last 24 hours) at 5/31/2023 1649  Last data filed at 5/31/2023 1410  Gross per 24 hour   Intake 240 ml   Output 300 ml   Net -60 ml         Physical Exam  Constitutional:       General: She is not in acute distress.  HENT:      Head: Atraumatic.   Eyes:      Conjunctiva/sclera: Conjunctivae normal.   Cardiovascular:      Rate and Rhythm: Regular rhythm. Tachycardia present.      Heart sounds: Normal heart sounds. No murmur heard.  Pulmonary:      Effort: Pulmonary effort is normal.      Breath sounds: Normal breath sounds. No wheezing.   Abdominal:      General: Bowel sounds are normal. There is no distension.      Palpations: Abdomen is soft.      Tenderness: There is abdominal tenderness in the right lower quadrant. There is right CVA tenderness.   Musculoskeletal:         General: No deformity. Normal range of motion.      Cervical back: Neck supple.   Neurological:      Mental Status: She is alert and oriented to person, place, and time.            Significant Labs: All pertinent labs within the past 24 hours have been reviewed.    Significant Imaging: I have reviewed all pertinent imaging results/findings within the past 24 hours.      Assessment/Plan:      * Pyelonephritis  Patient with sepsis secondary to urinary tract infection with right pyelonephritis with obstructing right kidney stone with Gram-negative rods bacteremia.  Patient passed right kidney stone on her own.  No urological intervention recommended.  Overall clinically improving with decreasing white blood cell count and otherwise hemodynamically stable.  Fevers however persists.  Increase dose of ceftriaxone to 2 g every 24 hours.  Repeat blood cultures tomorrow morning.  Follow up on sensitivities to see if patient can potentially be transitioned to oral fluoroquinolones (QTc interval acceptable).    Hydronephrosis with urinary obstruction due to ureteral calculus  Hydronephrosis should resolve with alleviation of obstruction with passive for kidney stone.  Encourage oral hydration.      VTE Risk Mitigation (From admission, onward)         Ordered     enoxaparin injection 40 mg  Daily         05/30/23 1851     IP VTE LOW RISK PATIENT  Once         05/30/23 1851     Place sequential compression device  Until discontinued         05/30/23 1851                Pablo Sheridan MD  Department of Hospital Medicine   [facility] ([address])

## 2023-05-31 NOTE — SUBJECTIVE & OBJECTIVE
Interval History: Blood cultures positive for gram negative rods (2/2).  Fevers.  Severe flank pain resolved after passing kidney stone.  Mild tenderness to right flank persists.    Review of Systems   Constitutional:  Positive for fever. Negative for chills.   Respiratory:  Negative for shortness of breath.    Cardiovascular:  Negative for chest pain.   Gastrointestinal:  Negative for abdominal pain, nausea and vomiting.   Genitourinary:  Positive for flank pain. Negative for dysuria and frequency.     Objective:     Vital Signs (Most Recent):  Temp: 99.8 °F (37.7 °C) (05/31/23 1531)  Pulse: 101 (05/31/23 1531)  Resp: 20 (05/31/23 1531)  BP: (!) 103/58 (05/31/23 1531)  SpO2: 99 % (05/31/23 1531) Vital Signs (24h Range):  Temp:  [98.7 °F (37.1 °C)-102.3 °F (39.1 °C)] 99.8 °F (37.7 °C)  Pulse:  [] 101  Resp:  [16-20] 20  SpO2:  [96 %-100 %] 99 %  BP: (101-146)/(51-67) 103/58     Weight: 56.7 kg (125 lb)  Body mass index is 22.14 kg/m².    Intake/Output Summary (Last 24 hours) at 5/31/2023 1649  Last data filed at 5/31/2023 1410  Gross per 24 hour   Intake 240 ml   Output 300 ml   Net -60 ml         Physical Exam  Constitutional:       General: She is not in acute distress.  HENT:      Head: Atraumatic.   Eyes:      Conjunctiva/sclera: Conjunctivae normal.   Cardiovascular:      Rate and Rhythm: Regular rhythm. Tachycardia present.      Heart sounds: Normal heart sounds. No murmur heard.  Pulmonary:      Effort: Pulmonary effort is normal.      Breath sounds: Normal breath sounds. No wheezing.   Abdominal:      General: Bowel sounds are normal. There is no distension.      Palpations: Abdomen is soft.      Tenderness: There is abdominal tenderness in the right lower quadrant. There is right CVA tenderness.   Musculoskeletal:         General: No deformity. Normal range of motion.      Cervical back: Neck supple.   Neurological:      Mental Status: She is alert and oriented to person, place, and time.            Significant Labs: All pertinent labs within the past 24 hours have been reviewed.    Significant Imaging: I have reviewed all pertinent imaging results/findings within the past 24 hours.

## 2023-05-31 NOTE — HOSPITAL COURSE
Patient is a 32-year-old woman presents with evidence of sepsis secondary to pyelonephritis.  CT scan shows with right perinephric and periuretreral stranding along with mild right hydronephrosis which appears improved compared to prior study.  There is a renal stone that is in the bladder.  Urinalysis reveals moderate bacteria with 3+ white blood cells.  Initial white blood cell count yesterday elevated at 15 K/uL with neutrophil predominance.  Patient with fever.  Blood and urine cultures checked.  Patient volume resuscitated with intravenous fluids.  Patient started on intravenous antibiotics with ceftriaxone.      Urology consulted.  Patient passed obstructing kidney stone on her own.  No urological intervention and recommended.    Patient clinically improved with intravenous ceftriaxone.  Leukocytosis resolved.  Fever trend improved.  Patient has been hemodynamically stable.  Patient tolerating oral intake well and otherwise feeling well now.  Blood cultures and urine culture positive for Klebsiella pneumoniae.  Repeat blood culture collected on 6/1/2023 with growth today > 48 hours.  Culture shows sensitivity to fluoroquinolones.  QTc not elevated.  Risks and benefits of fluoroquinolone therapy discussed.      Patient advised to avoiding contact sports.  Patient told to discontinue any further ondansetron use and to not to take any other prescription medications or over-the-counter medications without consulting to ensure that it may not prolonged QTC interval while patient remains on fluoroquinolone therapy.  Patient discharged to finish 14 day course of antibiotics total with an additional 10 days of oral levofloxacin.    Patient advised to return to the hospital in the event of persistent fever, nausea, vomiting, or if she otherwise fails to improve day to day.  Close outpatient follow-up advised to ensure resolution of infection and for consideration of possible treatment to reduce risk of recurrent kidney  stones.  Patient advised to ensure adequate intake of water and titrate to maintain faint yellow colored or clear urine.

## 2023-05-31 NOTE — PROGRESS NOTES
VSS. Evelia diet. Voiding well. R AC SL intact. IV fluids dc'd. Rocephin increased to 2g-- Blood cultures X2 anaerobic bottles gram - rods. Telemetry dc'd. Up ad cronelio. Tylenol given X1 for temp.  supportive. Pleasant.

## 2023-05-31 NOTE — PLAN OF CARE
Initial Discharge Planning Assessment:5/31/23    Patient admitted on:5/30/23    Chart reviewed, Care plan discussed with treatment team, attending     PCP updated in Epic: correct in Epic    Pharmacy updated in [facility] in [place]    DME at home:none    Current Dispo:home    Transportation:has reliable    POA/LW:none on file      Anticipated DC needs from CM perspecrive:none   05/31/23 0819   Discharge Assessment   Assessment Type Discharge Planning Assessment   Confirmed/corrected address, phone number and insurance Yes   Confirmed Demographics Correct on Facesheet   Source of Information patient   When was your last doctors appointment?   (not recently)   Communicated SUPA with patient/caregiver Date not available/Unable to determine   Reason For Admission Pyelonephritis   People in Home spouse   Facility Arrived From: home   Do you expect to return to your current living situation? Yes   Do you have help at home or someone to help you manage your care at home? Yes   Who are your caregiver(s) and their phone number(s)? Ryan Parekh, spouse, [phone]   Prior to hospitilization cognitive status: Alert/Oriented;No Deficits   Current cognitive status: Alert/Oriented;No Deficits   Home Layout Able to live on 1st floor   Equipment Currently Used at Home none   Readmission within 30 days? No   Patient currently being followed by outpatient case management? No   Do you currently have service(s) that help you manage your care at home? No   Do you take prescription medications? No   Do you have any problems affording any of your prescribed medications? No   Who is going to help you get home at discharge? spouse, Ryansofya Parekh   How do you get to doctors appointments? family or friend will provide;car, drives self   Are you on dialysis? No   Do you take coumadin? No   Discharge Plan A Home   Discharge Plan B Home   DME Needed Upon Discharge  none   Discharge Plan discussed with: Patient;Spouse/sig other    Name(s) and Number(s) Ryan Parekh   Transition of Care Barriers None   Physical Activity   On average, how many days per week do you engage in moderate to strenuous exercise (like a brisk walk)? 3 days   On average, how many minutes do you engage in exercise at this level? 30 min   Financial Resource Strain   How hard is it for you to pay for the very basics like food, housing, medical care, and heating? Not hard   Housing Stability   In the last 12 months, was there a time when you were not able to pay the mortgage or rent on time? N   In the last 12 months, how many places have you lived? 1   In the last 12 months, was there a time when you did not have a steady place to sleep or slept in a shelter (including now)? N   Transportation Needs   In the past 12 months, has lack of transportation kept you from medical appointments or from getting medications? no   In the past 12 months, has lack of transportation kept you from meetings, work, or from getting things needed for daily living? No   Food Insecurity   Within the past 12 months, you worried that your food would run out before you got the money to buy more. Never true   Within the past 12 months, the food you bought just didn't last and you didn't have money to get more. Never true   Stress   Do you feel stress - tense, restless, nervous, or anxious, or unable to sleep at night because your mind is troubled all the time - these days? Not at all   Social Connections   In a typical week, how many times do you talk on the phone with family, friends, or neighbors? More than 3   How often do you get together with friends or relatives? More than 3   How often do you attend Anabaptist or Druze services? More than 4   Do you belong to any clubs or organizations such as Anabaptist groups, unions, fraternal or athletic groups, or school groups? No   How often do you attend meetings of the clubs or organizations you belong to? Never   Are you , , ,  , never , or living with a partner?    Alcohol Use   Q1: How often do you have a drink containing alcohol? Never   Q2: How many drinks containing alcohol do you have on a typical day when you are drinking? None   Q3: How often do you have six or more drinks on one occasion? Never   OTHER   Name(s) of People in Home Ryan Parekh     [facility] ([address])  Initial Discharge Assessment       Primary Care Provider: Savage Patterson MD    Admission Diagnosis: Obstructive uropathy [N13.9]  Fever [R50.9]    Admission Date: 5/30/2023  Expected Discharge Date:     Transition of Care Barriers: (P) None    Payor: BLUE CROSS BLUE SHIELD / Plan: [facility] FEDERAL BASIC / Product Type: PPO /     Extended Emergency Contact Information  Primary Emergency Contact: Caprice Felix  Mobile Phone: [phone]  Relation: Sister  Preferred language: English   needed? No  Secondary Emergency Contact: ANASTASIIA RYAN  Mobile Phone: [phone]  Relation: Spouse  Preferred language: English   needed? No  Mother: Shasta Sexton   [place]  Mobile Phone: [phone]    Discharge Plan A: (P) Home  Discharge Plan B: (P) Home      [id number] - [place]  [address] AT [facility] [address]  [address] [address]  Phone: [phone] Fax: [phone]      Initial Assessment (most recent)       Adult Discharge Assessment - 05/31/23 0819          Discharge Assessment    Assessment Type Discharge Planning Assessment (P)      Confirmed/corrected address, phone number and insurance Yes (P)      Confirmed Demographics Correct on Facesheet (P)      Source of Information patient (P)      When was your last doctors appointment? -- (P)    not recently    Communicated SUPA with patient/caregiver Date not available/Unable to determine (P)      Reason For Admission Pyelonephritis (P)      People in Home spouse (P)      Facility  Arrived From: home (P)      Do you expect to return to your current living situation? Yes (P)      Do you have help at home or someone to help you manage your care at home? Yes (P)      Who are your caregiver(s) and their phone number(s)? Ryan Parekh, spouse, [phone] (P)      Prior to hospitilization cognitive status: Alert/Oriented;No Deficits (P)      Current cognitive status: Alert/Oriented;No Deficits (P)      Home Layout Able to live on 1st floor (P)      Equipment Currently Used at Home none (P)      Readmission within 30 days? No (P)      Patient currently being followed by outpatient case management? No (P)      Do you currently have service(s) that help you manage your care at home? No (P)      Do you take prescription medications? No (P)      Do you have any problems affording any of your prescribed medications? No (P)      Who is going to help you get home at discharge? spouse, Ryan Parekh (P)      How do you get to doctors appointments? family or friend will provide;car, drives self (P)      Are you on dialysis? No (P)      Do you take coumadin? No (P)      Discharge Plan A Home (P)      Discharge Plan B Home (P)      DME Needed Upon Discharge  none (P)      Discharge Plan discussed with: Patient;Spouse/sig other (P)      Name(s) and Number(s) Ryan Parekh (P)      Transition of Care Barriers None (P)         Physical Activity    On average, how many days per week do you engage in moderate to strenuous exercise (like a brisk walk)? 3 days (P)      On average, how many minutes do you engage in exercise at this level? 30 min (P)         Financial Resource Strain    How hard is it for you to pay for the very basics like food, housing, medical care, and heating? Not hard at all (P)         Housing Stability    In the last 12 months, was there a time when you were not able to pay the mortgage or rent on time? No (P)      In the last 12 months, how many places have you lived? 1 (P)      In the last 12  months, was there a time when you did not have a steady place to sleep or slept in a shelter (including now)? No (P)         Transportation Needs    In the past 12 months, has lack of transportation kept you from medical appointments or from getting medications? No (P)      In the past 12 months, has lack of transportation kept you from meetings, work, or from getting things needed for daily living? No (P)         Food Insecurity    Within the past 12 months, you worried that your food would run out before you got the money to buy more. Never true (P)      Within the past 12 months, the food you bought just didn't last and you didn't have money to get more. Never true (P)         Stress    Do you feel stress - tense, restless, nervous, or anxious, or unable to sleep at night because your mind is troubled all the time - these days? Not at all (P)         Social Connections    In a typical week, how many times do you talk on the phone with family, friends, or neighbors? More than three times a week (P)      How often do you get together with friends or relatives? More than three times a week (P)      How often do you attend Moravian or Anglican services? More than 4 times per year (P)      Do you belong to any clubs or organizations such as Moravian groups, unions, fraternal or athletic groups, or school groups? No (P)      How often do you attend meetings of the clubs or organizations you belong to? Never (P)      Are you , , , , never , or living with a partner?  (P)         Alcohol Use    Q1: How often do you have a drink containing alcohol? Never (P)      Q2: How many drinks containing alcohol do you have on a typical day when you are drinking? Patient does not drink (P)      Q3: How often do you have six or more drinks on one occasion? Never (P)         OTHER    Name(s) of People in Home Ryan Parekh (P)                                   Case management to follow for plans  and arrangements

## 2023-05-31 NOTE — CONSULTS
[facility] - [facility] Surg ([address])  Urology  Consult Note    Patient Name: Melinda Parekh  MRN: [id number]  Admission Date: 5/30/2023  Hospital Length of Stay: 1   Code Status: Full Code   Attending Provider: Pablo Sheridan MD   Consulting Provider: Lucho Krishnamurthy MD  Primary Care Physician: Savage Patterson MD  Principal Problem:Pyelonephritis    Inpatient consult to Urology  Consult performed by: Lucho Krishnamurthy MD  Consult ordered by: Zain Archuleta MD  Reason for consult: right ureteral stone - passed          Subjective:     HPI:  31 yo female presented to [facility] overnight for right flank pain, frequency, urgency, dysuria, fever, and N/V. During workup in the ED, the patient passed a 5 mm kidney stone which was subsequently sent off for analysis. CT prior shows mild right hydronephrosis with a 5 mm calcification in the dependent portion of the bladder. Several hours following, she experienced a 102.3 fever at midnight. WBC of 15, lactate normal, cr 0.9. UA shows 1 RBC, 12 WBC, moderate bacteria. Urine and blood cultures pending. She is currently receiving rocephin empirically and mIVF.    Nausea, vomiting, flank pain have all resolved. She continues to have some frequency and urgency but it has improved significantly. Not currently feeling fevers/chills.      Past Medical History:   Diagnosis Date    H/O elbow surgery     History of pericarditis     Oral contraceptive use        Past Surgical History:   Procedure Laterality Date    left elbow Left 03/30/2017    torn ligament       Review of patient's allergies indicates:   Allergen Reactions    No known drug allergies        Family History       Problem Relation (Age of Onset)    Breast cancer Other    Heart attack Maternal Grandfather    No Known Problems Maternal Grandmother, Sister, Paternal Grandmother, Paternal Grandfather    Skin cancer Other            Tobacco Use    Smoking status: Never    Smokeless tobacco: Never   Substance and Sexual Activity     Alcohol use: Yes     Comment: occasionally     Drug use: No    Sexual activity: Yes     Partners: Male       Review of Systems   Constitutional:  Positive for fever. Negative for appetite change, chills, fatigue and unexpected weight change.   HENT: Negative.     Eyes: Negative.    Respiratory:  Negative for cough, chest tightness and shortness of breath.    Cardiovascular:  Negative for chest pain, palpitations and leg swelling.   Gastrointestinal:  Negative for abdominal pain, constipation, diarrhea, nausea and vomiting.   Genitourinary:  Positive for dysuria, frequency and urgency. Negative for difficulty urinating, flank pain and hematuria.   Musculoskeletal:  Negative for back pain.   Skin:  Negative for rash.   Neurological:  Negative for dizziness, syncope, numbness and headaches.   Hematological:  Does not bruise/bleed easily.   Psychiatric/Behavioral: Negative.       Objective:     Temp:  [98.7 °F (37.1 °C)-102.3 °F (39.1 °C)] 99 °F (37.2 °C)  Pulse:  [] 105  Resp:  [16-20] 16  SpO2:  [96 %-100 %] 99 %  BP: (101-146)/(51-63) 104/51  Weight: 56.7 kg (125 lb)  Body mass index is 22.14 kg/m².           Drains       None                    Physical Exam  Constitutional:       General: She is not in acute distress.     Appearance: She is well-developed.   Eyes:      General: No scleral icterus.  Cardiovascular:      Rate and Rhythm: Normal rate.   Pulmonary:      Effort: Pulmonary effort is normal. No respiratory distress.   Abdominal:      General: Bowel sounds are normal. There is no distension.      Palpations: Abdomen is soft.      Comments: No CVA tenderness.   Genitourinary:     Comments: No CVAT  Musculoskeletal:         General: Normal range of motion.   Skin:     General: Skin is warm and dry.      Findings: No rash.   Neurological:      Mental Status: She is alert and oriented to person, place, and time.   Psychiatric:         Behavior: Behavior normal.        Significant  Labs:    BMP:  Recent Labs   Lab 05/29/23  1319 05/30/23  1747 05/31/23  0429    136 138   K 3.5 3.4* 3.4*    104 108   CO2 20* 22* 24   BUN 9 9 9   CREATININE 0.8 0.9 0.9   CALCIUM 9.8 9.3 8.4*       CBC:  Recent Labs   Lab 05/29/23  1313 05/30/23  1747 05/31/23  0429   WBC 8.65 15.32* 11.47   HGB 14.0 13.6 10.6*   HCT 40.6 39.8 31.2*    165 121*       Blood Culture:   Recent Labs   Lab 05/30/23  1747 05/30/23  1843   LABBLOO Gram stain ernestina bottle: Gram negative rods  Results called to and read back by: Liseth Davis RN 05/31/2023  09:46 Gram stain ernestina bottle: Gram negative rods  Results called to and read back by: Liseth Davis RN 05/31/2023  09:46     Urine Culture: No results for input(s): LABURIN in the last 168 hours.  Urine Studies:   Recent Labs   Lab 05/29/23  1250 05/30/23  1724   COLORU Yellow Colorless*   APPEARANCEUA Hazy* Clear   PHUR 8.0 7.0   SPECGRAV 1.015 <1.005*   PROTEINUA Negative Negative   GLUCUA Negative Negative   KETONESU Negative Trace*   BILIRUBINUA Negative Negative   OCCULTUA Negative 2+*   NITRITE Negative Negative   UROBILINOGEN Negative Negative   LEUKOCYTESUR Negative 3+*   RBCUA 1 1   WBCUA 2 12*   BACTERIA Occasional Moderate*   SQUAMEPITHEL  --  5       Significant Imaging:  CT: I have reviewed all results within the past 24 hours and my personal findings are:  see HPI                      Assessment and Plan:     * Pyelonephritis  - CT reviewed: no additional ureteral stones or signs of obstruction. Stone seen in dependent portion of bladder  - Stone sent for analysis  - No urologic intervention given passage of stone  - Agree with empiric treatment of pyelonephritis with IV abx  - f/u urine and blood cultures  - Remainder of care per primary team        VTE Risk Mitigation (From admission, onward)         Ordered     enoxaparin injection 40 mg  Daily         05/30/23 1851     IP VTE LOW RISK PATIENT  Once         05/30/23 1851     Place sequential  compression device  Until discontinued         05/30/23 2770                Thank you for your consult. I will follow-up with patient. Please contact us if you have any additional questions.    Lucho Krishnamurthy MD  Urology  [facility] - [facility] S[facility] ([address])

## 2023-06-01 LAB
ANION GAP SERPL CALC-SCNC: 6 MMOL/L (ref 8–16)
BACTERIA UR CULT: ABNORMAL
BASOPHILS # BLD AUTO: 0.01 K/UL (ref 0–0.2)
BASOPHILS NFR BLD: 0.2 % (ref 0–1.9)
BUN SERPL-MCNC: 7 MG/DL (ref 6–20)
CALCIUM SERPL-MCNC: 8.6 MG/DL (ref 8.7–10.5)
CHLORIDE SERPL-SCNC: 107 MMOL/L (ref 95–110)
CO2 SERPL-SCNC: 22 MMOL/L (ref 23–29)
CREAT SERPL-MCNC: 0.8 MG/DL (ref 0.5–1.4)
DIFFERENTIAL METHOD: ABNORMAL
EOSINOPHIL # BLD AUTO: 0 K/UL (ref 0–0.5)
EOSINOPHIL NFR BLD: 0.5 % (ref 0–8)
ERYTHROCYTE [DISTWIDTH] IN BLOOD BY AUTOMATED COUNT: 12.5 % (ref 11.5–14.5)
EST. GFR  (NO RACE VARIABLE): >60 ML/MIN/1.73 M^2
GLUCOSE SERPL-MCNC: 99 MG/DL (ref 70–110)
HCT VFR BLD AUTO: 32.2 % (ref 37–48.5)
HGB BLD-MCNC: 11 G/DL (ref 12–16)
IMM GRANULOCYTES # BLD AUTO: 0.02 K/UL (ref 0–0.04)
IMM GRANULOCYTES NFR BLD AUTO: 0.3 % (ref 0–0.5)
LACTATE SERPL-SCNC: 0.9 MMOL/L (ref 0.5–2.2)
LYMPHOCYTES # BLD AUTO: 0.4 K/UL (ref 1–4.8)
LYMPHOCYTES NFR BLD: 6.6 % (ref 18–48)
MAGNESIUM SERPL-MCNC: 1.6 MG/DL (ref 1.6–2.6)
MCH RBC QN AUTO: 30.2 PG (ref 27–31)
MCHC RBC AUTO-ENTMCNC: 34.2 G/DL (ref 32–36)
MCV RBC AUTO: 89 FL (ref 82–98)
MONOCYTES # BLD AUTO: 0.5 K/UL (ref 0.3–1)
MONOCYTES NFR BLD: 7.2 % (ref 4–15)
NEUTROPHILS # BLD AUTO: 5.5 K/UL (ref 1.8–7.7)
NEUTROPHILS NFR BLD: 85.2 % (ref 38–73)
NRBC BLD-RTO: 0 /100 WBC
PHOSPHATE SERPL-MCNC: 1.2 MG/DL (ref 2.7–4.5)
PLATELET # BLD AUTO: 114 K/UL (ref 150–450)
PMV BLD AUTO: 11.2 FL (ref 9.2–12.9)
POTASSIUM SERPL-SCNC: 3.6 MMOL/L (ref 3.5–5.1)
RBC # BLD AUTO: 3.64 M/UL (ref 4–5.4)
SODIUM SERPL-SCNC: 135 MMOL/L (ref 136–145)
WBC # BLD AUTO: 6.4 K/UL (ref 3.9–12.7)

## 2023-06-01 PROCEDURE — 80048 BASIC METABOLIC PNL TOTAL CA: CPT | Performed by: HOSPITALIST

## 2023-06-01 PROCEDURE — 63600175 PHARM REV CODE 636 W HCPCS: Performed by: NURSE PRACTITIONER

## 2023-06-01 PROCEDURE — 85025 COMPLETE CBC W/AUTO DIFF WBC: CPT | Performed by: HOSPITALIST

## 2023-06-01 PROCEDURE — 83735 ASSAY OF MAGNESIUM: CPT | Performed by: HOSPITALIST

## 2023-06-01 PROCEDURE — 84100 ASSAY OF PHOSPHORUS: CPT | Performed by: HOSPITALIST

## 2023-06-01 PROCEDURE — 99233 PR SUBSEQUENT HOSPITAL CARE,LEVL III: ICD-10-PCS | Mod: ,,, | Performed by: HOSPITALIST

## 2023-06-01 PROCEDURE — 36415 COLL VENOUS BLD VENIPUNCTURE: CPT | Performed by: HOSPITALIST

## 2023-06-01 PROCEDURE — 11000001 HC ACUTE MED/SURG PRIVATE ROOM

## 2023-06-01 PROCEDURE — 25000003 PHARM REV CODE 250: Performed by: HOSPITALIST

## 2023-06-01 PROCEDURE — 63600175 PHARM REV CODE 636 W HCPCS: Performed by: HOSPITALIST

## 2023-06-01 PROCEDURE — 94761 N-INVAS EAR/PLS OXIMETRY MLT: CPT

## 2023-06-01 PROCEDURE — 83605 ASSAY OF LACTIC ACID: CPT | Performed by: HOSPITALIST

## 2023-06-01 PROCEDURE — 99233 SBSQ HOSP IP/OBS HIGH 50: CPT | Mod: ,,, | Performed by: HOSPITALIST

## 2023-06-01 PROCEDURE — 87040 BLOOD CULTURE FOR BACTERIA: CPT | Performed by: HOSPITALIST

## 2023-06-01 RX ORDER — SODIUM CHLORIDE, SODIUM LACTATE, POTASSIUM CHLORIDE, CALCIUM CHLORIDE 600; 310; 30; 20 MG/100ML; MG/100ML; MG/100ML; MG/100ML
INJECTION, SOLUTION INTRAVENOUS CONTINUOUS
Status: DISCONTINUED | OUTPATIENT
Start: 2023-06-01 | End: 2023-06-02 | Stop reason: HOSPADM

## 2023-06-01 RX ORDER — IBUPROFEN 400 MG/1
400 TABLET ORAL EVERY 6 HOURS PRN
Status: DISCONTINUED | OUTPATIENT
Start: 2023-06-01 | End: 2023-06-02 | Stop reason: HOSPADM

## 2023-06-01 RX ORDER — L. ACIDOPHILUS/L.BULGARICUS 100MM CELL
1 GRANULES IN PACKET (EA) ORAL 2 TIMES DAILY
Status: DISCONTINUED | OUTPATIENT
Start: 2023-06-01 | End: 2023-06-02 | Stop reason: HOSPADM

## 2023-06-01 RX ADMIN — SODIUM CHLORIDE, SODIUM LACTATE, POTASSIUM CHLORIDE, AND CALCIUM CHLORIDE: 600; 310; 30; 20 INJECTION, SOLUTION INTRAVENOUS at 07:06

## 2023-06-01 RX ADMIN — ENOXAPARIN SODIUM 40 MG: 40 INJECTION SUBCUTANEOUS at 05:06

## 2023-06-01 RX ADMIN — CEFTRIAXONE SODIUM 2 G: 2 INJECTION, POWDER, FOR SOLUTION INTRAMUSCULAR; INTRAVENOUS at 05:06

## 2023-06-01 RX ADMIN — SODIUM CHLORIDE, SODIUM LACTATE, POTASSIUM CHLORIDE, AND CALCIUM CHLORIDE: 600; 310; 30; 20 INJECTION, SOLUTION INTRAVENOUS at 05:06

## 2023-06-01 RX ADMIN — IBUPROFEN 400 MG: 400 TABLET ORAL at 05:06

## 2023-06-01 RX ADMIN — IBUPROFEN 400 MG: 400 TABLET ORAL at 06:06

## 2023-06-01 RX ADMIN — ONDANSETRON 4 MG: 2 INJECTION INTRAMUSCULAR; INTRAVENOUS at 09:06

## 2023-06-01 RX ADMIN — POTASSIUM PHOSPHATE, MONOBASIC AND POTASSIUM PHOSPHATE, DIBASIC 15 MMOL: 224; 236 INJECTION, SOLUTION, CONCENTRATE INTRAVENOUS at 10:06

## 2023-06-01 NOTE — ASSESSMENT & PLAN NOTE
- CT reviewed: no additional ureteral stones or signs of obstruction. Stone seen in dependent portion of bladder  - Stone sent for analysis  - No urologic intervention given passage of stone  - Agree with empiric treatment of pyelonephritis with IV abx  - f/u urine and blood cultures  - Remainder of care per primary team  
Hydronephrosis should resolve with alleviation of obstruction with passive for kidney stone.  Encourage oral hydration.  
Hydronephrosis should resolve with alleviation of obstruction with passive for kidney stone.  Encourage oral hydration.  Restart intravenous fluids today.  
Patient febrile, tachycardic. Moderate bacteria and leukocytes on U/A.      Rocephin  IVF  Urine culture pending     
Patient passed the stone on arrival to M/S room.  Pain relieved.    Urinary stone analysis  IVF  Consult Urology    
Patient with sepsis secondary to urinary tract infection with right pyelonephritis with obstructing right kidney stone with Gram-negative rods bacteremia.  Patient passed right kidney stone on her own.  No urological intervention recommended.  Overall clinically improving with decreasing white blood cell count and otherwise hemodynamically stable.  Fevers however persists.  Increase dose of ceftriaxone to 2 g every 24 hours on 5/31/2023.  Repeat blood cultures today.  Follow up on sensitivities to see if patient can potentially be transitioned to oral fluoroquinolones (QTc interval acceptable).  
Patient with sepsis secondary to urinary tract infection with right pyelonephritis with obstructing right kidney stone with Gram-negative rods bacteremia.  Patient passed right kidney stone on her own.  No urological intervention recommended.  Overall clinically improving with decreasing white blood cell count and otherwise hemodynamically stable.  Fevers however persists.  Increase dose of ceftriaxone to 2 g every 24 hours.  Repeat blood cultures tomorrow morning.  Follow up on sensitivities to see if patient can potentially be transitioned to oral fluoroquinolones (QTc interval acceptable).  
5

## 2023-06-01 NOTE — SUBJECTIVE & OBJECTIVE
Interval History: Further fever overnight.    Review of Systems   Constitutional:  Positive for fever. Negative for chills.   Respiratory:  Negative for shortness of breath.    Cardiovascular:  Negative for chest pain.   Gastrointestinal:  Negative for abdominal pain, nausea and vomiting.   Genitourinary:  Positive for flank pain. Negative for dysuria and frequency.     Objective:     Vital Signs (Most Recent):  Temp: (!) 101.4 °F (38.6 °C) (06/01/23 1732)  Pulse: 85 (06/01/23 1526)  Resp: 16 (06/01/23 1526)  BP: 128/76 (06/01/23 1526)  SpO2: 100 % (06/01/23 1526) Vital Signs (24h Range):  Temp:  [98.2 °F (36.8 °C)-103 °F (39.4 °C)] 101.4 °F (38.6 °C)  Pulse:  [] 85  Resp:  [16-18] 16  SpO2:  [97 %-100 %] 100 %  BP: (101-137)/(55-76) 128/76     Weight: 56.7 kg (125 lb)  Body mass index is 22.14 kg/m².    Intake/Output Summary (Last 24 hours) at 6/1/2023 1829  Last data filed at 6/1/2023 1733  Gross per 24 hour   Intake 2908.56 ml   Output --   Net 2908.56 ml           Physical Exam  Constitutional:       General: She is not in acute distress.  HENT:      Head: Atraumatic.   Eyes:      Conjunctiva/sclera: Conjunctivae normal.   Cardiovascular:      Rate and Rhythm: Regular rhythm. Tachycardia present.      Heart sounds: Normal heart sounds. No murmur heard.  Pulmonary:      Effort: Pulmonary effort is normal.      Breath sounds: Normal breath sounds. No wheezing.   Abdominal:      General: Bowel sounds are normal. There is no distension.      Palpations: Abdomen is soft.      Tenderness: There is abdominal tenderness in the right lower quadrant. There is right CVA tenderness.   Musculoskeletal:         General: No deformity. Normal range of motion.      Cervical back: Neck supple.   Neurological:      Mental Status: She is alert and oriented to person, place, and time.           Significant Labs: All pertinent labs within the past 24 hours have been reviewed.    Significant Imaging: I have reviewed all  pertinent imaging results/findings within the past 24 hours.

## 2023-06-01 NOTE — PROGRESS NOTES
VSS-- ran a temp this afternoon-- Ibuprofen given. IVF inf R AC. Eveila diet. Refused probiotic granules. BM X2 today. Voiding very well. Up ad cornelio. Potassium phos X1 given. Pleasant.

## 2023-06-01 NOTE — PROGRESS NOTES
"[facility] - [address] Medicine  Progress Note    Patient Name: Melinda Parekh  MRN: [id number]  Patient Class: IP- Inpatient   Admission Date: 5/30/2023  Length of Stay: 2 days  Attending Physician: Pablo Sheridan MD  Primary Care Provider: Savage Patterson MD        Subjective:     Principal Problem:Pyelonephritis        HPI:  Per Juaquin Esparza, NP:    "The patient is a 32 year old female with no significant past medical history presents with fevers since last night associated with right flank pain, lower abdominal pain.  She notes sensation of incomplete void, urinary frequency.  She denies dysuria.  She states that her fevers as been as high as 104F.  She was seen here yesterday for flank and abdominal pain.  She was diagnosed with "5 mm obstructing calculus at the right ureteral vesicular junction with mild-moderate right hydroureteronephrosis." She was discharged with tamsulosin, Norco, ketorolac.  She reports that pain has not improved with these.  She started vomiting overnight.  The patient will be admitted for further management of her acute pyelonephritis and Urology consult."      Overview/Hospital Course:  Patient is a 32-year-old woman presents with evidence of sepsis secondary to pyelonephritis.    CT scan shows with right perinephric and periuretreral stranding along with mild right hydronephrosis which appears improved compared to prior study.  There is a renal stone that is in the bladder.    Urinalysis reveals moderate bacteria with 3+ white blood cells.  Initial white blood cell count yesterday elevated at 15 K/uL with neutrophil predominance.  Patient with fever.    Blood and urine cultures checked.  Patient started on intravenous antibiotics with ceftriaxone.  Urology consulted.  Patient passed obstructing kidney stone on her own.  No urological intervention and recommended.    Patient continues to spike fevers and blood cultures positive for Gram-negative rods.  Repeat " blood cultures drawn today on 6/1/2023.      Interval History: Further fever overnight.    Review of Systems   Constitutional:  Positive for fever. Negative for chills.   Respiratory:  Negative for shortness of breath.    Cardiovascular:  Negative for chest pain.   Gastrointestinal:  Negative for abdominal pain, nausea and vomiting.   Genitourinary:  Positive for flank pain. Negative for dysuria and frequency.     Objective:     Vital Signs (Most Recent):  Temp: (!) 101.4 °F (38.6 °C) (06/01/23 1732)  Pulse: 85 (06/01/23 1526)  Resp: 16 (06/01/23 1526)  BP: 128/76 (06/01/23 1526)  SpO2: 100 % (06/01/23 1526) Vital Signs (24h Range):  Temp:  [98.2 °F (36.8 °C)-103 °F (39.4 °C)] 101.4 °F (38.6 °C)  Pulse:  [] 85  Resp:  [16-18] 16  SpO2:  [97 %-100 %] 100 %  BP: (101-137)/(55-76) 128/76     Weight: 56.7 kg (125 lb)  Body mass index is 22.14 kg/m².    Intake/Output Summary (Last 24 hours) at 6/1/2023 1829  Last data filed at 6/1/2023 1733  Gross per 24 hour   Intake 2908.56 ml   Output --   Net 2908.56 ml           Physical Exam  Constitutional:       General: She is not in acute distress.  HENT:      Head: Atraumatic.   Eyes:      Conjunctiva/sclera: Conjunctivae normal.   Cardiovascular:      Rate and Rhythm: Regular rhythm. Tachycardia present.      Heart sounds: Normal heart sounds. No murmur heard.  Pulmonary:      Effort: Pulmonary effort is normal.      Breath sounds: Normal breath sounds. No wheezing.   Abdominal:      General: Bowel sounds are normal. There is no distension.      Palpations: Abdomen is soft.      Tenderness: There is abdominal tenderness in the right lower quadrant. There is right CVA tenderness.   Musculoskeletal:         General: No deformity. Normal range of motion.      Cervical back: Neck supple.   Neurological:      Mental Status: She is alert and oriented to person, place, and time.           Significant Labs: All pertinent labs within the past 24 hours have been  reviewed.    Significant Imaging: I have reviewed all pertinent imaging results/findings within the past 24 hours.      Assessment/Plan:      * Pyelonephritis  Patient with sepsis secondary to urinary tract infection with right pyelonephritis with obstructing right kidney stone with Gram-negative rods bacteremia.  Patient passed right kidney stone on her own.  No urological intervention recommended.  Overall clinically improving with decreasing white blood cell count and otherwise hemodynamically stable.  Fevers however persists.  Increase dose of ceftriaxone to 2 g every 24 hours on 5/31/2023.  Repeat blood cultures today.  Follow up on sensitivities to see if patient can potentially be transitioned to oral fluoroquinolones (QTc interval acceptable).    Hydronephrosis with urinary obstruction due to ureteral calculus  Hydronephrosis should resolve with alleviation of obstruction with passive for kidney stone.  Encourage oral hydration.  Restart intravenous fluids today.      VTE Risk Mitigation (From admission, onward)         Ordered     enoxaparin injection 40 mg  Daily         05/30/23 1851     IP VTE LOW RISK PATIENT  Once         05/30/23 1851     Place sequential compression device  Until discontinued         05/30/23 1851                Pablo Sheridan MD  Department of Hospital Medicine   [facility] - Med Surg ([address])

## 2023-06-02 VITALS
HEART RATE: 72 BPM | WEIGHT: 125 LBS | TEMPERATURE: 99 F | BODY MASS INDEX: 22.15 KG/M2 | RESPIRATION RATE: 18 BRPM | DIASTOLIC BLOOD PRESSURE: 84 MMHG | OXYGEN SATURATION: 100 % | HEIGHT: 63 IN | SYSTOLIC BLOOD PRESSURE: 145 MMHG

## 2023-06-02 LAB
ANION GAP SERPL CALC-SCNC: 5 MMOL/L (ref 8–16)
BACTERIA BLD CULT: ABNORMAL
BASOPHILS # BLD AUTO: 0.01 K/UL (ref 0–0.2)
BASOPHILS NFR BLD: 0.3 % (ref 0–1.9)
BUN SERPL-MCNC: 5 MG/DL (ref 6–20)
CALCIUM SERPL-MCNC: 8.5 MG/DL (ref 8.7–10.5)
CHLORIDE SERPL-SCNC: 108 MMOL/L (ref 95–110)
CO2 SERPL-SCNC: 25 MMOL/L (ref 23–29)
CREAT SERPL-MCNC: 0.8 MG/DL (ref 0.5–1.4)
DIFFERENTIAL METHOD: ABNORMAL
EOSINOPHIL # BLD AUTO: 0 K/UL (ref 0–0.5)
EOSINOPHIL NFR BLD: 0.5 % (ref 0–8)
ERYTHROCYTE [DISTWIDTH] IN BLOOD BY AUTOMATED COUNT: 12.7 % (ref 11.5–14.5)
EST. GFR  (NO RACE VARIABLE): >60 ML/MIN/1.73 M^2
GLUCOSE SERPL-MCNC: 102 MG/DL (ref 70–110)
HCT VFR BLD AUTO: 29.6 % (ref 37–48.5)
HGB BLD-MCNC: 10.2 G/DL (ref 12–16)
IMM GRANULOCYTES # BLD AUTO: 0.02 K/UL (ref 0–0.04)
IMM GRANULOCYTES NFR BLD AUTO: 0.5 % (ref 0–0.5)
LYMPHOCYTES # BLD AUTO: 0.6 K/UL (ref 1–4.8)
LYMPHOCYTES NFR BLD: 16 % (ref 18–48)
MAGNESIUM SERPL-MCNC: 1.4 MG/DL (ref 1.6–2.6)
MCH RBC QN AUTO: 30.2 PG (ref 27–31)
MCHC RBC AUTO-ENTMCNC: 34.5 G/DL (ref 32–36)
MCV RBC AUTO: 88 FL (ref 82–98)
MONOCYTES # BLD AUTO: 0.6 K/UL (ref 0.3–1)
MONOCYTES NFR BLD: 16 % (ref 4–15)
NEUTROPHILS # BLD AUTO: 2.7 K/UL (ref 1.8–7.7)
NEUTROPHILS NFR BLD: 66.7 % (ref 38–73)
NRBC BLD-RTO: 0 /100 WBC
PHOSPHATE SERPL-MCNC: 2.7 MG/DL (ref 2.7–4.5)
PLATELET # BLD AUTO: 139 K/UL (ref 150–450)
PLATELET BLD QL SMEAR: ABNORMAL
PMV BLD AUTO: 11 FL (ref 9.2–12.9)
POTASSIUM SERPL-SCNC: 3.6 MMOL/L (ref 3.5–5.1)
RBC # BLD AUTO: 3.38 M/UL (ref 4–5.4)
SODIUM SERPL-SCNC: 138 MMOL/L (ref 136–145)
WBC # BLD AUTO: 3.99 K/UL (ref 3.9–12.7)

## 2023-06-02 PROCEDURE — 25000003 PHARM REV CODE 250: Performed by: HOSPITALIST

## 2023-06-02 PROCEDURE — 94761 N-INVAS EAR/PLS OXIMETRY MLT: CPT

## 2023-06-02 PROCEDURE — 83735 ASSAY OF MAGNESIUM: CPT | Performed by: HOSPITALIST

## 2023-06-02 PROCEDURE — 36415 COLL VENOUS BLD VENIPUNCTURE: CPT | Performed by: HOSPITALIST

## 2023-06-02 PROCEDURE — 63600175 PHARM REV CODE 636 W HCPCS: Performed by: HOSPITALIST

## 2023-06-02 PROCEDURE — 84100 ASSAY OF PHOSPHORUS: CPT | Performed by: HOSPITALIST

## 2023-06-02 PROCEDURE — 99239 HOSP IP/OBS DSCHRG MGMT >30: CPT | Mod: ,,, | Performed by: HOSPITALIST

## 2023-06-02 PROCEDURE — 99239 PR HOSPITAL DISCHARGE DAY,>30 MIN: ICD-10-PCS | Mod: ,,, | Performed by: HOSPITALIST

## 2023-06-02 PROCEDURE — 85025 COMPLETE CBC W/AUTO DIFF WBC: CPT | Performed by: HOSPITALIST

## 2023-06-02 PROCEDURE — 80048 BASIC METABOLIC PNL TOTAL CA: CPT | Performed by: HOSPITALIST

## 2023-06-02 RX ORDER — L. ACIDOPHILUS/L.BULGARICUS 100MM CELL
1 GRANULES IN PACKET (EA) ORAL 2 TIMES DAILY
Start: 2023-06-02 | End: 2023-11-01

## 2023-06-02 RX ORDER — LEVOFLOXACIN 750 MG/1
750 TABLET ORAL NIGHTLY
Qty: 10 TABLET | Refills: 0 | Status: SHIPPED | OUTPATIENT
Start: 2023-06-02 | End: 2023-06-12

## 2023-06-02 RX ORDER — LANOLIN ALCOHOL/MO/W.PET/CERES
400 CREAM (GRAM) TOPICAL ONCE
Status: COMPLETED | OUTPATIENT
Start: 2023-06-02 | End: 2023-06-02

## 2023-06-02 RX ORDER — ACETAMINOPHEN 500 MG
1000 TABLET ORAL EVERY 8 HOURS PRN
Refills: 0 | COMMUNITY
Start: 2023-06-02 | End: 2023-11-01

## 2023-06-02 RX ORDER — MAGNESIUM SULFATE HEPTAHYDRATE 40 MG/ML
2 INJECTION, SOLUTION INTRAVENOUS ONCE
Status: DISCONTINUED | OUTPATIENT
Start: 2023-06-02 | End: 2023-06-02

## 2023-06-02 RX ORDER — IBUPROFEN 600 MG/1
600 TABLET ORAL EVERY 8 HOURS PRN
Qty: 21 TABLET | Refills: 0 | Status: SHIPPED | OUTPATIENT
Start: 2023-06-02 | End: 2023-06-09

## 2023-06-02 RX ADMIN — CEFTRIAXONE SODIUM 2 G: 2 INJECTION, POWDER, FOR SOLUTION INTRAMUSCULAR; INTRAVENOUS at 05:06

## 2023-06-02 RX ADMIN — Medication 400 MG: at 01:06

## 2023-06-02 NOTE — PLAN OF CARE
Problem: Adult Inpatient Plan of Care  Goal: Plan of Care Review  Outcome: Ongoing, Progressing     Problem: Fatigue  Goal: Improved Activity Tolerance  Outcome: Ongoing, Progressing     Problem: Fever  Goal: Body Temperature in Desired Range  Outcome: Ongoing, Progressing  Intervention: Promote Normothermia  Flowsheets (Taken 6/2/2023 0317)  Fever Reduction/Comfort Measures:   lightweight bedding   lightweight clothing     POC reviewed with patient and spouse who was at bedside at beginning of shift.  A/O x4.  Respirations unlabored.  Skin w/d.  Continent of b/b.  Ambulates to restroom without difficulty.  Pt afebrile thus far in shift.  Pt refused probiotic this shift states she will resume at home upon discharge.  VSS.  See flowsheet for full assessment.  Able to verbalize wants/needs.  No c/o pain or discomfort at this time.  Fall/safety precautions maintained.

## 2023-06-02 NOTE — NURSING
Pt called at 0332 reporting feeling a little shaky/febrile, requested temp be assessed temp=98.3 oral.  During rounding pt temp=102 oral but pt denies feeling febrile/shaky, etc.  States only thing she did was walk back from bathroom.  Temp re-assessed at 0533 temp=98.7 so ibuprofen held per orders/pt request since afebrile at present.  Monitoring continues.

## 2023-06-02 NOTE — PLAN OF CARE
Patient appts have been scheduled and placed on the AVS.  Patient family will help transport at discharge.   06/02/23 1240   Final Note   Assessment Type Final Discharge Note   Anticipated Discharge Disposition Home   Hospital Resources/Appts/Education Provided Appointments scheduled and added to AVS   Post-Acute Status   Discharge Delays None known at this time

## 2023-06-02 NOTE — PLAN OF CARE
Free from falls, injury, or skin breakdown this hospital admission.  Pt eager & in agreement w/ DC. VU of DC instructions--paperwork  passed & explained. Scripts filled and delivered to bedside. IV removed w/ cath tip intact, WNL. Voiding, ambulating, & tolerating PO well.  To be DCd home w/ family--will be escorted downstairs via  transport team once dressed, ready & ride arrives. Pt discharged in no distress.

## 2023-06-02 NOTE — CARE UPDATE
06/02/23 0742   PRE-TX-O2   Device (Oxygen Therapy) room air   SpO2 98 %   Pulse Oximetry Type Intermittent   $ Pulse Oximetry - Multiple Charge Pulse Oximetry - Multiple   Pulse 75   Resp 18   Temp 100.1 °F (37.8 °C)   /76

## 2023-06-03 PROBLEM — N39.0 SEPSIS DUE TO GRAM-NEGATIVE URINARY TRACT INFECTION: Status: ACTIVE | Noted: 2023-06-03

## 2023-06-03 PROBLEM — B96.1 BACTEREMIA DUE TO KLEBSIELLA PNEUMONIAE: Status: ACTIVE | Noted: 2023-06-03

## 2023-06-03 PROBLEM — R78.81 BACTEREMIA DUE TO KLEBSIELLA PNEUMONIAE: Status: ACTIVE | Noted: 2023-06-03

## 2023-06-03 PROBLEM — A41.50 SEPSIS DUE TO GRAM-NEGATIVE URINARY TRACT INFECTION: Status: ACTIVE | Noted: 2023-06-03

## 2023-06-03 NOTE — DISCHARGE SUMMARY
"[facility]urg [address] Medicine  Discharge Summary      Patient Name: Melinda Parekh  MRN: [id number]  BRIGID: [id number]  Patient Class: IP- Inpatient  Admission Date: 5/30/2023  Hospital Length of Stay: 3 days  Discharge Date and Time: 6/2/2023  5:45 PM  Attending Physician: Pablo Sheridan MD  Discharging Provider: Pablo Sheridan MD  Primary Care Provider: Savage Patterson MD      HPI:   Per Juaquin Esparza NP:    "The patient is a 32 year old female with no significant past medical history presents with fevers since last night associated with right flank pain, lower abdominal pain.  She notes sensation of incomplete void, urinary frequency.  She denies dysuria.  She states that her fevers as been as high as 104F.  She was seen here yesterday for flank and abdominal pain.  She was diagnosed with "5 mm obstructing calculus at the right ureteral vesicular junction with mild-moderate right hydroureteronephrosis." She was discharged with tamsulosin, Norco, ketorolac.  She reports that pain has not improved with these.  She started vomiting overnight.  The patient will be admitted for further management of her acute pyelonephritis and Urology consult."      Hospital Course:   Patient is a 32-year-old woman presents with evidence of sepsis secondary to pyelonephritis.  CT scan shows with right perinephric and periuretreral stranding along with mild right hydronephrosis which appears improved compared to prior study.  There is a renal stone that is in the bladder.  Urinalysis reveals moderate bacteria with 3+ white blood cells.  Initial white blood cell count yesterday elevated at 15 K/uL with neutrophil predominance.  Patient with fever.  Blood and urine cultures checked.  Patient volume resuscitated with intravenous fluids.  Patient started on intravenous antibiotics with ceftriaxone.      Urology consulted.  Patient passed obstructing kidney stone on her own.  No urological intervention and " recommended.    Patient clinically improved with intravenous ceftriaxone.  Leukocytosis resolved.  Fever trend improved.  Patient has been hemodynamically stable.  Patient tolerating oral intake well and otherwise feeling well now.  Blood cultures and urine culture positive for Klebsiella pneumoniae.  Repeat blood culture collected on 6/1/2023 with growth today > 48 hours.  Culture shows sensitivity to fluoroquinolones.  QTc not elevated.  Risks and benefits of fluoroquinolone therapy discussed.      Patient advised to avoiding contact sports.  Patient told to discontinue any further ondansetron use and to not to take any other prescription medications or over-the-counter medications without consulting to ensure that it may not prolonged QTC interval while patient remains on fluoroquinolone therapy.  Patient discharged to finish 14 day course of antibiotics total with an additional 10 days of oral levofloxacin.    Patient advised to return to the hospital in the event of persistent fever, nausea, vomiting, or if she otherwise fails to improve day to day.  Close outpatient follow-up advised to ensure resolution of infection and for consideration of possible treatment to reduce risk of recurrent kidney stones.  Patient advised to ensure adequate intake of water and titrate to maintain faint yellow colored or clear urine.       Goals of Care Treatment Preferences:  Code Status: Full Code      Consults:   Consults (From admission, onward)        Status Ordering Provider     Inpatient consult to Urology  Once        Provider:  (Not yet assigned)    Completed JUAN HAYNES          Final Active Diagnoses:    Diagnosis Date Noted POA    PRINCIPAL PROBLEM:  Pyelonephritis [N12] 05/30/2023 Yes    Sepsis due to gram-negative urinary tract infection [A41.50, N39.0] 06/03/2023 Yes    Bacteremia due to Klebsiella pneumoniae [R78.81, B96.1] 06/03/2023 Yes    Hydronephrosis with urinary obstruction due to ureteral calculus  [N13.2] 05/30/2023 Yes      Problems Resolved During this Admission:       Discharged Condition: Stable    Disposition: Home or Self Care    Follow Up:   Follow-up Information     Yoshi Romero MD. Schedule an appointment as soon as possible for a visit in 2 week(s).    Specialty: Urology  Contact information:  [address]  [facility] [address]  [phone]             Savage Patterson MD. Schedule an appointment as soon as possible for a visit in 1 week(s).    Specialty: Family Medicine  Contact information:  [address]  [address]  [facility] [id number] [phone]                       Patient Instructions:      Diet Adult Regular     Notify your health care provider if you experience any of the following:  persistent nausea and vomiting or diarrhea     Notify your health care provider if you experience any of the following:  severe uncontrolled pain     Notify your health care provider if you experience any of the following:  difficulty breathing or increased cough     Notify your health care provider if you experience any of the following:  severe persistent headache     Notify your health care provider if you experience any of the following:  worsening rash     Notify your health care provider if you experience any of the following:  persistent dizziness, light-headedness, or visual disturbances     Notify your health care provider if you experience any of the following:  increased confusion or weakness     Activity as tolerated        Medications:  Reconciled Home Medications:      Medication List      START taking these medications    acetaminophen 500 MG tablet  Commonly known as: TYLENOL  Take 2 tablets (1,000 mg total) by mouth every 8 (eight) hours as needed for Pain. No more than 3,000 mg per 24 hours including acetaminophen in Percocet     ibuprofen 600 MG tablet  Commonly known as: ADVIL,MOTRIN  Take 1 tablet (600 mg total) by mouth every 8 (eight) hours as needed (Fever or Pain).      lactobacillus acidophilus & bulgar 100 million cell packet  Commonly known as: LACTINEX  Take 1 packet (1 each total) by mouth 2 (two) times daily.     levoFLOXacin 750 MG tablet  Commonly known as: LEVAQUIN  Take 1 tablet (750 mg total) by mouth every evening. for 10 days        CONTINUE taking these medications    oxyCODONE-acetaminophen 5-325 mg per tablet  Commonly known as: PERCOCET  Take 1 tablet by mouth every 6 (six) hours as needed for Pain.        STOP taking these medications    ketorolac 10 mg tablet  Commonly known as: TORADOL     ondansetron 4 MG Tbdl  Commonly known as: ZOFRAN-ODT     tamsulosin 0.4 mg Cap  Commonly known as: FLOMAX     tretinoin 0.05 % cream  Commonly known as: RETIN-A            Indwelling Lines/Drains at time of discharge:   Lines/Drains/Airways     None                 Time spent on the discharge of patient: 35 minutes         Pablo Sheridan MD  Department of Hospital Medicine  [facility] ([address])

## 2023-06-05 ENCOUNTER — PATIENT OUTREACH (OUTPATIENT)
Dept: ADMINISTRATIVE | Facility: CLINIC | Age: 33
End: 2023-06-05
Payer: COMMERCIAL

## 2023-06-05 NOTE — PROGRESS NOTES
C3 nurse spoke with Melinda Parekh for a TCC post hospital discharge follow up call. The patient has a scheduled HOSFU appointment with Dori Noel on 06/13/2023 @ 0800.

## 2023-06-06 LAB — BACTERIA BLD CULT: NORMAL

## 2023-06-09 ENCOUNTER — OFFICE VISIT (OUTPATIENT)
Dept: UROLOGY | Facility: CLINIC | Age: 33
End: 2023-06-09
Payer: COMMERCIAL

## 2023-06-09 VITALS
HEART RATE: 87 BPM | WEIGHT: 125.88 LBS | DIASTOLIC BLOOD PRESSURE: 82 MMHG | BODY MASS INDEX: 22.3 KG/M2 | HEIGHT: 63 IN | SYSTOLIC BLOOD PRESSURE: 127 MMHG

## 2023-06-09 DIAGNOSIS — N20.0 KIDNEY STONE ON LEFT SIDE: Primary | ICD-10-CM

## 2023-06-09 PROCEDURE — 3079F PR MOST RECENT DIASTOLIC BLOOD PRESSURE 80-89 MM HG: ICD-10-PCS | Mod: CPTII,S$GLB,, | Performed by: UROLOGY

## 2023-06-09 PROCEDURE — 99214 OFFICE O/P EST MOD 30 MIN: CPT | Mod: S$GLB,,, | Performed by: UROLOGY

## 2023-06-09 PROCEDURE — 3074F SYST BP LT 130 MM HG: CPT | Mod: CPTII,S$GLB,, | Performed by: UROLOGY

## 2023-06-09 PROCEDURE — 3079F DIAST BP 80-89 MM HG: CPT | Mod: CPTII,S$GLB,, | Performed by: UROLOGY

## 2023-06-09 PROCEDURE — 1159F PR MEDICATION LIST DOCUMENTED IN MEDICAL RECORD: ICD-10-PCS | Mod: CPTII,S$GLB,, | Performed by: UROLOGY

## 2023-06-09 PROCEDURE — 99214 PR OFFICE/OUTPT VISIT, EST, LEVL IV, 30-39 MIN: ICD-10-PCS | Mod: S$GLB,,, | Performed by: UROLOGY

## 2023-06-09 PROCEDURE — 3008F PR BODY MASS INDEX (BMI) DOCUMENTED: ICD-10-PCS | Mod: CPTII,S$GLB,, | Performed by: UROLOGY

## 2023-06-09 PROCEDURE — 99999 PR PBB SHADOW E&M-EST. PATIENT-LVL III: ICD-10-PCS | Mod: PBBFAC,,, | Performed by: UROLOGY

## 2023-06-09 PROCEDURE — 3074F PR MOST RECENT SYSTOLIC BLOOD PRESSURE < 130 MM HG: ICD-10-PCS | Mod: CPTII,S$GLB,, | Performed by: UROLOGY

## 2023-06-09 PROCEDURE — 1159F MED LIST DOCD IN RCRD: CPT | Mod: CPTII,S$GLB,, | Performed by: UROLOGY

## 2023-06-09 PROCEDURE — 3008F BODY MASS INDEX DOCD: CPT | Mod: CPTII,S$GLB,, | Performed by: UROLOGY

## 2023-06-09 PROCEDURE — 99999 PR PBB SHADOW E&M-EST. PATIENT-LVL III: CPT | Mod: PBBFAC,,, | Performed by: UROLOGY

## 2023-06-09 NOTE — PROGRESS NOTES
"CC: hospital follow up after infective stone with pyelonephritis    Melinda Parekh is a 32 y.o. woman who is here for the evaluation of pyelonephritis  A new pt referred by her PCP, Savage Patterson MD   Went to the ER on 5/29/23 with  acute right-sided flank pain.  She states pain began approximately 2 hours ago in his sharp and constant.  Radiates to her right lower abdomen.  She reports associated nausea and vomiting.  Denies fever, chills, dysuria, hematuria.  Diagnosed with a kidney stone 1 year ago but never passed stone.  She was diagnosed with "5 mm obstructing calculus at the right ureteral vesicular junction with mild-moderate right hydroureteronephrosis." She was discharged with tamsulosin, Norco, ketorolac.  She reports that pain has not improved with these.  She started vomiting overnight.  The patient was admitted for further management of her acute pyelonephritis and Urology consult."  Hospital Course:   Patient is a 32-year-old woman presents with evidence of sepsis secondary to pyelonephritis.  CT scan shows with right perinephric and periuretreral stranding along with mild right hydronephrosis which appears improved compared to prior study.  There is a renal stone that is in the bladder.  Urinalysis reveals moderate bacteria with 3+ white blood cells.  Initial white blood cell count yesterday elevated at 15 K/uL with neutrophil predominance.  Patient with fever.  Blood and urine cultures checked.  Patient volume resuscitated with intravenous fluids.  Patient started on intravenous antibiotics with ceftriaxone.       Urology consulted.  Patient passed obstructing kidney stone on her own.  No urological intervention and recommended.     Patient clinically improved with intravenous ceftriaxone.  Leukocytosis resolved.  Fever trend improved.  Patient has been hemodynamically stable.  Patient tolerating oral intake well and otherwise feeling well now.  Blood cultures and urine culture positive for " Klebsiella pneumoniae.  Repeat blood culture collected on 6/1/2023 with growth today > 48 hours.  Culture shows sensitivity to fluoroquinolones.    He has seen by a urologist in the past, but her sister recommended her to see me.    Has a family hx of kidney stones.    Past Medical History:   Diagnosis Date    H/O elbow surgery     History of pericarditis     Oral contraceptive use      Past Surgical History:   Procedure Laterality Date    left elbow Left 03/30/2017    torn ligament     Social History     Tobacco Use    Smoking status: Never    Smokeless tobacco: Never   Substance Use Topics    Alcohol use: Yes     Comment: occasionally     Drug use: No     Family History   Problem Relation Age of Onset    No Known Problems Maternal Grandmother     No Known Problems Sister     Heart attack Maternal Grandfather     No Known Problems Paternal Grandmother     No Known Problems Paternal Grandfather     Skin cancer Other     Breast cancer Other     Colon cancer Neg Hx     Ovarian cancer Neg Hx      Allergy:  Review of patient's allergies indicates:   Allergen Reactions    No known drug allergies      Outpatient Encounter Medications as of 6/9/2023   Medication Sig Dispense Refill    levoFLOXacin (LEVAQUIN) 750 MG tablet Take 1 tablet (750 mg total) by mouth every evening. for 10 days 10 tablet 0    acetaminophen (TYLENOL) 500 MG tablet Take 2 tablets (1,000 mg total) by mouth every 8 (eight) hours as needed for Pain. No more than 3,000 mg per 24 hours including acetaminophen in Percocet (Patient not taking: Reported on 6/9/2023)  0    ibuprofen (ADVIL,MOTRIN) 600 MG tablet Take 1 tablet (600 mg total) by mouth every 8 (eight) hours as needed (Fever or Pain). (Patient not taking: Reported on 6/9/2023) 21 tablet 0    lactobacillus acidophilus & bulgar (LACTINEX) 100 million cell packet Take 1 packet (1 each total) by mouth 2 (two) times daily. (Patient not taking: Reported on 6/5/2023)      oxyCODONE-acetaminophen  (PERCOCET) 5-325 mg per tablet Take 1 tablet by mouth every 6 (six) hours as needed for Pain. (Patient not taking: Reported on 6/5/2023) 5 tablet 0     No facility-administered encounter medications on file as of 6/9/2023.     Review of Systems   ROS  Physical Exam     Vitals:    06/09/23 1005   BP: 127/82   Pulse: 87     Physical Exam      LABS:  Lab Results   Component Value Date    CREATININE 0.8 06/02/2023    CREATININE 0.8 06/01/2023    CREATININE 0.9 05/31/2023     Urine Culture, Routine   Date Value Ref Range Status   05/30/2023 KLEBSIELLA PNEUMONIAE  >100,000 cfu/ml   (A)  Final   04/18/2023 KLEBSIELLA PNEUMONIAE  >100,000 cfu/ml   (A)  Final     No results found for: HGBA1C  Radiology:  CT 5/30/23  1. Recently visualized 5 mm right distal ureteral stone has passed and is seen dependently within the right aspect of the urinary bladder.  At most minimal right-sided hydronephrosis is seen which is improved from yesterday's exam.  2. Worsening right perinephric and periureteral stranding/inflammatory change is seen.  This could relate to the recently passed stone, although potential infectious process/ascending urinary tract infection not excluded.    CT 5/29/23  5 mm obstructing calculus at the right ureteral vesicular junction with mild-moderate right hydroureteronephrosis.  Punctate nonobstructing left renal stone is seen.  No left-sided hydronephrosis.    UA clear today. No RBCs or WBCs.    Assessment and Plan:  Diagnoses and all orders for this visit:    Kidney stone on left side  -     US Kidney; Future  -     X-Ray Abdomen AP 1 View; Future    I reviewed all her recent ER and hospital notes and related CT.  I reviewed her CTs with her.  She was able to pass the right ureteral stone and sent it for stone analysis.    Explained the nature of her urologic problems in detail.  Discussed UTI prevention as well as kidney stone prevention.  She has a strong family hx of kidney stone.  She has small,  non-obstructive left kidney stone at present time.  Will follow her with annual radiographic study.  In the mean time, recommend plenty hydration to maintain UO greater than 2 liters a day, high amount of citric acid intake, avoid high amount of oxalate diet ( recommend low oxalate diet).    For UTI, she does not get cystitis or UTI typically.  Recommend daily Probiotics.  She can finish levaquin that was given.  Her urine is clear of infection today.    All questions answered.    Follow-up:  Follow up in about 1 year (around 6/9/2024) for KUB, US.

## 2023-06-12 LAB
COMPN STONE: NORMAL
LABORATORY COMMENT REPORT: NORMAL
SPECIMEN SOURCE: NORMAL
STONE ANALYSIS IR-IMP: NORMAL

## 2023-09-04 PROBLEM — A41.50 SEPSIS DUE TO GRAM-NEGATIVE URINARY TRACT INFECTION: Status: RESOLVED | Noted: 2023-06-03 | Resolved: 2023-09-04

## 2023-09-04 PROBLEM — N39.0 SEPSIS DUE TO GRAM-NEGATIVE URINARY TRACT INFECTION: Status: RESOLVED | Noted: 2023-06-03 | Resolved: 2023-09-04

## 2023-09-04 PROBLEM — N12 PYELONEPHRITIS: Status: RESOLVED | Noted: 2023-05-30 | Resolved: 2023-09-04

## 2023-09-18 ENCOUNTER — PATIENT MESSAGE (OUTPATIENT)
Dept: PRIMARY CARE CLINIC | Facility: CLINIC | Age: 33
End: 2023-09-18
Payer: COMMERCIAL

## 2023-10-18 ENCOUNTER — PATIENT MESSAGE (OUTPATIENT)
Dept: CARDIOLOGY | Facility: CLINIC | Age: 33
End: 2023-10-18
Payer: COMMERCIAL

## 2023-11-01 ENCOUNTER — OFFICE VISIT (OUTPATIENT)
Dept: OBSTETRICS AND GYNECOLOGY | Facility: CLINIC | Age: 33
End: 2023-11-01
Attending: OBSTETRICS & GYNECOLOGY
Payer: COMMERCIAL

## 2023-11-01 VITALS
BODY MASS INDEX: 21.97 KG/M2 | HEIGHT: 63 IN | DIASTOLIC BLOOD PRESSURE: 64 MMHG | WEIGHT: 124 LBS | SYSTOLIC BLOOD PRESSURE: 110 MMHG

## 2023-11-01 DIAGNOSIS — Z01.419 ENCOUNTER FOR GYNECOLOGICAL EXAMINATION (GENERAL) (ROUTINE) WITHOUT ABNORMAL FINDINGS: Primary | ICD-10-CM

## 2023-11-01 PROCEDURE — 1159F PR MEDICATION LIST DOCUMENTED IN MEDICAL RECORD: ICD-10-PCS | Mod: CPTII,S$GLB,, | Performed by: OBSTETRICS & GYNECOLOGY

## 2023-11-01 PROCEDURE — 3078F PR MOST RECENT DIASTOLIC BLOOD PRESSURE < 80 MM HG: ICD-10-PCS | Mod: CPTII,S$GLB,, | Performed by: OBSTETRICS & GYNECOLOGY

## 2023-11-01 PROCEDURE — 99999 PR PBB SHADOW E&M-EST. PATIENT-LVL III: CPT | Mod: PBBFAC,,, | Performed by: OBSTETRICS & GYNECOLOGY

## 2023-11-01 PROCEDURE — 3008F BODY MASS INDEX DOCD: CPT | Mod: CPTII,S$GLB,, | Performed by: OBSTETRICS & GYNECOLOGY

## 2023-11-01 PROCEDURE — 3074F SYST BP LT 130 MM HG: CPT | Mod: CPTII,S$GLB,, | Performed by: OBSTETRICS & GYNECOLOGY

## 2023-11-01 PROCEDURE — 99395 PREV VISIT EST AGE 18-39: CPT | Mod: S$GLB,,, | Performed by: OBSTETRICS & GYNECOLOGY

## 2023-11-01 PROCEDURE — 3008F PR BODY MASS INDEX (BMI) DOCUMENTED: ICD-10-PCS | Mod: CPTII,S$GLB,, | Performed by: OBSTETRICS & GYNECOLOGY

## 2023-11-01 PROCEDURE — 1159F MED LIST DOCD IN RCRD: CPT | Mod: CPTII,S$GLB,, | Performed by: OBSTETRICS & GYNECOLOGY

## 2023-11-01 PROCEDURE — 99395 PR PREVENTIVE VISIT,EST,18-39: ICD-10-PCS | Mod: S$GLB,,, | Performed by: OBSTETRICS & GYNECOLOGY

## 2023-11-01 PROCEDURE — 99999 PR PBB SHADOW E&M-EST. PATIENT-LVL III: ICD-10-PCS | Mod: PBBFAC,,, | Performed by: OBSTETRICS & GYNECOLOGY

## 2023-11-01 PROCEDURE — 3078F DIAST BP <80 MM HG: CPT | Mod: CPTII,S$GLB,, | Performed by: OBSTETRICS & GYNECOLOGY

## 2023-11-01 PROCEDURE — 3074F PR MOST RECENT SYSTOLIC BLOOD PRESSURE < 130 MM HG: ICD-10-PCS | Mod: CPTII,S$GLB,, | Performed by: OBSTETRICS & GYNECOLOGY

## 2023-11-01 RX ORDER — TRETINOIN 0.5 MG/G
CREAM TOPICAL
COMMUNITY
Start: 2023-08-17

## 2023-11-01 NOTE — PROGRESS NOTES
Subjective:       Patient ID: Melinda Parekh is a 33 y.o. female.    Chief Complaint:  Well Woman (Last papa  neg hpv neg )        History of Present Illness  Melinda Parekh is a 33 y.o. female  who presents for annual. Overall doing well. Periods normal.  with vasectomy. Reports she still feels pressue externally when sitting where she had episiotomy with her last baby. Did not feel like she healed right. Feels like it should be more together at the bottom. No dyspareunia, no urinary or fecal incontinence. Discussed in detail. We talked about repair in the past of the external part in the office.     Patient's last menstrual period was 10/18/2023.   Date of Last Pap: No result found    Review of Systems  Review of Systems   Constitutional:  Negative for chills and fever.        Objective:   Physical Exam:   Constitutional: She is oriented to person, place, and time. Vital signs are normal. She appears well-developed and well-nourished. No distress.        Pulmonary/Chest: She exhibits no mass. Right breast exhibits no mass, no nipple discharge, no skin change, no tenderness, no bleeding and no swelling. Left breast exhibits no mass, no nipple discharge, no skin change, no tenderness, no bleeding and no swelling. Breasts are symmetrical.        Abdominal: Soft. Bowel sounds are normal. She exhibits no distension and no mass. There is no abdominal tenderness. There is no rebound.     Genitourinary:    Vagina and uterus normal.         There is no rash, tenderness, lesion or injury on the right labia. There is no rash, tenderness, lesion or injury on the left labia. Cervix is normal. Right adnexum displays no mass, no tenderness and no fullness. Left adnexum displays no mass, no tenderness and no fullness. No erythema,  no vaginal discharge, tenderness, rectocele, cystocele or unspecified prolapse of vaginal walls in the vagina. Cervix exhibits no motion tenderness, no discharge and no friability. Uterus  is not deviated, not enlarged, not fixed, not tender and not hosting fibroids.    Genitourinary Comments: At 6 o'clock at the posterior fourchette there is some superficial separation. Deep tissue intact.              Musculoskeletal: Normal range of motion and moves all extremeties.      Lymphadenopathy:        Right: No supraclavicular adenopathy present.        Left: No supraclavicular adenopathy present.    Neurological: She is alert and oriented to person, place, and time.    Skin: Skin is warm and dry.    Psychiatric: She has a normal mood and affect. Her behavior is normal. Judgment normal.        Assessment/ Plan:     1. Encounter for gynecological examination (general) (routine) without abnormal findings        Explained area, would need small excision and then reapproximate. Can do in the office with local. She will think about it.     No follow-ups on file.    As of April 1, 2021, the Cures Act has been passed nationally. This new law requires that all doctors progress notes, lab results, pathology reports and radiology reports be released IMMEDIATELY to the patient in the patient portal. That means that the results are released to you at the EXACT same time they are released to me. Therefore, with all of the patients that I have I am not able to reply to each patient exactly when the results come in. So there will be a delay from when you see the results to when I see them and have time to come up with a response to send you. Also I only see these results when I am on the computer at work. So if the results come in over the weekend or after 5 pm of a work day, I will not see them until the next business day. As you can tell, this is a challenge as a physician to give every patient the quick response they hope for and deserve. So please be patient! Thanks for understanding, Dr. Smith

## 2024-04-25 ENCOUNTER — PATIENT OUTREACH (OUTPATIENT)
Dept: ADMINISTRATIVE | Facility: HOSPITAL | Age: 34
End: 2024-04-25
Payer: COMMERCIAL

## 2024-04-25 ENCOUNTER — PATIENT MESSAGE (OUTPATIENT)
Dept: ADMINISTRATIVE | Facility: HOSPITAL | Age: 34
End: 2024-04-25
Payer: COMMERCIAL

## 2024-04-25 NOTE — PROGRESS NOTES
Health Maintenance Due   Topic Date Due    Lipid Panel  Never done    Influenza Vaccine (1) 09/01/2023    COVID-19 Vaccine (1 - 2023-24 season) Never done     Immunizations - reviewed and updated   Care Everywhere - triggered   Care Teams - updated   Outreach - [facility] panel list reviewed. Patient due for annual visit with PCP. Patient last seen on 2/22/2022. Portal message sent in regards to scheduling

## 2024-09-19 ENCOUNTER — PATIENT MESSAGE (OUTPATIENT)
Dept: PRIMARY CARE CLINIC | Facility: CLINIC | Age: 34
End: 2024-09-19
Payer: COMMERCIAL

## 2025-01-23 NOTE — ASSESSMENT & PLAN NOTE
- Febrile illness with unclear etiology. Reported 9 day history with generalized symptoms; fever, chills, fatigue, nausea, vomiting, loose stool, headache, and dizziness.  - Differential broad at this time: influenza neg, strep neg. Low suspicion for C diff given frequency does not align and no prior history, CXR unremarkable, UA relatively unremarkable and prior urine culture no growth. No recent travel or significant sick contacts though older child is in . Had completed course of treatment for episiotomy dehiscence 5 weeks prior to presentation for Prevotella spp. With neurologic symptoms, meningitis/encephalitis possible, though bacterial seems unlikely given would expect much more severe without treatment for this duration.   - Discussed with ED physician, Dr. Barnhart; greatly appreciate assistance with obtaining LP. Will check CSF cell count and diff, protein, glucose, HSV as starters and freeze/hold CSF fluid.  - Post-LP likely initiate broad spectrum antibiotic therapy with ceftriaxone 2g IV q12hr, vancomycin IV; if LP labs suggestive of infection, may add ampicillin, acyclovir in addition.  - Will consult ID for further evaluation/management recommendations.   Forms were faxed and confirmed received 01/16/25. Forms were filed with confirmation sheet.